# Patient Record
Sex: FEMALE | Race: WHITE | Employment: OTHER | URBAN - METROPOLITAN AREA
[De-identification: names, ages, dates, MRNs, and addresses within clinical notes are randomized per-mention and may not be internally consistent; named-entity substitution may affect disease eponyms.]

---

## 2017-02-20 ENCOUNTER — OFFICE VISIT (OUTPATIENT)
Dept: HEMATOLOGY/ONCOLOGY | Facility: HOSPITAL | Age: 57
End: 2017-02-20
Attending: INTERNAL MEDICINE
Payer: COMMERCIAL

## 2017-02-20 VITALS
DIASTOLIC BLOOD PRESSURE: 66 MMHG | HEIGHT: 65 IN | BODY MASS INDEX: 33.29 KG/M2 | SYSTOLIC BLOOD PRESSURE: 131 MMHG | RESPIRATION RATE: 20 BRPM | WEIGHT: 199.81 LBS | TEMPERATURE: 99 F | HEART RATE: 60 BPM

## 2017-02-20 DIAGNOSIS — K50.919 CROHN'S DISEASE WITH COMPLICATION, UNSPECIFIED GASTROINTESTINAL TRACT LOCATION (HCC): Primary | ICD-10-CM

## 2017-02-20 PROCEDURE — 96413 CHEMO IV INFUSION 1 HR: CPT

## 2017-02-20 PROCEDURE — 96415 CHEMO IV INFUSION ADDL HR: CPT

## 2017-02-20 RX ORDER — ACETAMINOPHEN 325 MG/1
650 TABLET ORAL ONCE
Status: COMPLETED | OUTPATIENT
Start: 2017-02-20 | End: 2017-02-20

## 2017-02-20 RX ORDER — DIPHENHYDRAMINE HCL 25 MG
25 CAPSULE ORAL ONCE
Status: COMPLETED | OUTPATIENT
Start: 2017-02-20 | End: 2017-02-20

## 2017-02-20 RX ADMIN — DIPHENHYDRAMINE HCL 25 MG: 25 MG CAPSULE ORAL at 13:25:00

## 2017-02-20 RX ADMIN — ACETAMINOPHEN 650 MG: 325 TABLET ORAL at 13:25:00

## 2017-02-20 NOTE — PROGRESS NOTES
Education Record    Learner:  Patient    Disease / Diagnosis:IBS    Barriers / Limitations:  None              Comments:    Method:  Brief focused              Comments:    General Topics:  Medication, Side effects and symptom management and Plan of care r

## 2017-04-17 ENCOUNTER — OFFICE VISIT (OUTPATIENT)
Dept: HEMATOLOGY/ONCOLOGY | Facility: HOSPITAL | Age: 57
End: 2017-04-17
Attending: INTERNAL MEDICINE
Payer: COMMERCIAL

## 2017-04-17 VITALS
HEART RATE: 68 BPM | DIASTOLIC BLOOD PRESSURE: 78 MMHG | BODY MASS INDEX: 32.79 KG/M2 | HEIGHT: 65 IN | RESPIRATION RATE: 18 BRPM | TEMPERATURE: 99 F | WEIGHT: 196.81 LBS | SYSTOLIC BLOOD PRESSURE: 130 MMHG

## 2017-04-17 DIAGNOSIS — K50.919 CROHN'S DISEASE WITH COMPLICATION, UNSPECIFIED GASTROINTESTINAL TRACT LOCATION (HCC): Primary | ICD-10-CM

## 2017-04-17 PROCEDURE — 96413 CHEMO IV INFUSION 1 HR: CPT

## 2017-04-17 PROCEDURE — 96415 CHEMO IV INFUSION ADDL HR: CPT

## 2017-04-17 RX ORDER — DIPHENHYDRAMINE HCL 25 MG
25 CAPSULE ORAL ONCE
Status: COMPLETED | OUTPATIENT
Start: 2017-04-17 | End: 2017-04-17

## 2017-04-17 RX ORDER — ACETAMINOPHEN 325 MG/1
650 TABLET ORAL ONCE
Status: COMPLETED | OUTPATIENT
Start: 2017-04-17 | End: 2017-04-17

## 2017-04-17 RX ADMIN — DIPHENHYDRAMINE HCL 25 MG: 25 MG CAPSULE ORAL at 13:15:00

## 2017-04-17 RX ADMIN — ACETAMINOPHEN 650 MG: 325 TABLET ORAL at 13:15:00

## 2017-04-17 NOTE — PROGRESS NOTES
Education Record    Learner:  Patient    Disease / Diagnosis: crohn's    Barriers / Limitations:  None   Comments:    Method:  Discussion   Comments:    General Topics:  Medication, Side effects and symptom management and Plan of care reviewed   Comments:

## 2017-06-12 ENCOUNTER — OFFICE VISIT (OUTPATIENT)
Dept: HEMATOLOGY/ONCOLOGY | Facility: HOSPITAL | Age: 57
End: 2017-06-12
Attending: INTERNAL MEDICINE
Payer: COMMERCIAL

## 2017-06-12 VITALS
TEMPERATURE: 98 F | RESPIRATION RATE: 18 BRPM | WEIGHT: 196.38 LBS | OXYGEN SATURATION: 97 % | DIASTOLIC BLOOD PRESSURE: 71 MMHG | SYSTOLIC BLOOD PRESSURE: 115 MMHG | HEIGHT: 65 IN | BODY MASS INDEX: 32.72 KG/M2 | HEART RATE: 69 BPM

## 2017-06-12 DIAGNOSIS — K50.919 CROHN'S DISEASE WITH COMPLICATION, UNSPECIFIED GASTROINTESTINAL TRACT LOCATION (HCC): Primary | ICD-10-CM

## 2017-06-12 PROCEDURE — 96415 CHEMO IV INFUSION ADDL HR: CPT

## 2017-06-12 PROCEDURE — 96413 CHEMO IV INFUSION 1 HR: CPT

## 2017-06-12 RX ORDER — DIPHENHYDRAMINE HCL 25 MG
25 CAPSULE ORAL ONCE
Status: COMPLETED | OUTPATIENT
Start: 2017-06-12 | End: 2017-06-12

## 2017-06-12 RX ORDER — ACETAMINOPHEN 325 MG/1
650 TABLET ORAL ONCE
Status: COMPLETED | OUTPATIENT
Start: 2017-06-12 | End: 2017-06-12

## 2017-06-12 RX ADMIN — ACETAMINOPHEN 650 MG: 325 TABLET ORAL at 13:00:00

## 2017-06-12 RX ADMIN — DIPHENHYDRAMINE HCL 25 MG: 25 MG CAPSULE ORAL at 13:00:00

## 2017-08-07 ENCOUNTER — OFFICE VISIT (OUTPATIENT)
Dept: HEMATOLOGY/ONCOLOGY | Facility: HOSPITAL | Age: 57
End: 2017-08-07
Attending: INTERNAL MEDICINE
Payer: COMMERCIAL

## 2017-08-07 VITALS
WEIGHT: 196 LBS | DIASTOLIC BLOOD PRESSURE: 77 MMHG | OXYGEN SATURATION: 97 % | RESPIRATION RATE: 18 BRPM | TEMPERATURE: 98 F | SYSTOLIC BLOOD PRESSURE: 125 MMHG | HEART RATE: 65 BPM | HEIGHT: 65 IN | BODY MASS INDEX: 32.65 KG/M2

## 2017-08-07 DIAGNOSIS — K50.919 CROHN'S DISEASE WITH COMPLICATION, UNSPECIFIED GASTROINTESTINAL TRACT LOCATION (HCC): Primary | ICD-10-CM

## 2017-08-07 PROCEDURE — 96415 CHEMO IV INFUSION ADDL HR: CPT

## 2017-08-07 PROCEDURE — 96413 CHEMO IV INFUSION 1 HR: CPT

## 2017-08-07 RX ORDER — ACETAMINOPHEN 325 MG/1
650 TABLET ORAL ONCE
Status: COMPLETED | OUTPATIENT
Start: 2017-08-07 | End: 2017-08-07

## 2017-08-07 RX ORDER — DIPHENHYDRAMINE HCL 25 MG
25 CAPSULE ORAL ONCE
Status: COMPLETED | OUTPATIENT
Start: 2017-08-07 | End: 2017-08-07

## 2017-08-07 RX ADMIN — ACETAMINOPHEN 650 MG: 325 TABLET ORAL at 13:28:00

## 2017-08-07 RX ADMIN — DIPHENHYDRAMINE HCL 25 MG: 25 MG CAPSULE ORAL at 13:28:00

## 2017-08-07 NOTE — PROGRESS NOTES
Education Record    Learner:  Patient    Disease / Diagnosis: Crohn's/Remicade     Barriers / Limitations:  None   Comments:    Method:  Discussion   Comments:    General Topics:  Medication, Side effects and symptom management and Plan of care reviewed

## 2017-10-02 ENCOUNTER — OFFICE VISIT (OUTPATIENT)
Dept: HEMATOLOGY/ONCOLOGY | Facility: HOSPITAL | Age: 57
End: 2017-10-02
Attending: INTERNAL MEDICINE
Payer: COMMERCIAL

## 2017-10-02 VITALS
BODY MASS INDEX: 32.96 KG/M2 | TEMPERATURE: 97 F | SYSTOLIC BLOOD PRESSURE: 112 MMHG | HEIGHT: 65 IN | DIASTOLIC BLOOD PRESSURE: 63 MMHG | WEIGHT: 197.81 LBS | HEART RATE: 57 BPM | RESPIRATION RATE: 16 BRPM

## 2017-10-02 DIAGNOSIS — K50.919 CROHN'S DISEASE WITH COMPLICATION, UNSPECIFIED GASTROINTESTINAL TRACT LOCATION (HCC): Primary | ICD-10-CM

## 2017-10-02 PROCEDURE — 96415 CHEMO IV INFUSION ADDL HR: CPT

## 2017-10-02 PROCEDURE — 96413 CHEMO IV INFUSION 1 HR: CPT

## 2017-10-02 RX ORDER — DIPHENHYDRAMINE HCL 25 MG
25 CAPSULE ORAL ONCE
Status: COMPLETED | OUTPATIENT
Start: 2017-10-02 | End: 2017-10-02

## 2017-10-02 RX ORDER — ACETAMINOPHEN 325 MG/1
650 TABLET ORAL ONCE
Status: COMPLETED | OUTPATIENT
Start: 2017-10-02 | End: 2017-10-02

## 2017-10-02 RX ADMIN — DIPHENHYDRAMINE HCL 25 MG: 25 MG CAPSULE ORAL at 13:20:00

## 2017-10-02 RX ADMIN — ACETAMINOPHEN 650 MG: 325 TABLET ORAL at 13:20:00

## 2017-10-02 NOTE — PROGRESS NOTES
Education Record    Learner:  Patient    Disease / Diagnosis: Crohn disease    Barriers / Limitations:  None   Comments:    Method:  Brief focused and Reinforcement   Comments:    General Topics:  Plan of care reviewed   Comments:    Outcome:  Shows unders

## 2017-11-06 ENCOUNTER — TELEPHONE (OUTPATIENT)
Dept: HEMATOLOGY/ONCOLOGY | Facility: HOSPITAL | Age: 57
End: 2017-11-06

## 2017-11-06 RX ORDER — ACETAMINOPHEN 325 MG/1
650 TABLET ORAL ONCE
Status: CANCELLED | OUTPATIENT
Start: 2017-11-06

## 2017-11-06 RX ORDER — DIPHENHYDRAMINE HCL 25 MG
25 CAPSULE ORAL ONCE
Status: CANCELLED | OUTPATIENT
Start: 2017-11-06

## 2017-11-06 NOTE — TELEPHONE ENCOUNTER
lvm for a return call to schedule pt for a remicade appt on or ofter 11/27/17.  The order has been scanned into the her chart

## 2017-11-06 NOTE — PROGRESS NOTES
New Remicade order received and entered into EPIC. Lani Favre made aware of authorization #. Okay to scan and schedule.

## 2017-11-27 ENCOUNTER — OFFICE VISIT (OUTPATIENT)
Dept: HEMATOLOGY/ONCOLOGY | Facility: HOSPITAL | Age: 57
End: 2017-11-27
Attending: INTERNAL MEDICINE
Payer: COMMERCIAL

## 2017-11-27 VITALS
HEIGHT: 65 IN | HEART RATE: 58 BPM | RESPIRATION RATE: 16 BRPM | SYSTOLIC BLOOD PRESSURE: 116 MMHG | WEIGHT: 202 LBS | TEMPERATURE: 98 F | DIASTOLIC BLOOD PRESSURE: 73 MMHG | BODY MASS INDEX: 33.66 KG/M2

## 2017-11-27 DIAGNOSIS — K50.919 CROHN'S DISEASE WITH COMPLICATION, UNSPECIFIED GASTROINTESTINAL TRACT LOCATION (HCC): Primary | ICD-10-CM

## 2017-11-27 PROCEDURE — 96415 CHEMO IV INFUSION ADDL HR: CPT

## 2017-11-27 PROCEDURE — 96413 CHEMO IV INFUSION 1 HR: CPT

## 2017-11-27 RX ORDER — ACETAMINOPHEN 325 MG/1
650 TABLET ORAL ONCE
Status: COMPLETED | OUTPATIENT
Start: 2017-11-27 | End: 2017-11-27

## 2017-11-27 RX ORDER — ACETAMINOPHEN 325 MG/1
650 TABLET ORAL ONCE
Status: CANCELLED | OUTPATIENT
Start: 2017-11-27

## 2017-11-27 RX ORDER — DIPHENHYDRAMINE HCL 25 MG
25 CAPSULE ORAL ONCE
Status: CANCELLED | OUTPATIENT
Start: 2017-11-27

## 2017-11-27 RX ORDER — DIPHENHYDRAMINE HCL 25 MG
25 CAPSULE ORAL ONCE
Status: COMPLETED | OUTPATIENT
Start: 2017-11-27 | End: 2017-11-27

## 2017-11-27 RX ADMIN — ACETAMINOPHEN 650 MG: 325 TABLET ORAL at 13:30:00

## 2017-11-27 RX ADMIN — DIPHENHYDRAMINE HCL 25 MG: 25 MG CAPSULE ORAL at 13:30:00

## 2017-11-27 NOTE — PATIENT INSTRUCTIONS
Education Record     Learner:  Patient     Disease / Diagnosis:Crohn's disease     Barriers / Limitations:  None              Comments:     Method:  Brief focused and Reinforcement              Comments:     General Topics:  Medication, Side effects and sy

## 2017-12-04 PROBLEM — K50.80 CROHN'S DISEASE OF BOTH SMALL AND LARGE INTESTINE WITHOUT COMPLICATION (HCC): Status: ACTIVE | Noted: 2017-12-04

## 2018-01-22 ENCOUNTER — OFFICE VISIT (OUTPATIENT)
Dept: HEMATOLOGY/ONCOLOGY | Facility: HOSPITAL | Age: 58
End: 2018-01-22
Attending: INTERNAL MEDICINE
Payer: COMMERCIAL

## 2018-01-22 VITALS
BODY MASS INDEX: 33.76 KG/M2 | OXYGEN SATURATION: 98 % | TEMPERATURE: 99 F | HEIGHT: 65 IN | SYSTOLIC BLOOD PRESSURE: 116 MMHG | WEIGHT: 202.63 LBS | RESPIRATION RATE: 18 BRPM | DIASTOLIC BLOOD PRESSURE: 78 MMHG | HEART RATE: 95 BPM

## 2018-01-22 DIAGNOSIS — K50.919 CROHN'S DISEASE WITH COMPLICATION, UNSPECIFIED GASTROINTESTINAL TRACT LOCATION (HCC): Primary | ICD-10-CM

## 2018-01-22 PROCEDURE — 96413 CHEMO IV INFUSION 1 HR: CPT

## 2018-01-22 PROCEDURE — 96415 CHEMO IV INFUSION ADDL HR: CPT

## 2018-01-22 RX ORDER — DIPHENHYDRAMINE HCL 25 MG
25 CAPSULE ORAL ONCE
Status: COMPLETED | OUTPATIENT
Start: 2018-01-22 | End: 2018-01-22

## 2018-01-22 RX ORDER — DIPHENHYDRAMINE HCL 25 MG
25 CAPSULE ORAL ONCE
Status: CANCELLED | OUTPATIENT
Start: 2018-01-22

## 2018-01-22 RX ORDER — ACETAMINOPHEN 325 MG/1
650 TABLET ORAL ONCE
Status: COMPLETED | OUTPATIENT
Start: 2018-01-22 | End: 2018-01-22

## 2018-01-22 RX ORDER — ACETAMINOPHEN 325 MG/1
650 TABLET ORAL ONCE
Status: CANCELLED | OUTPATIENT
Start: 2018-01-22

## 2018-01-22 RX ADMIN — ACETAMINOPHEN 650 MG: 325 TABLET ORAL at 13:09:00

## 2018-01-22 RX ADMIN — DIPHENHYDRAMINE HCL 25 MG: 25 MG CAPSULE ORAL at 13:09:00

## 2018-01-22 NOTE — PROGRESS NOTES
Education Record    Learner:  Patient    Disease / Diagnosis: Crohn's disease    Barriers / Limitations:  None   Comments:    Method:  Brief focused   Comments:    General Topics:  Plan of care reviewed   Comments:    Outcome:  Shows understanding   Commen

## 2018-03-19 ENCOUNTER — OFFICE VISIT (OUTPATIENT)
Dept: HEMATOLOGY/ONCOLOGY | Facility: HOSPITAL | Age: 58
End: 2018-03-19
Attending: INTERNAL MEDICINE
Payer: COMMERCIAL

## 2018-03-19 VITALS
HEART RATE: 58 BPM | OXYGEN SATURATION: 96 % | DIASTOLIC BLOOD PRESSURE: 78 MMHG | RESPIRATION RATE: 16 BRPM | SYSTOLIC BLOOD PRESSURE: 122 MMHG

## 2018-03-19 DIAGNOSIS — K50.919 CROHN'S DISEASE WITH COMPLICATION, UNSPECIFIED GASTROINTESTINAL TRACT LOCATION (HCC): Primary | ICD-10-CM

## 2018-03-19 PROCEDURE — 96413 CHEMO IV INFUSION 1 HR: CPT

## 2018-03-19 PROCEDURE — 96415 CHEMO IV INFUSION ADDL HR: CPT

## 2018-03-19 RX ORDER — ACETAMINOPHEN 325 MG/1
650 TABLET ORAL ONCE
Status: CANCELLED | OUTPATIENT
Start: 2018-03-19

## 2018-03-19 RX ORDER — DIPHENHYDRAMINE HCL 25 MG
25 CAPSULE ORAL ONCE
Status: COMPLETED | OUTPATIENT
Start: 2018-03-19 | End: 2018-03-19

## 2018-03-19 RX ORDER — DIPHENHYDRAMINE HCL 25 MG
25 CAPSULE ORAL ONCE
Status: CANCELLED | OUTPATIENT
Start: 2018-03-19

## 2018-03-19 RX ORDER — ACETAMINOPHEN 325 MG/1
650 TABLET ORAL ONCE
Status: COMPLETED | OUTPATIENT
Start: 2018-03-19 | End: 2018-03-19

## 2018-03-19 RX ADMIN — DIPHENHYDRAMINE HCL 25 MG: 25 MG CAPSULE ORAL at 13:45:00

## 2018-03-19 RX ADMIN — ACETAMINOPHEN 650 MG: 325 TABLET ORAL at 13:45:00

## 2018-03-19 NOTE — PROGRESS NOTES
Education Record    Learner:  Patient    Disease / Diagnosis:crohns disease    Barriers / Limitations:  None    Method:  Brief focused, printed material and  reinforcement    General Topics:  Plan of care reviewed    Outcome:  Shows understanding

## 2018-04-09 NOTE — PROGRESS NOTES
Education Record    Learner:  Patient    Disease / Diagnosis:Crohn's disease with complication, unspecified gastrointestinal tract location     Barriers / Limitations:  None   Comments:    Method:  Brief focused   Comments:    General Topics:  Infection, M
Bleeding that does not stop/Fever greater than 101

## 2018-05-08 ENCOUNTER — OFFICE VISIT (OUTPATIENT)
Dept: INTERNAL MEDICINE CLINIC | Facility: CLINIC | Age: 58
End: 2018-05-08

## 2018-05-08 VITALS
HEART RATE: 64 BPM | HEIGHT: 65 IN | BODY MASS INDEX: 33.6 KG/M2 | RESPIRATION RATE: 12 BRPM | DIASTOLIC BLOOD PRESSURE: 78 MMHG | SYSTOLIC BLOOD PRESSURE: 110 MMHG | TEMPERATURE: 98 F | WEIGHT: 201.69 LBS

## 2018-05-08 DIAGNOSIS — Z01.419 VISIT FOR PELVIC EXAM: ICD-10-CM

## 2018-05-08 DIAGNOSIS — R07.9 CHEST PAIN, UNSPECIFIED TYPE: ICD-10-CM

## 2018-05-08 DIAGNOSIS — Z00.00 PHYSICAL EXAM, ANNUAL: ICD-10-CM

## 2018-05-08 DIAGNOSIS — I83.93 VARICOSE VEINS OF BOTH LOWER EXTREMITIES: ICD-10-CM

## 2018-05-08 DIAGNOSIS — G89.29 CHRONIC NONINTRACTABLE HEADACHE, UNSPECIFIED HEADACHE TYPE: ICD-10-CM

## 2018-05-08 DIAGNOSIS — R51.9 CHRONIC NONINTRACTABLE HEADACHE, UNSPECIFIED HEADACHE TYPE: ICD-10-CM

## 2018-05-08 DIAGNOSIS — R94.31 ABNORMAL EKG: ICD-10-CM

## 2018-05-08 DIAGNOSIS — Z82.49 FAMILY HISTORY OF BRAIN ANEURYSM: ICD-10-CM

## 2018-05-08 DIAGNOSIS — Z12.31 ENCOUNTER FOR SCREENING MAMMOGRAM FOR BREAST CANCER: Primary | ICD-10-CM

## 2018-05-08 DIAGNOSIS — K50.919 CROHN'S DISEASE WITH COMPLICATION, UNSPECIFIED GASTROINTESTINAL TRACT LOCATION (HCC): ICD-10-CM

## 2018-05-08 PROCEDURE — 88175 CYTOPATH C/V AUTO FLUID REDO: CPT | Performed by: INTERNAL MEDICINE

## 2018-05-08 PROCEDURE — 93000 ELECTROCARDIOGRAM COMPLETE: CPT | Performed by: INTERNAL MEDICINE

## 2018-05-08 PROCEDURE — 99214 OFFICE O/P EST MOD 30 MIN: CPT | Performed by: INTERNAL MEDICINE

## 2018-05-08 PROCEDURE — 87624 HPV HI-RISK TYP POOLED RSLT: CPT | Performed by: INTERNAL MEDICINE

## 2018-05-08 PROCEDURE — 99396 PREV VISIT EST AGE 40-64: CPT | Performed by: INTERNAL MEDICINE

## 2018-05-08 NOTE — PROGRESS NOTES
[facility]    CHIEF COMPLAINT: Patient presents with:  Routine Physical: has gyne. [facility]. Swelling: swelling left foot and varicose veins.          HPI:   Royal Drake is a 62year old female who presents for a co oz    Body mass index is 33.56 kg/m².          Current Outpatient Prescriptions:  MESALAMINE 1.2 g Oral Tab EC TAKE 2 TABLETS TWICE A DAY Disp: 360 tablet Rfl: 1   AZATHIOPRINE 50 MG Oral Tab TAKE 1 TABLET DAILY Disp: 90 tablet Rfl: 1   Probiotic Product (V SYSTEMS:   GENERAL: feels well otherwise  SKIN: denies any unusual skin lesions  EYES:denies blurred vision or double vision  HEENT: denies nasal congestion, sinus pain or ST  LUNGS: denies shortness of breath with exertion  CARDIOVASCULAR: denies chest pa Results  Component Value Date/Time   WBC 8.6 10/16/2017 01:38 PM   HGB 12.5 10/16/2017 01:38 PM    10/16/2017 01:38 PM        Lab Results  Component Value Date/Time   GLU 91 10/16/2017 01:38 PM    (L) 10/16/2017 01:38 PM   K 4.5 10/16/2017 01: [facility] SCREENING BILAT (CPT=77067); Future    8. Abnormal EKG  - CARD ECHO STRESS ECHO/REST AND STRESS(CPT=93350/28268 DMG 66436); Future  - CARD ECHO 2D DOPPLER (CPT=93306); Future    The patient is asked to return in 1 year for cpx.     Varsha Moody MD

## 2018-05-14 ENCOUNTER — OFFICE VISIT (OUTPATIENT)
Dept: HEMATOLOGY/ONCOLOGY | Facility: HOSPITAL | Age: 58
End: 2018-05-14
Attending: INTERNAL MEDICINE
Payer: COMMERCIAL

## 2018-05-14 VITALS
BODY MASS INDEX: 34 KG/M2 | TEMPERATURE: 98 F | HEART RATE: 62 BPM | RESPIRATION RATE: 18 BRPM | DIASTOLIC BLOOD PRESSURE: 77 MMHG | SYSTOLIC BLOOD PRESSURE: 131 MMHG | WEIGHT: 202.81 LBS

## 2018-05-14 DIAGNOSIS — K50.919 CROHN'S DISEASE WITH COMPLICATION, UNSPECIFIED GASTROINTESTINAL TRACT LOCATION (HCC): Primary | ICD-10-CM

## 2018-05-14 PROCEDURE — 96413 CHEMO IV INFUSION 1 HR: CPT

## 2018-05-14 PROCEDURE — 96415 CHEMO IV INFUSION ADDL HR: CPT

## 2018-05-14 RX ORDER — ACETAMINOPHEN 325 MG/1
650 TABLET ORAL ONCE
Status: COMPLETED | OUTPATIENT
Start: 2018-05-14 | End: 2018-05-14

## 2018-05-14 RX ORDER — DIPHENHYDRAMINE HCL 25 MG
25 CAPSULE ORAL ONCE
Status: CANCELLED | OUTPATIENT
Start: 2018-05-14

## 2018-05-14 RX ORDER — ACETAMINOPHEN 325 MG/1
650 TABLET ORAL ONCE
Status: CANCELLED | OUTPATIENT
Start: 2018-05-14

## 2018-05-14 RX ORDER — DIPHENHYDRAMINE HCL 25 MG
25 CAPSULE ORAL ONCE
Status: COMPLETED | OUTPATIENT
Start: 2018-05-14 | End: 2018-05-14

## 2018-05-14 RX ADMIN — ACETAMINOPHEN 650 MG: 325 TABLET ORAL at 13:02:00

## 2018-05-14 RX ADMIN — DIPHENHYDRAMINE HCL 25 MG: 25 MG CAPSULE ORAL at 13:02:00

## 2018-05-14 NOTE — PROGRESS NOTES
Education Record    Learner:  Patient    Disease / Diagnosis: Remicade INF-Crohn's    Barriers / Limitations:  None   Comments:    Method:  Discussion   Comments:    General Topics:  Medication, Side effects and symptom management and Plan of care reviewed

## 2018-05-15 ENCOUNTER — LAB ENCOUNTER (OUTPATIENT)
Dept: LAB | Age: 58
End: 2018-05-15
Attending: INTERNAL MEDICINE
Payer: COMMERCIAL

## 2018-05-15 ENCOUNTER — APPOINTMENT (OUTPATIENT)
Dept: LAB | Age: 58
End: 2018-05-15
Attending: INTERNAL MEDICINE
Payer: COMMERCIAL

## 2018-05-15 DIAGNOSIS — Z00.00 PHYSICAL EXAM, ANNUAL: ICD-10-CM

## 2018-05-15 DIAGNOSIS — K50.80 CROHN'S DISEASE OF BOTH SMALL AND LARGE INTESTINE WITHOUT COMPLICATION (HCC): ICD-10-CM

## 2018-05-15 PROCEDURE — 85025 COMPLETE CBC W/AUTO DIFF WBC: CPT

## 2018-05-15 PROCEDURE — 84443 ASSAY THYROID STIM HORMONE: CPT

## 2018-05-15 PROCEDURE — 86140 C-REACTIVE PROTEIN: CPT

## 2018-05-15 PROCEDURE — 36415 COLL VENOUS BLD VENIPUNCTURE: CPT

## 2018-05-15 PROCEDURE — 86480 TB TEST CELL IMMUN MEASURE: CPT

## 2018-05-15 PROCEDURE — 80053 COMPREHEN METABOLIC PANEL: CPT

## 2018-05-15 PROCEDURE — 80061 LIPID PANEL: CPT

## 2018-05-24 ENCOUNTER — HOSPITAL ENCOUNTER (OUTPATIENT)
Dept: CV DIAGNOSTICS | Facility: HOSPITAL | Age: 58
Discharge: HOME | End: 2018-05-24
Attending: INTERNAL MEDICINE
Payer: COMMERCIAL

## 2018-05-24 DIAGNOSIS — R07.9 CHEST PAIN, UNSPECIFIED TYPE: ICD-10-CM

## 2018-05-24 DIAGNOSIS — R94.31 ABNORMAL EKG: ICD-10-CM

## 2018-05-24 PROCEDURE — 93306 TTE W/DOPPLER COMPLETE: CPT | Performed by: INTERNAL MEDICINE

## 2018-05-29 ENCOUNTER — HOSPITAL ENCOUNTER (OUTPATIENT)
Dept: CV DIAGNOSTICS | Facility: HOSPITAL | Age: 58
Discharge: HOME | End: 2018-05-29
Attending: INTERNAL MEDICINE
Payer: COMMERCIAL

## 2018-05-29 DIAGNOSIS — R07.9 CHEST PAIN, UNSPECIFIED TYPE: ICD-10-CM

## 2018-05-29 DIAGNOSIS — R94.31 ABNORMAL EKG: ICD-10-CM

## 2018-05-29 PROCEDURE — 93018 CV STRESS TEST I&R ONLY: CPT | Performed by: INTERNAL MEDICINE

## 2018-05-29 PROCEDURE — 93017 CV STRESS TEST TRACING ONLY: CPT | Performed by: INTERNAL MEDICINE

## 2018-05-29 PROCEDURE — 93350 STRESS TTE ONLY: CPT | Performed by: INTERNAL MEDICINE

## 2018-06-06 ENCOUNTER — HOSPITAL ENCOUNTER (OUTPATIENT)
Dept: MAMMOGRAPHY | Age: 58
Discharge: HOME | End: 2018-06-06
Attending: INTERNAL MEDICINE
Payer: COMMERCIAL

## 2018-06-06 ENCOUNTER — HOSPITAL ENCOUNTER (OUTPATIENT)
Dept: MRI IMAGING | Age: 58
Discharge: HOME | End: 2018-06-06
Attending: INTERNAL MEDICINE
Payer: COMMERCIAL

## 2018-06-06 DIAGNOSIS — Z82.49 FAMILY HISTORY OF BRAIN ANEURYSM: ICD-10-CM

## 2018-06-06 DIAGNOSIS — G89.29 CHRONIC NONINTRACTABLE HEADACHE, UNSPECIFIED HEADACHE TYPE: ICD-10-CM

## 2018-06-06 DIAGNOSIS — R51.9 CHRONIC NONINTRACTABLE HEADACHE, UNSPECIFIED HEADACHE TYPE: ICD-10-CM

## 2018-06-06 DIAGNOSIS — Z12.31 ENCOUNTER FOR SCREENING MAMMOGRAM FOR BREAST CANCER: ICD-10-CM

## 2018-06-06 PROCEDURE — 77067 SCR MAMMO BI INCL CAD: CPT | Performed by: INTERNAL MEDICINE

## 2018-06-06 PROCEDURE — 70544 MR ANGIOGRAPHY HEAD W/O DYE: CPT | Performed by: INTERNAL MEDICINE

## 2018-06-06 PROCEDURE — 77063 BREAST TOMOSYNTHESIS BI: CPT | Performed by: INTERNAL MEDICINE

## 2018-06-06 PROCEDURE — 70553 MRI BRAIN STEM W/O & W/DYE: CPT | Performed by: INTERNAL MEDICINE

## 2018-06-06 PROCEDURE — A9576 INJ PROHANCE MULTIPACK: HCPCS | Performed by: INTERNAL MEDICINE

## 2018-06-14 ENCOUNTER — PRIOR ORIGINAL RECORDS (OUTPATIENT)
Dept: OTHER | Age: 58
End: 2018-06-14

## 2018-07-09 ENCOUNTER — OFFICE VISIT (OUTPATIENT)
Dept: HEMATOLOGY/ONCOLOGY | Facility: HOSPITAL | Age: 58
End: 2018-07-09
Attending: INTERNAL MEDICINE
Payer: COMMERCIAL

## 2018-07-09 VITALS
WEIGHT: 201.5 LBS | BODY MASS INDEX: 34 KG/M2 | RESPIRATION RATE: 18 BRPM | HEART RATE: 64 BPM | TEMPERATURE: 98 F | SYSTOLIC BLOOD PRESSURE: 130 MMHG | DIASTOLIC BLOOD PRESSURE: 76 MMHG | OXYGEN SATURATION: 97 %

## 2018-07-09 DIAGNOSIS — K50.919 CROHN'S DISEASE WITH COMPLICATION, UNSPECIFIED GASTROINTESTINAL TRACT LOCATION (HCC): Primary | ICD-10-CM

## 2018-07-09 PROCEDURE — 96413 CHEMO IV INFUSION 1 HR: CPT

## 2018-07-09 PROCEDURE — 96415 CHEMO IV INFUSION ADDL HR: CPT

## 2018-07-09 RX ORDER — ACETAMINOPHEN 325 MG/1
650 TABLET ORAL ONCE
Status: COMPLETED | OUTPATIENT
Start: 2018-07-09 | End: 2018-07-09

## 2018-07-09 RX ORDER — DIPHENHYDRAMINE HCL 25 MG
25 CAPSULE ORAL ONCE
Status: CANCELLED | OUTPATIENT
Start: 2018-07-09

## 2018-07-09 RX ORDER — DIPHENHYDRAMINE HCL 25 MG
25 CAPSULE ORAL ONCE
Status: COMPLETED | OUTPATIENT
Start: 2018-07-09 | End: 2018-07-09

## 2018-07-09 RX ORDER — ACETAMINOPHEN 325 MG/1
650 TABLET ORAL ONCE
Status: CANCELLED | OUTPATIENT
Start: 2018-07-09

## 2018-07-09 RX ADMIN — ACETAMINOPHEN 650 MG: 325 TABLET ORAL at 13:13:00

## 2018-07-09 RX ADMIN — DIPHENHYDRAMINE HCL 25 MG: 25 MG CAPSULE ORAL at 13:13:00

## 2018-07-09 NOTE — PROGRESS NOTES
Education Record    Learner:  Patient    Disease / Diagnosis:Crohn's    Barriers / Limitations:none   Comments:    Method:  Discussion and Reinforcement   Comments:    General Topics:  Medication, Side effects and symptom management, Plan of care reviewed

## 2018-07-16 ENCOUNTER — PRIOR ORIGINAL RECORDS (OUTPATIENT)
Dept: OTHER | Age: 58
End: 2018-07-16

## 2018-07-18 ENCOUNTER — OFFICE VISIT (OUTPATIENT)
Dept: NEUROLOGY | Facility: CLINIC | Age: 58
End: 2018-07-18
Payer: COMMERCIAL

## 2018-07-18 ENCOUNTER — MYAURORA ACCOUNT LINK (OUTPATIENT)
Dept: OTHER | Age: 58
End: 2018-07-18

## 2018-07-18 ENCOUNTER — PRIOR ORIGINAL RECORDS (OUTPATIENT)
Dept: OTHER | Age: 58
End: 2018-07-18

## 2018-07-18 VITALS
HEIGHT: 66 IN | HEART RATE: 64 BPM | WEIGHT: 195 LBS | DIASTOLIC BLOOD PRESSURE: 78 MMHG | SYSTOLIC BLOOD PRESSURE: 120 MMHG | BODY MASS INDEX: 31.34 KG/M2

## 2018-07-18 DIAGNOSIS — G43.009 MIGRAINE WITHOUT AURA AND WITHOUT STATUS MIGRAINOSUS, NOT INTRACTABLE: ICD-10-CM

## 2018-07-18 DIAGNOSIS — G44.209 TENSION HEADACHE: Primary | ICD-10-CM

## 2018-07-18 PROCEDURE — 99204 OFFICE O/P NEW MOD 45 MIN: CPT | Performed by: OTHER

## 2018-07-18 NOTE — PROGRESS NOTES
Neurology H&P    Blanche Wolf Patient Status:  No patient class for patient encounter    11/15/1960 MRN [id number]   Location [address] [place] [address], [address] [place] [address], [address] [place] [address] Attending No att. providers found   Hosp Day # 0 PCP Victorino Kaba, many years ago. Current Medications:    Current Outpatient Prescriptions:  Probiotic Product (PROBIOTIC-10 OR) Take by mouth once.  Disp:  Rfl:    MESALAMINE 1.2 g Oral Tab EC TAKE 2 TABLETS TWICE A DAY Disp: 360 tablet Rfl: 1   AZATHIOPRINE 50 MG Oral prostate   • Prostate Cancer Father    • Cancer Mother 76     pancreatic   • Hypertension Mother    • Diabetes Mother    • stroke [OTHER] Paternal Grandmother    • Blood Disorder Other      blood clots, fam hx       ROS:  Gen: no unexplained weight loss  V walk: Normal       Heel walk: Normal    Coordination:       FTN: intact    Labs:       Imaging:  MRI Brain 6/6/18  FINDINGS:       The ventricles and sulci are within normal limits. There is no midline shift or mass effect.        The basal cisterns a has a normal course and caliber. The bilateral vertebral arteries are unremarkable. The origins of the bilateral PICA are seen.             =====  CONCLUSION:  Unremarkable MR angiogram head examination. Assessment:   This is a 61 y/o female with

## 2018-07-18 NOTE — PATIENT INSTRUCTIONS
Refill policies:    • Allow 2-3 business days for refills; controlled substances may take longer.   • Contact your pharmacy at least 5 days prior to running out of medication and have them send an electronic request or submit request through the “request re entire amount billed. Precertification and Prior Authorizations: If your physician has recommended that you have a procedure or additional testing performed.   [facility]) will contact your insurance carrier to obtain pre-certi

## 2018-09-04 ENCOUNTER — OFFICE VISIT (OUTPATIENT)
Dept: HEMATOLOGY/ONCOLOGY | Facility: HOSPITAL | Age: 58
End: 2018-09-04
Attending: INTERNAL MEDICINE
Payer: COMMERCIAL

## 2018-09-04 VITALS
RESPIRATION RATE: 16 BRPM | SYSTOLIC BLOOD PRESSURE: 100 MMHG | WEIGHT: 205 LBS | OXYGEN SATURATION: 96 % | DIASTOLIC BLOOD PRESSURE: 58 MMHG | HEART RATE: 64 BPM | TEMPERATURE: 99 F | BODY MASS INDEX: 33 KG/M2

## 2018-09-04 DIAGNOSIS — K50.919 CROHN'S DISEASE WITH COMPLICATION, UNSPECIFIED GASTROINTESTINAL TRACT LOCATION (HCC): Primary | ICD-10-CM

## 2018-09-04 PROCEDURE — 96415 CHEMO IV INFUSION ADDL HR: CPT

## 2018-09-04 PROCEDURE — 96413 CHEMO IV INFUSION 1 HR: CPT

## 2018-09-04 RX ORDER — DIPHENHYDRAMINE HCL 25 MG
25 CAPSULE ORAL ONCE
Status: COMPLETED | OUTPATIENT
Start: 2018-09-04 | End: 2018-09-04

## 2018-09-04 RX ORDER — ACETAMINOPHEN 325 MG/1
650 TABLET ORAL ONCE
Status: COMPLETED | OUTPATIENT
Start: 2018-09-04 | End: 2018-09-04

## 2018-09-04 RX ORDER — DIPHENHYDRAMINE HCL 25 MG
25 CAPSULE ORAL ONCE
Status: CANCELLED | OUTPATIENT
Start: 2018-09-04

## 2018-09-04 RX ORDER — DIPHENHYDRAMINE HCL 25 MG
25 CAPSULE ORAL ONCE
Status: CANCELLED | OUTPATIENT
Start: 2018-10-29

## 2018-09-04 RX ORDER — ACETAMINOPHEN 325 MG/1
650 TABLET ORAL ONCE
Status: CANCELLED | OUTPATIENT
Start: 2018-09-04

## 2018-09-04 RX ORDER — ACETAMINOPHEN 325 MG/1
650 TABLET ORAL ONCE
Status: CANCELLED | OUTPATIENT
Start: 2018-10-29

## 2018-09-04 RX ADMIN — DIPHENHYDRAMINE HCL 25 MG: 25 MG CAPSULE ORAL at 13:36:00

## 2018-09-04 RX ADMIN — ACETAMINOPHEN 650 MG: 325 TABLET ORAL at 13:36:00

## 2018-09-04 NOTE — PROGRESS NOTES
Education Record    Learner:  Patient    Disease / Diagnosis: Crohn's Disease    Barriers / Limitations:  None   Comments:    Method:  Brief focused and Reinforcement   Comments:    General Topics:  Plan of care reviewed   Comments:    Outcome:  Shows unde

## 2018-10-29 ENCOUNTER — OFFICE VISIT (OUTPATIENT)
Dept: HEMATOLOGY/ONCOLOGY | Facility: HOSPITAL | Age: 58
End: 2018-10-29
Attending: INTERNAL MEDICINE
Payer: COMMERCIAL

## 2018-10-29 VITALS
RESPIRATION RATE: 16 BRPM | BODY MASS INDEX: 33 KG/M2 | OXYGEN SATURATION: 98 % | TEMPERATURE: 98 F | DIASTOLIC BLOOD PRESSURE: 83 MMHG | SYSTOLIC BLOOD PRESSURE: 132 MMHG | HEART RATE: 73 BPM | WEIGHT: 205.38 LBS

## 2018-10-29 DIAGNOSIS — K50.80 CROHN'S DISEASE OF BOTH SMALL AND LARGE INTESTINE WITHOUT COMPLICATION (HCC): Primary | ICD-10-CM

## 2018-10-29 DIAGNOSIS — K50.919 CROHN'S DISEASE WITH COMPLICATION, UNSPECIFIED GASTROINTESTINAL TRACT LOCATION (HCC): ICD-10-CM

## 2018-10-29 PROCEDURE — 96413 CHEMO IV INFUSION 1 HR: CPT

## 2018-10-29 PROCEDURE — 96415 CHEMO IV INFUSION ADDL HR: CPT

## 2018-10-29 RX ORDER — DIPHENHYDRAMINE HCL 25 MG
25 CAPSULE ORAL ONCE
Status: COMPLETED | OUTPATIENT
Start: 2018-10-29 | End: 2018-10-29

## 2018-10-29 RX ORDER — ACETAMINOPHEN 325 MG/1
650 TABLET ORAL ONCE
Status: COMPLETED | OUTPATIENT
Start: 2018-10-29 | End: 2018-10-29

## 2018-10-29 RX ORDER — DIPHENHYDRAMINE HCL 25 MG
25 CAPSULE ORAL ONCE
Status: CANCELLED | OUTPATIENT
Start: 2018-12-23

## 2018-10-29 RX ORDER — ACETAMINOPHEN 325 MG/1
650 TABLET ORAL ONCE
Status: CANCELLED | OUTPATIENT
Start: 2018-12-23

## 2018-10-29 RX ADMIN — DIPHENHYDRAMINE HCL 25 MG: 25 MG CAPSULE ORAL at 13:16:00

## 2018-10-29 RX ADMIN — ACETAMINOPHEN 650 MG: 325 TABLET ORAL at 13:16:00

## 2018-10-29 NOTE — PROGRESS NOTES
Education Record    Learner:  Patient    Disease / Diagnosis:Crohn's disease    Barriers / Limitations:  None   Comments:    Method:  Discussion and Reinforcement   Comments:    General Topics:  Infection, Medication, Side effects and symptom management, P

## 2018-12-24 ENCOUNTER — OFFICE VISIT (OUTPATIENT)
Dept: HEMATOLOGY/ONCOLOGY | Facility: HOSPITAL | Age: 58
End: 2018-12-24
Attending: INTERNAL MEDICINE
Payer: COMMERCIAL

## 2018-12-24 VITALS
OXYGEN SATURATION: 100 % | HEIGHT: 65.98 IN | DIASTOLIC BLOOD PRESSURE: 81 MMHG | HEART RATE: 64 BPM | BODY MASS INDEX: 33.24 KG/M2 | TEMPERATURE: 96 F | RESPIRATION RATE: 20 BRPM | SYSTOLIC BLOOD PRESSURE: 134 MMHG | WEIGHT: 206.81 LBS

## 2018-12-24 DIAGNOSIS — K50.919 CROHN'S DISEASE WITH COMPLICATION, UNSPECIFIED GASTROINTESTINAL TRACT LOCATION (HCC): ICD-10-CM

## 2018-12-24 DIAGNOSIS — K50.80 CROHN'S DISEASE OF BOTH SMALL AND LARGE INTESTINE WITHOUT COMPLICATION (HCC): Primary | ICD-10-CM

## 2018-12-24 PROCEDURE — 96413 CHEMO IV INFUSION 1 HR: CPT

## 2018-12-24 PROCEDURE — 96415 CHEMO IV INFUSION ADDL HR: CPT

## 2018-12-24 RX ORDER — ACETAMINOPHEN 325 MG/1
650 TABLET ORAL ONCE
Status: COMPLETED | OUTPATIENT
Start: 2018-12-24 | End: 2018-12-24

## 2018-12-24 RX ORDER — ACETAMINOPHEN 325 MG/1
650 TABLET ORAL ONCE
Status: CANCELLED | OUTPATIENT
Start: 2019-02-11

## 2018-12-24 RX ORDER — DIPHENHYDRAMINE HCL 25 MG
25 CAPSULE ORAL ONCE
Status: COMPLETED | OUTPATIENT
Start: 2018-12-24 | End: 2018-12-24

## 2018-12-24 RX ORDER — DIPHENHYDRAMINE HCL 25 MG
25 CAPSULE ORAL ONCE
Status: CANCELLED | OUTPATIENT
Start: 2019-02-11

## 2018-12-24 RX ADMIN — DIPHENHYDRAMINE HCL 25 MG: 25 MG CAPSULE ORAL at 10:08:00

## 2018-12-24 RX ADMIN — ACETAMINOPHEN 650 MG: 325 TABLET ORAL at 10:08:00

## 2018-12-28 ENCOUNTER — PRIOR ORIGINAL RECORDS (OUTPATIENT)
Dept: OTHER | Age: 58
End: 2018-12-28

## 2019-01-10 ENCOUNTER — LAB ENCOUNTER (OUTPATIENT)
Dept: LAB | Age: 59
End: 2019-01-10
Attending: INTERNAL MEDICINE
Payer: COMMERCIAL

## 2019-01-10 DIAGNOSIS — K50.80 CROHN'S DISEASE OF BOTH SMALL AND LARGE INTESTINE WITHOUT COMPLICATION (HCC): ICD-10-CM

## 2019-01-10 LAB
ALBUMIN SERPL-MCNC: 3.5 G/DL (ref 3.1–4.5)
ALBUMIN/GLOB SERPL: 0.9 {RATIO} (ref 1–2)
ALP LIVER SERPL-CCNC: 52 U/L (ref 46–118)
ALT SERPL-CCNC: 27 U/L (ref 14–54)
ANION GAP SERPL CALC-SCNC: 6 MMOL/L (ref 0–18)
AST SERPL-CCNC: 22 U/L (ref 15–41)
BASOPHILS # BLD AUTO: 0.04 X10(3) UL (ref 0–0.1)
BASOPHILS NFR BLD AUTO: 0.5 %
BILIRUB SERPL-MCNC: 0.5 MG/DL (ref 0.1–2)
BUN BLD-MCNC: 20 MG/DL (ref 8–20)
BUN/CREAT SERPL: 26.3 (ref 10–20)
CALCIUM BLD-MCNC: 8.5 MG/DL (ref 8.3–10.3)
CHLORIDE SERPL-SCNC: 103 MMOL/L (ref 101–111)
CO2 SERPL-SCNC: 26 MMOL/L (ref 22–32)
CREAT BLD-MCNC: 0.76 MG/DL (ref 0.55–1.02)
CRP SERPL-MCNC: <0.29 MG/DL (ref ?–1)
EOSINOPHIL # BLD AUTO: 0.11 X10(3) UL (ref 0–0.3)
EOSINOPHIL NFR BLD AUTO: 1.4 %
ERYTHROCYTE [DISTWIDTH] IN BLOOD BY AUTOMATED COUNT: 13.4 % (ref 11.5–16)
FOLATE SERPL-MCNC: 33.3 NG/ML (ref 5.9–?)
GLOBULIN PLAS-MCNC: 4 G/DL (ref 2.8–4.4)
GLUCOSE BLD-MCNC: 99 MG/DL (ref 70–99)
HAV AB SERPL IA-ACNC: 594 PG/ML (ref 193–986)
HCT VFR BLD AUTO: 38 % (ref 34–50)
HGB BLD-MCNC: 12.3 G/DL (ref 12–16)
IMMATURE GRANULOCYTE COUNT: 0.02 X10(3) UL (ref 0–1)
IMMATURE GRANULOCYTE RATIO %: 0.3 %
LYMPHOCYTES # BLD AUTO: 2.19 X10(3) UL (ref 0.9–4)
LYMPHOCYTES NFR BLD AUTO: 28.8 %
M PROTEIN MFR SERPL ELPH: 7.5 G/DL (ref 6.4–8.2)
MCH RBC QN AUTO: 30.4 PG (ref 27–33.2)
MCHC RBC AUTO-ENTMCNC: 32.4 G/DL (ref 31–37)
MCV RBC AUTO: 94.1 FL (ref 81–100)
MONOCYTES # BLD AUTO: 0.83 X10(3) UL (ref 0.1–1)
MONOCYTES NFR BLD AUTO: 10.9 %
NEUTROPHIL ABS PRELIM: 4.41 X10 (3) UL (ref 1.3–6.7)
NEUTROPHILS # BLD AUTO: 4.41 X10(3) UL (ref 1.3–6.7)
NEUTROPHILS NFR BLD AUTO: 58.1 %
OSMOLALITY SERPL CALC.SUM OF ELEC: 283 MOSM/KG (ref 275–295)
PLATELET # BLD AUTO: 264 10(3)UL (ref 150–450)
POTASSIUM SERPL-SCNC: 4.2 MMOL/L (ref 3.6–5.1)
RBC # BLD AUTO: 4.04 X10(6)UL (ref 3.8–5.1)
RED CELL DISTRIBUTION WIDTH-SD: 46.6 FL (ref 35.1–46.3)
SODIUM SERPL-SCNC: 135 MMOL/L (ref 136–144)
WBC # BLD AUTO: 7.6 X10(3) UL (ref 4–13)

## 2019-01-10 PROCEDURE — 82607 VITAMIN B-12: CPT

## 2019-01-10 PROCEDURE — 80053 COMPREHEN METABOLIC PANEL: CPT

## 2019-01-10 PROCEDURE — 36415 COLL VENOUS BLD VENIPUNCTURE: CPT

## 2019-01-10 PROCEDURE — 85025 COMPLETE CBC W/AUTO DIFF WBC: CPT

## 2019-01-10 PROCEDURE — 82746 ASSAY OF FOLIC ACID SERUM: CPT

## 2019-01-10 PROCEDURE — 86140 C-REACTIVE PROTEIN: CPT

## 2019-02-18 ENCOUNTER — OFFICE VISIT (OUTPATIENT)
Dept: HEMATOLOGY/ONCOLOGY | Facility: HOSPITAL | Age: 59
End: 2019-02-18
Attending: INTERNAL MEDICINE
Payer: COMMERCIAL

## 2019-02-18 VITALS
BODY MASS INDEX: 33 KG/M2 | DIASTOLIC BLOOD PRESSURE: 53 MMHG | HEART RATE: 61 BPM | WEIGHT: 206.38 LBS | TEMPERATURE: 98 F | OXYGEN SATURATION: 98 % | RESPIRATION RATE: 18 BRPM | SYSTOLIC BLOOD PRESSURE: 119 MMHG

## 2019-02-18 DIAGNOSIS — K50.80 CROHN'S DISEASE OF BOTH SMALL AND LARGE INTESTINE WITHOUT COMPLICATION (HCC): Primary | ICD-10-CM

## 2019-02-18 PROCEDURE — 96413 CHEMO IV INFUSION 1 HR: CPT

## 2019-02-18 PROCEDURE — 96415 CHEMO IV INFUSION ADDL HR: CPT

## 2019-02-18 RX ORDER — DIPHENHYDRAMINE HCL 25 MG
25 CAPSULE ORAL ONCE
Status: COMPLETED | OUTPATIENT
Start: 2019-02-18 | End: 2019-02-18

## 2019-02-18 RX ORDER — ACETAMINOPHEN 325 MG/1
650 TABLET ORAL ONCE
Status: CANCELLED | OUTPATIENT
Start: 2019-02-18

## 2019-02-18 RX ORDER — DIPHENHYDRAMINE HCL 25 MG
25 CAPSULE ORAL ONCE
Status: CANCELLED | OUTPATIENT
Start: 2019-02-18

## 2019-02-18 RX ORDER — ACETAMINOPHEN 325 MG/1
650 TABLET ORAL ONCE
Status: COMPLETED | OUTPATIENT
Start: 2019-02-18 | End: 2019-02-18

## 2019-02-18 RX ADMIN — ACETAMINOPHEN 650 MG: 325 TABLET ORAL at 13:07:00

## 2019-02-18 RX ADMIN — DIPHENHYDRAMINE HCL 25 MG: 25 MG CAPSULE ORAL at 13:07:00

## 2019-02-18 NOTE — PROGRESS NOTES
Education Record    Learner:  Patient    Disease / Diagnosis: Crohn's disease - IV Remicade infusion    Barriers / Limitations:  None   Comments:    Method:  Brief focused and Reinforcement   Comments:    General Topics:  Plan of care reviewed   Comments:

## 2019-02-20 RX ORDER — AZATHIOPRINE 50 MG/1
TABLET ORAL
Qty: 90 TABLET | Refills: 2 | Status: SHIPPED | OUTPATIENT
Start: 2019-02-20 | End: 2019-02-25

## 2019-02-20 NOTE — TELEPHONE ENCOUNTER
Per last OV from 6/4/18: IMPRESSION:      1. 63 y/o female with a h/o Crohns ileocolitis dx 2006, presenting for follow up. Symptomatically doing well on Remicade 5mg/kg, Imuran 50mg and Lialda 4 tabs daily.  Endoscopically has short segment ulceration o

## 2019-02-26 RX ORDER — AZATHIOPRINE 50 MG/1
50 TABLET ORAL
Qty: 90 TABLET | Refills: 2 | Status: SHIPPED | OUTPATIENT
Start: 2019-02-26 | End: 2020-05-10

## 2019-02-26 NOTE — TELEPHONE ENCOUNTER
Per last OV from 6/4/18: IMPRESSION:      1. 61 y/o female with a h/o Crohns ileocolitis dx 2006, presenting for follow up. Symptomatically doing well on Remicade 5mg/kg, Imuran 50mg and Lialda 4 tabs daily.  Endoscopically has short segment ulceration o

## 2019-02-28 VITALS
SYSTOLIC BLOOD PRESSURE: 130 MMHG | WEIGHT: 195 LBS | RESPIRATION RATE: 16 BRPM | HEIGHT: 66 IN | HEART RATE: 68 BPM | BODY MASS INDEX: 31.34 KG/M2 | DIASTOLIC BLOOD PRESSURE: 68 MMHG

## 2019-02-28 VITALS
WEIGHT: 195 LBS | HEART RATE: 60 BPM | SYSTOLIC BLOOD PRESSURE: 112 MMHG | RESPIRATION RATE: 16 BRPM | HEIGHT: 66 IN | DIASTOLIC BLOOD PRESSURE: 60 MMHG | BODY MASS INDEX: 31.34 KG/M2

## 2019-04-15 ENCOUNTER — OFFICE VISIT (OUTPATIENT)
Dept: HEMATOLOGY/ONCOLOGY | Facility: HOSPITAL | Age: 59
End: 2019-04-15
Attending: INTERNAL MEDICINE
Payer: COMMERCIAL

## 2019-04-15 VITALS
SYSTOLIC BLOOD PRESSURE: 117 MMHG | HEART RATE: 66 BPM | HEIGHT: 65.98 IN | WEIGHT: 208.38 LBS | OXYGEN SATURATION: 100 % | DIASTOLIC BLOOD PRESSURE: 58 MMHG | TEMPERATURE: 98 F | BODY MASS INDEX: 33.49 KG/M2 | RESPIRATION RATE: 16 BRPM

## 2019-04-15 DIAGNOSIS — K50.80 CROHN'S DISEASE OF BOTH SMALL AND LARGE INTESTINE WITHOUT COMPLICATION (HCC): Primary | ICD-10-CM

## 2019-04-15 PROCEDURE — 96413 CHEMO IV INFUSION 1 HR: CPT

## 2019-04-15 PROCEDURE — 96415 CHEMO IV INFUSION ADDL HR: CPT

## 2019-04-15 RX ORDER — DIPHENHYDRAMINE HCL 25 MG
25 CAPSULE ORAL ONCE
Status: COMPLETED | OUTPATIENT
Start: 2019-04-15 | End: 2019-04-15

## 2019-04-15 RX ORDER — ACETAMINOPHEN 325 MG/1
650 TABLET ORAL ONCE
Status: CANCELLED | OUTPATIENT
Start: 2019-04-15

## 2019-04-15 RX ORDER — DIPHENHYDRAMINE HCL 25 MG
25 CAPSULE ORAL ONCE
Status: CANCELLED | OUTPATIENT
Start: 2019-04-15

## 2019-04-15 RX ORDER — ACETAMINOPHEN 325 MG/1
650 TABLET ORAL ONCE
Status: COMPLETED | OUTPATIENT
Start: 2019-04-15 | End: 2019-04-15

## 2019-04-15 RX ADMIN — DIPHENHYDRAMINE HCL 25 MG: 25 MG CAPSULE ORAL at 13:31:00

## 2019-04-15 RX ADMIN — ACETAMINOPHEN 650 MG: 325 TABLET ORAL at 13:31:00

## 2019-06-10 ENCOUNTER — OFFICE VISIT (OUTPATIENT)
Dept: HEMATOLOGY/ONCOLOGY | Facility: HOSPITAL | Age: 59
End: 2019-06-10
Attending: INTERNAL MEDICINE
Payer: COMMERCIAL

## 2019-06-10 VITALS
TEMPERATURE: 99 F | RESPIRATION RATE: 18 BRPM | BODY MASS INDEX: 33 KG/M2 | WEIGHT: 205.19 LBS | SYSTOLIC BLOOD PRESSURE: 117 MMHG | DIASTOLIC BLOOD PRESSURE: 71 MMHG | HEART RATE: 68 BPM

## 2019-06-10 DIAGNOSIS — K50.80 CROHN'S DISEASE OF BOTH SMALL AND LARGE INTESTINE WITHOUT COMPLICATION (HCC): Primary | ICD-10-CM

## 2019-06-10 PROCEDURE — 96413 CHEMO IV INFUSION 1 HR: CPT

## 2019-06-10 PROCEDURE — 96415 CHEMO IV INFUSION ADDL HR: CPT

## 2019-06-10 RX ORDER — DIPHENHYDRAMINE HCL 25 MG
25 CAPSULE ORAL ONCE
Status: COMPLETED | OUTPATIENT
Start: 2019-06-10 | End: 2019-06-10

## 2019-06-10 RX ORDER — DIPHENHYDRAMINE HCL 25 MG
25 CAPSULE ORAL ONCE
Status: CANCELLED | OUTPATIENT
Start: 2019-08-05

## 2019-06-10 RX ORDER — ACETAMINOPHEN 325 MG/1
650 TABLET ORAL ONCE
Status: CANCELLED | OUTPATIENT
Start: 2019-08-05

## 2019-06-10 RX ORDER — ACETAMINOPHEN 325 MG/1
650 TABLET ORAL ONCE
Status: COMPLETED | OUTPATIENT
Start: 2019-06-10 | End: 2019-06-10

## 2019-06-10 RX ADMIN — DIPHENHYDRAMINE HCL 25 MG: 25 MG CAPSULE ORAL at 11:19:00

## 2019-06-10 RX ADMIN — ACETAMINOPHEN 650 MG: 325 TABLET ORAL at 11:19:00

## 2019-08-05 ENCOUNTER — OFFICE VISIT (OUTPATIENT)
Dept: HEMATOLOGY/ONCOLOGY | Facility: HOSPITAL | Age: 59
End: 2019-08-05
Attending: INTERNAL MEDICINE
Payer: COMMERCIAL

## 2019-08-05 VITALS
OXYGEN SATURATION: 96 % | HEIGHT: 65.98 IN | DIASTOLIC BLOOD PRESSURE: 53 MMHG | WEIGHT: 204 LBS | HEART RATE: 54 BPM | TEMPERATURE: 98 F | RESPIRATION RATE: 18 BRPM | BODY MASS INDEX: 32.78 KG/M2 | SYSTOLIC BLOOD PRESSURE: 103 MMHG

## 2019-08-05 DIAGNOSIS — K50.80 CROHN'S DISEASE OF BOTH SMALL AND LARGE INTESTINE WITHOUT COMPLICATION (HCC): Primary | ICD-10-CM

## 2019-08-05 PROCEDURE — 36415 COLL VENOUS BLD VENIPUNCTURE: CPT

## 2019-08-05 PROCEDURE — 96413 CHEMO IV INFUSION 1 HR: CPT

## 2019-08-05 PROCEDURE — 86480 TB TEST CELL IMMUN MEASURE: CPT

## 2019-08-05 PROCEDURE — 96415 CHEMO IV INFUSION ADDL HR: CPT

## 2019-08-05 RX ORDER — DIPHENHYDRAMINE HCL 25 MG
25 CAPSULE ORAL ONCE
Status: CANCELLED | OUTPATIENT
Start: 2019-09-30

## 2019-08-05 RX ORDER — DIPHENHYDRAMINE HCL 25 MG
25 CAPSULE ORAL ONCE
Status: COMPLETED | OUTPATIENT
Start: 2019-08-05 | End: 2019-08-05

## 2019-08-05 RX ORDER — ACETAMINOPHEN 325 MG/1
650 TABLET ORAL ONCE
Status: CANCELLED | OUTPATIENT
Start: 2019-09-30

## 2019-08-05 RX ORDER — ACETAMINOPHEN 325 MG/1
650 TABLET ORAL ONCE
Status: COMPLETED | OUTPATIENT
Start: 2019-08-05 | End: 2019-08-05

## 2019-08-05 RX ADMIN — DIPHENHYDRAMINE HCL 25 MG: 25 MG CAPSULE ORAL at 13:37:00

## 2019-08-05 RX ADMIN — ACETAMINOPHEN 650 MG: 325 TABLET ORAL at 13:37:00

## 2019-08-05 NOTE — PROGRESS NOTES
Education Record    Learner:  Patient    Disease / Diagnosis: Crohn's - Remicade every 8 weeks. Due for TB labs, TB drawn per protocol.      Barriers / Limitations:  None   Comments:    Method:  Discussion   Comments:    General Topics:  Medication and Plan

## 2019-08-07 LAB
M TB TUBERC IFN-G BLD QL: NEGATIVE
M TB TUBERC IFN-G/MITOGEN IGNF BLD: 0.01 IU/ML
M TB TUBERC IFN-G/MITOGEN IGNF BLD: 0.22 IU/ML
M TB TUBERC IGNF/MITOGEN IGNF CONTROL: >10 IU/ML
MITOGEN IGNF BCKGRD COR BLD-ACNC: 0.05 IU/ML

## 2019-08-13 ENCOUNTER — HOSPITAL ENCOUNTER (OUTPATIENT)
Age: 59
Discharge: HOME | End: 2019-08-13
Attending: FAMILY MEDICINE
Payer: COMMERCIAL

## 2019-08-13 VITALS
OXYGEN SATURATION: 98 % | BODY MASS INDEX: 31.34 KG/M2 | DIASTOLIC BLOOD PRESSURE: 73 MMHG | RESPIRATION RATE: 16 BRPM | TEMPERATURE: 98 F | SYSTOLIC BLOOD PRESSURE: 128 MMHG | HEART RATE: 64 BPM | WEIGHT: 195 LBS | HEIGHT: 66 IN

## 2019-08-13 DIAGNOSIS — L03.012 PARONYCHIA OF LEFT INDEX FINGER: Primary | ICD-10-CM

## 2019-08-13 PROCEDURE — 87205 SMEAR GRAM STAIN: CPT | Performed by: FAMILY MEDICINE

## 2019-08-13 PROCEDURE — 87070 CULTURE OTHR SPECIMN AEROBIC: CPT | Performed by: FAMILY MEDICINE

## 2019-08-13 PROCEDURE — 87186 SC STD MICRODIL/AGAR DIL: CPT | Performed by: FAMILY MEDICINE

## 2019-08-13 PROCEDURE — 99203 OFFICE O/P NEW LOW 30 MIN: CPT

## 2019-08-13 PROCEDURE — 10060 I&D ABSCESS SIMPLE/SINGLE: CPT

## 2019-08-13 PROCEDURE — 99214 OFFICE O/P EST MOD 30 MIN: CPT

## 2019-08-13 PROCEDURE — 87147 CULTURE TYPE IMMUNOLOGIC: CPT | Performed by: FAMILY MEDICINE

## 2019-08-13 RX ORDER — DOXYCYCLINE HYCLATE 100 MG/1
100 CAPSULE ORAL 2 TIMES DAILY
Qty: 20 CAPSULE | Refills: 0 | Status: SHIPPED | OUTPATIENT
Start: 2019-08-13 | End: 2019-08-23

## 2019-08-13 NOTE — ED INITIAL ASSESSMENT (HPI)
C/o left index finger pain, swelling for past couple days. Thinks she had a hang nail that got infected. Pt. Is right-hand dominant.

## 2019-08-14 NOTE — ED PROVIDER NOTES
Patient Seen in: [facility]    History   Patient presents with:  Cellulitis (integumentary, infectious)    Stated Complaint: LEFT INDEX PAIN X 4 DAYS    HPI    62year old female presents for left index finger pain.  States 1835]   /73   Pulse 64   Resp 16   Temp 98.4 °F (36.9 °C)   Temp src Oral   SpO2 98 %   O2 Device None (Room air)       Current:/73   Pulse 64   Temp 98.4 °F (36.9 °C) (Oral)   Resp 16   Ht 167.6 cm (5' 6\")   Wt 88.5 kg   SpO2 98%   BMI 31.47

## 2019-09-30 ENCOUNTER — OFFICE VISIT (OUTPATIENT)
Dept: HEMATOLOGY/ONCOLOGY | Facility: HOSPITAL | Age: 59
End: 2019-09-30
Attending: INTERNAL MEDICINE
Payer: COMMERCIAL

## 2019-09-30 VITALS
TEMPERATURE: 98 F | DIASTOLIC BLOOD PRESSURE: 77 MMHG | SYSTOLIC BLOOD PRESSURE: 114 MMHG | RESPIRATION RATE: 18 BRPM | HEART RATE: 78 BPM | WEIGHT: 203 LBS | BODY MASS INDEX: 33 KG/M2 | OXYGEN SATURATION: 98 %

## 2019-09-30 DIAGNOSIS — K50.80 CROHN'S DISEASE OF BOTH SMALL AND LARGE INTESTINE WITHOUT COMPLICATION (HCC): Primary | ICD-10-CM

## 2019-09-30 PROCEDURE — 96413 CHEMO IV INFUSION 1 HR: CPT

## 2019-09-30 PROCEDURE — 96415 CHEMO IV INFUSION ADDL HR: CPT

## 2019-09-30 RX ORDER — DIPHENHYDRAMINE HCL 25 MG
25 CAPSULE ORAL ONCE
Status: COMPLETED | OUTPATIENT
Start: 2019-09-30 | End: 2019-09-30

## 2019-09-30 RX ORDER — ACETAMINOPHEN 325 MG/1
650 TABLET ORAL ONCE
Status: CANCELLED | OUTPATIENT
Start: 2019-11-25

## 2019-09-30 RX ORDER — DIPHENHYDRAMINE HCL 25 MG
25 CAPSULE ORAL ONCE
Status: CANCELLED | OUTPATIENT
Start: 2019-11-25

## 2019-09-30 RX ORDER — ACETAMINOPHEN 325 MG/1
650 TABLET ORAL ONCE
Status: COMPLETED | OUTPATIENT
Start: 2019-09-30 | End: 2019-09-30

## 2019-09-30 RX ADMIN — DIPHENHYDRAMINE HCL 25 MG: 25 MG CAPSULE ORAL at 13:27:00

## 2019-09-30 RX ADMIN — ACETAMINOPHEN 650 MG: 325 TABLET ORAL at 13:27:00

## 2019-11-25 ENCOUNTER — OFFICE VISIT (OUTPATIENT)
Dept: HEMATOLOGY/ONCOLOGY | Facility: HOSPITAL | Age: 59
End: 2019-11-25
Attending: INTERNAL MEDICINE
Payer: COMMERCIAL

## 2019-11-25 VITALS
SYSTOLIC BLOOD PRESSURE: 116 MMHG | OXYGEN SATURATION: 98 % | DIASTOLIC BLOOD PRESSURE: 70 MMHG | RESPIRATION RATE: 18 BRPM | TEMPERATURE: 99 F | HEIGHT: 65.98 IN | WEIGHT: 203 LBS | BODY MASS INDEX: 32.62 KG/M2 | HEART RATE: 57 BPM

## 2019-11-25 DIAGNOSIS — K50.80 CROHN'S DISEASE OF BOTH SMALL AND LARGE INTESTINE WITHOUT COMPLICATION (HCC): Primary | ICD-10-CM

## 2019-11-25 PROCEDURE — 96415 CHEMO IV INFUSION ADDL HR: CPT

## 2019-11-25 PROCEDURE — 96413 CHEMO IV INFUSION 1 HR: CPT

## 2019-11-25 RX ORDER — DIPHENHYDRAMINE HCL 25 MG
25 CAPSULE ORAL ONCE
Status: CANCELLED | OUTPATIENT
Start: 2019-11-25

## 2019-11-25 RX ORDER — ACETAMINOPHEN 325 MG/1
650 TABLET ORAL ONCE
Status: COMPLETED | OUTPATIENT
Start: 2019-11-25 | End: 2019-11-25

## 2019-11-25 RX ORDER — DIPHENHYDRAMINE HCL 25 MG
25 CAPSULE ORAL ONCE
Status: COMPLETED | OUTPATIENT
Start: 2019-11-25 | End: 2019-11-25

## 2019-11-25 RX ORDER — ACETAMINOPHEN 325 MG/1
650 TABLET ORAL ONCE
Status: CANCELLED | OUTPATIENT
Start: 2019-11-25

## 2019-11-25 RX ADMIN — ACETAMINOPHEN 650 MG: 325 TABLET ORAL at 13:23:00

## 2019-11-25 RX ADMIN — DIPHENHYDRAMINE HCL 25 MG: 25 MG CAPSULE ORAL at 13:23:00

## 2020-01-05 ENCOUNTER — HOSPITAL ENCOUNTER (OUTPATIENT)
Age: 60
Discharge: HOME | End: 2020-01-05
Attending: FAMILY MEDICINE
Payer: COMMERCIAL

## 2020-01-05 VITALS
SYSTOLIC BLOOD PRESSURE: 160 MMHG | HEART RATE: 66 BPM | TEMPERATURE: 98 F | OXYGEN SATURATION: 97 % | DIASTOLIC BLOOD PRESSURE: 85 MMHG | RESPIRATION RATE: 20 BRPM

## 2020-01-05 DIAGNOSIS — K11.20 SIALOADENITIS: Primary | ICD-10-CM

## 2020-01-05 PROCEDURE — 99213 OFFICE O/P EST LOW 20 MIN: CPT

## 2020-01-05 PROCEDURE — 99214 OFFICE O/P EST MOD 30 MIN: CPT

## 2020-01-05 RX ORDER — DOXYCYCLINE HYCLATE 100 MG/1
100 CAPSULE ORAL 2 TIMES DAILY
Qty: 14 CAPSULE | Refills: 0 | Status: SHIPPED | OUTPATIENT
Start: 2020-01-05 | End: 2020-01-12

## 2020-01-05 NOTE — ED PROVIDER NOTES
Patient Seen in: [facility]      History   Patient presents with:  Jaw Pain  Swelling    Stated Complaint: SWOLLEN NECK/PAIN    HPI    60-year-old female history of Crohn's disease– azathioprine and anxiety presents the MONTY watts are as noted in HPI. Constitutional and vital signs reviewed. All other systems reviewed and negative except as noted above.     Physical Exam     ED Triage Vitals [01/05/20 1540]   /85   Pulse 66   Resp 20   Temp 97.6 °F (36.4 °C)   Temp src Or Hyclate 100 MG Oral Cap  Take 1 capsule (100 mg total) by mouth 2 (two) times daily for 7 days. , Normal, Disp-14 capsule, R-0

## 2020-01-05 NOTE — ED INITIAL ASSESSMENT (HPI)
Pt c/o pain in right upper and lower teeth, jaw and into right ear and under jaw for 2 days.   States teeth and gums appear normal.

## 2020-01-14 ENCOUNTER — LAB ENCOUNTER (OUTPATIENT)
Dept: LAB | Age: 60
End: 2020-01-14
Attending: NURSE PRACTITIONER
Payer: COMMERCIAL

## 2020-01-14 DIAGNOSIS — K50.00 CROHN'S DISEASE OF SMALL INTESTINE WITHOUT COMPLICATION (HCC): ICD-10-CM

## 2020-01-14 LAB
ALBUMIN SERPL-MCNC: 3.5 G/DL (ref 3.4–5)
ALBUMIN/GLOB SERPL: 0.8 {RATIO} (ref 1–2)
ALP LIVER SERPL-CCNC: 49 U/L (ref 46–118)
ALT SERPL-CCNC: 25 U/L (ref 13–56)
ANION GAP SERPL CALC-SCNC: 6 MMOL/L (ref 0–18)
AST SERPL-CCNC: 17 U/L (ref 15–37)
BASOPHILS # BLD AUTO: 0.05 X10(3) UL (ref 0–0.2)
BASOPHILS NFR BLD AUTO: 0.9 %
BILIRUB SERPL-MCNC: 0.6 MG/DL (ref 0.1–2)
BUN BLD-MCNC: 10 MG/DL (ref 7–18)
BUN/CREAT SERPL: 13.7 (ref 10–20)
CALCIUM BLD-MCNC: 8.5 MG/DL (ref 8.5–10.1)
CHLORIDE SERPL-SCNC: 104 MMOL/L (ref 98–112)
CO2 SERPL-SCNC: 28 MMOL/L (ref 21–32)
CREAT BLD-MCNC: 0.73 MG/DL (ref 0.55–1.02)
CRP SERPL-MCNC: <0.29 MG/DL (ref ?–0.3)
DEPRECATED RDW RBC AUTO: 46.5 FL (ref 35.1–46.3)
EOSINOPHIL # BLD AUTO: 0.13 X10(3) UL (ref 0–0.7)
EOSINOPHIL NFR BLD AUTO: 2.3 %
ERYTHROCYTE [DISTWIDTH] IN BLOOD BY AUTOMATED COUNT: 13.1 % (ref 11–15)
FOLATE SERPL-MCNC: 28.2 NG/ML (ref 8.7–?)
GLOBULIN PLAS-MCNC: 4.6 G/DL (ref 2.8–4.4)
GLUCOSE BLD-MCNC: 92 MG/DL (ref 70–99)
HBV SURFACE AB SER QL: NONREACTIVE
HBV SURFACE AB SERPL IA-ACNC: <3.1 MIU/ML
HCT VFR BLD AUTO: 39.2 % (ref 35–48)
HGB BLD-MCNC: 12.9 G/DL (ref 12–16)
IMM GRANULOCYTES # BLD AUTO: 0.01 X10(3) UL (ref 0–1)
IMM GRANULOCYTES NFR BLD: 0.2 %
LYMPHOCYTES # BLD AUTO: 2.37 X10(3) UL (ref 1–4)
LYMPHOCYTES NFR BLD AUTO: 42 %
M PROTEIN MFR SERPL ELPH: 8.1 G/DL (ref 6.4–8.2)
MCH RBC QN AUTO: 31.6 PG (ref 26–34)
MCHC RBC AUTO-ENTMCNC: 32.9 G/DL (ref 31–37)
MCV RBC AUTO: 96.1 FL (ref 80–100)
MONOCYTES # BLD AUTO: 0.63 X10(3) UL (ref 0.1–1)
MONOCYTES NFR BLD AUTO: 11.2 %
NEUTROPHILS # BLD AUTO: 2.45 X10 (3) UL (ref 1.5–7.7)
NEUTROPHILS # BLD AUTO: 2.45 X10(3) UL (ref 1.5–7.7)
NEUTROPHILS NFR BLD AUTO: 43.4 %
OSMOLALITY SERPL CALC.SUM OF ELEC: 285 MOSM/KG (ref 275–295)
PATIENT FASTING Y/N/NP: YES
PLATELET # BLD AUTO: 250 10(3)UL (ref 150–450)
POTASSIUM SERPL-SCNC: 3.9 MMOL/L (ref 3.5–5.1)
RBC # BLD AUTO: 4.08 X10(6)UL (ref 3.8–5.3)
SED RATE-ML: 28 MM/HR (ref 0–25)
SODIUM SERPL-SCNC: 138 MMOL/L (ref 136–145)
VIT B12 SERPL-MCNC: 640 PG/ML (ref 193–986)
WBC # BLD AUTO: 5.6 X10(3) UL (ref 4–11)

## 2020-01-14 PROCEDURE — 80053 COMPREHEN METABOLIC PANEL: CPT

## 2020-01-14 PROCEDURE — 85652 RBC SED RATE AUTOMATED: CPT

## 2020-01-14 PROCEDURE — 82607 VITAMIN B-12: CPT

## 2020-01-14 PROCEDURE — 82746 ASSAY OF FOLIC ACID SERUM: CPT

## 2020-01-14 PROCEDURE — 36415 COLL VENOUS BLD VENIPUNCTURE: CPT

## 2020-01-14 PROCEDURE — 85025 COMPLETE CBC W/AUTO DIFF WBC: CPT

## 2020-01-14 PROCEDURE — 86706 HEP B SURFACE ANTIBODY: CPT

## 2020-01-14 PROCEDURE — 86140 C-REACTIVE PROTEIN: CPT

## 2020-01-15 RX ORDER — DIPHENHYDRAMINE HCL 25 MG
25 CAPSULE ORAL ONCE
Status: CANCELLED | OUTPATIENT
Start: 2020-01-15

## 2020-01-15 RX ORDER — ACETAMINOPHEN 325 MG/1
650 TABLET ORAL ONCE
Status: CANCELLED | OUTPATIENT
Start: 2020-01-15

## 2020-01-15 RX ORDER — DIPHENHYDRAMINE HYDROCHLORIDE 50 MG/ML
25 INJECTION INTRAMUSCULAR; INTRAVENOUS ONCE
Status: CANCELLED | OUTPATIENT
Start: 2020-01-15

## 2020-01-21 ENCOUNTER — OFFICE VISIT (OUTPATIENT)
Dept: HEMATOLOGY/ONCOLOGY | Facility: HOSPITAL | Age: 60
End: 2020-01-21
Attending: INTERNAL MEDICINE
Payer: COMMERCIAL

## 2020-01-21 VITALS
OXYGEN SATURATION: 99 % | TEMPERATURE: 97 F | SYSTOLIC BLOOD PRESSURE: 118 MMHG | HEART RATE: 56 BPM | RESPIRATION RATE: 16 BRPM | DIASTOLIC BLOOD PRESSURE: 65 MMHG

## 2020-01-21 DIAGNOSIS — K50.80 CROHN'S DISEASE OF BOTH SMALL AND LARGE INTESTINE WITHOUT COMPLICATION (HCC): Primary | ICD-10-CM

## 2020-01-21 DIAGNOSIS — K50.919 CROHN'S DISEASE WITH COMPLICATION, UNSPECIFIED GASTROINTESTINAL TRACT LOCATION (HCC): ICD-10-CM

## 2020-01-21 PROCEDURE — 96415 CHEMO IV INFUSION ADDL HR: CPT

## 2020-01-21 PROCEDURE — 96413 CHEMO IV INFUSION 1 HR: CPT

## 2020-01-21 RX ORDER — DIPHENHYDRAMINE HCL 25 MG
25 CAPSULE ORAL ONCE
Status: CANCELLED | OUTPATIENT
Start: 2020-03-17

## 2020-01-21 RX ORDER — ACETAMINOPHEN 325 MG/1
650 TABLET ORAL ONCE
Status: COMPLETED | OUTPATIENT
Start: 2020-01-21 | End: 2020-01-21

## 2020-01-21 RX ORDER — ACETAMINOPHEN 325 MG/1
650 TABLET ORAL ONCE
Status: CANCELLED | OUTPATIENT
Start: 2020-03-17

## 2020-01-21 RX ORDER — DIPHENHYDRAMINE HYDROCHLORIDE 50 MG/ML
25 INJECTION INTRAMUSCULAR; INTRAVENOUS ONCE
Status: CANCELLED | OUTPATIENT
Start: 2020-03-17

## 2020-01-21 RX ORDER — DIPHENHYDRAMINE HCL 25 MG
25 CAPSULE ORAL ONCE
Status: COMPLETED | OUTPATIENT
Start: 2020-01-21 | End: 2020-01-21

## 2020-01-21 RX ADMIN — DIPHENHYDRAMINE HCL 25 MG: 25 MG CAPSULE ORAL at 11:56:00

## 2020-01-21 RX ADMIN — ACETAMINOPHEN 650 MG: 325 TABLET ORAL at 11:55:00

## 2020-01-21 NOTE — PROGRESS NOTES
Education Record    Learner:  Patient    Disease / Diagnosis: chrons disease    Barriers / Limitations:  None   Comments:    Method:  Brief focused   Comments:    General Topics:  Plan of care reviewed   Comments:    Outcome:  Shows understanding   Comment

## 2020-03-16 ENCOUNTER — OFFICE VISIT (OUTPATIENT)
Dept: HEMATOLOGY/ONCOLOGY | Facility: HOSPITAL | Age: 60
End: 2020-03-16
Attending: INTERNAL MEDICINE
Payer: COMMERCIAL

## 2020-03-16 VITALS
OXYGEN SATURATION: 99 % | HEART RATE: 70 BPM | DIASTOLIC BLOOD PRESSURE: 76 MMHG | RESPIRATION RATE: 16 BRPM | TEMPERATURE: 99 F | SYSTOLIC BLOOD PRESSURE: 112 MMHG

## 2020-03-16 DIAGNOSIS — K50.80 CROHN'S DISEASE OF BOTH SMALL AND LARGE INTESTINE WITHOUT COMPLICATION (HCC): Primary | ICD-10-CM

## 2020-03-16 DIAGNOSIS — K50.919 CROHN'S DISEASE WITH COMPLICATION, UNSPECIFIED GASTROINTESTINAL TRACT LOCATION (HCC): ICD-10-CM

## 2020-03-16 PROCEDURE — 96413 CHEMO IV INFUSION 1 HR: CPT

## 2020-03-16 PROCEDURE — 96415 CHEMO IV INFUSION ADDL HR: CPT

## 2020-03-16 RX ORDER — DIPHENHYDRAMINE HCL 25 MG
25 CAPSULE ORAL ONCE
Status: COMPLETED | OUTPATIENT
Start: 2020-03-16 | End: 2020-03-16

## 2020-03-16 RX ORDER — ACETAMINOPHEN 325 MG/1
650 TABLET ORAL ONCE
Status: CANCELLED | OUTPATIENT
Start: 2020-05-11

## 2020-03-16 RX ORDER — DIPHENHYDRAMINE HCL 25 MG
25 CAPSULE ORAL ONCE
Status: CANCELLED | OUTPATIENT
Start: 2020-05-11

## 2020-03-16 RX ORDER — ACETAMINOPHEN 325 MG/1
650 TABLET ORAL ONCE
Status: COMPLETED | OUTPATIENT
Start: 2020-03-16 | End: 2020-03-16

## 2020-03-16 RX ORDER — DIPHENHYDRAMINE HYDROCHLORIDE 50 MG/ML
25 INJECTION INTRAMUSCULAR; INTRAVENOUS ONCE
Status: CANCELLED | OUTPATIENT
Start: 2020-05-11

## 2020-03-16 RX ADMIN — ACETAMINOPHEN 650 MG: 325 TABLET ORAL at 13:47:00

## 2020-03-16 RX ADMIN — DIPHENHYDRAMINE HCL 25 MG: 25 MG CAPSULE ORAL at 13:48:00

## 2020-05-11 ENCOUNTER — OFFICE VISIT (OUTPATIENT)
Dept: HEMATOLOGY/ONCOLOGY | Facility: HOSPITAL | Age: 60
End: 2020-05-11
Attending: INTERNAL MEDICINE
Payer: COMMERCIAL

## 2020-05-11 VITALS
SYSTOLIC BLOOD PRESSURE: 137 MMHG | WEIGHT: 196 LBS | RESPIRATION RATE: 16 BRPM | HEIGHT: 65.98 IN | TEMPERATURE: 99 F | HEART RATE: 68 BPM | BODY MASS INDEX: 31.5 KG/M2 | OXYGEN SATURATION: 98 % | DIASTOLIC BLOOD PRESSURE: 82 MMHG

## 2020-05-11 DIAGNOSIS — K50.80 CROHN'S DISEASE OF BOTH SMALL AND LARGE INTESTINE WITHOUT COMPLICATION (HCC): Primary | ICD-10-CM

## 2020-05-11 DIAGNOSIS — K50.919 CROHN'S DISEASE WITH COMPLICATION, UNSPECIFIED GASTROINTESTINAL TRACT LOCATION (HCC): ICD-10-CM

## 2020-05-11 PROCEDURE — 96415 CHEMO IV INFUSION ADDL HR: CPT

## 2020-05-11 PROCEDURE — 96413 CHEMO IV INFUSION 1 HR: CPT

## 2020-05-11 RX ORDER — ACETAMINOPHEN 325 MG/1
650 TABLET ORAL ONCE
Status: CANCELLED | OUTPATIENT
Start: 2020-07-06

## 2020-05-11 RX ORDER — DIPHENHYDRAMINE HCL 25 MG
25 CAPSULE ORAL ONCE
Status: CANCELLED | OUTPATIENT
Start: 2020-07-06

## 2020-05-11 RX ORDER — DIPHENHYDRAMINE HYDROCHLORIDE 50 MG/ML
25 INJECTION INTRAMUSCULAR; INTRAVENOUS ONCE
Status: CANCELLED | OUTPATIENT
Start: 2020-07-06

## 2020-05-11 RX ORDER — ACETAMINOPHEN 325 MG/1
650 TABLET ORAL ONCE
Status: COMPLETED | OUTPATIENT
Start: 2020-05-11 | End: 2020-05-11

## 2020-05-11 RX ORDER — DIPHENHYDRAMINE HCL 25 MG
25 CAPSULE ORAL ONCE
Status: COMPLETED | OUTPATIENT
Start: 2020-05-11 | End: 2020-05-11

## 2020-05-11 RX ADMIN — ACETAMINOPHEN 650 MG: 325 TABLET ORAL at 13:11:00

## 2020-05-11 RX ADMIN — DIPHENHYDRAMINE HCL 25 MG: 25 MG CAPSULE ORAL at 13:11:00

## 2020-05-11 NOTE — TELEPHONE ENCOUNTER
Diagnosis:   Crohn's    Last OV :  1-3-20    Last Colonoscopy : 11-3-17    Recall : 3 Years    due: 11-3-20    Last CBC, CMP : 1-14-20    Last Quantiferon TB Gold : (negative) 8-5-19    Hepatitis B Testing :1-14-20    Treatment:  Azathioprine 50mg tablet;

## 2020-05-11 NOTE — PROGRESS NOTES
Education Record    Learner:  Patient    Disease / Diagnosis:Crohn's disease of both small and large intestine without complication (    Barriers / Limitations:  None   Comments:    Method:  Brief focused   Comments:    General Topics:  Infection, Minus Meter

## 2020-05-12 RX ORDER — AZATHIOPRINE 50 MG/1
50 TABLET ORAL
Qty: 90 TABLET | Refills: 3 | Status: SHIPPED | OUTPATIENT
Start: 2020-05-12 | End: 2021-05-04

## 2020-06-13 ENCOUNTER — HOSPITAL ENCOUNTER (OUTPATIENT)
Age: 60
Discharge: HOME | End: 2020-06-13
Attending: FAMILY MEDICINE
Payer: COMMERCIAL

## 2020-06-13 VITALS
HEART RATE: 60 BPM | HEIGHT: 66 IN | TEMPERATURE: 99 F | DIASTOLIC BLOOD PRESSURE: 70 MMHG | BODY MASS INDEX: 30.05 KG/M2 | SYSTOLIC BLOOD PRESSURE: 132 MMHG | RESPIRATION RATE: 18 BRPM | OXYGEN SATURATION: 96 % | WEIGHT: 187 LBS

## 2020-06-13 DIAGNOSIS — K11.20 SIALADENITIS: Primary | ICD-10-CM

## 2020-06-13 PROCEDURE — 99214 OFFICE O/P EST MOD 30 MIN: CPT

## 2020-06-13 PROCEDURE — 99213 OFFICE O/P EST LOW 20 MIN: CPT

## 2020-06-13 RX ORDER — DOXYCYCLINE HYCLATE 100 MG/1
100 CAPSULE ORAL 2 TIMES DAILY
Qty: 20 CAPSULE | Refills: 0 | Status: SHIPPED | OUTPATIENT
Start: 2020-06-13 | End: 2021-03-12 | Stop reason: ALTCHOICE

## 2020-06-13 NOTE — ED INITIAL ASSESSMENT (HPI)
Patient states for the last week and progressively getting worse. She states the pain radiates to her teeth and her jaw. She states a few months ago she was here with the same thing and it was an infected salivary gland.  She has tried to suck on misty an

## 2020-06-13 NOTE — ED PROVIDER NOTES
Patient Seen in: [facility]      History   Patient presents with:  Ear Problem Pain    Stated Complaint: RIGHT EAR/JAW PAIN X 1 WK    HPI    This 63-year-old female presents the office with complaint of right-sided jaw pain.   Sh drinks      Types: 4 Glasses of wine per week    Drug use: No             Review of Systems    Positive for stated complaint: RIGHT EAR/JAW PAIN X 1 WK  Other systems are as noted in HPI. Constitutional and vital signs reviewed.       All other systems rev worsen          Medications Prescribed:  Current Discharge Medication List    START taking these medications    Doxycycline Hyclate 100 MG Oral Cap  Take 1 capsule (100 mg total) by mouth 2 (two) times daily. DO NOT TAKE WITH MILK OR DAIRY PRODUCTS.   Qty:

## 2020-06-21 ENCOUNTER — TELEPHONE (OUTPATIENT)
Dept: INTERNAL MEDICINE CLINIC | Facility: CLINIC | Age: 60
End: 2020-06-21

## 2020-06-21 NOTE — TELEPHONE ENCOUNTER
Pt seen in er for sialadenitis. Given antibiotics. Was asked to follow up with me, no appt made yet. Please have pt make an appt if her symptoms have not completely resolved.

## 2020-06-22 NOTE — TELEPHONE ENCOUNTER
Left generic messages on home and cell phones to call back to schedule an ER follow up. Patient does not have identifying information on either phone.

## 2020-06-22 NOTE — TELEPHONE ENCOUNTER
Patient is feeling better - not 100% yet. She has not finished her antibiotic and will call back if she doesn't feel better after she finishes it.

## 2020-07-06 ENCOUNTER — OFFICE VISIT (OUTPATIENT)
Dept: HEMATOLOGY/ONCOLOGY | Facility: HOSPITAL | Age: 60
End: 2020-07-06
Attending: INTERNAL MEDICINE
Payer: COMMERCIAL

## 2020-07-06 VITALS
TEMPERATURE: 99 F | OXYGEN SATURATION: 96 % | RESPIRATION RATE: 16 BRPM | SYSTOLIC BLOOD PRESSURE: 117 MMHG | HEART RATE: 64 BPM | DIASTOLIC BLOOD PRESSURE: 79 MMHG

## 2020-07-06 DIAGNOSIS — K50.80 CROHN'S DISEASE OF BOTH SMALL AND LARGE INTESTINE WITHOUT COMPLICATION (HCC): Primary | ICD-10-CM

## 2020-07-06 DIAGNOSIS — K50.919 CROHN'S DISEASE WITH COMPLICATION, UNSPECIFIED GASTROINTESTINAL TRACT LOCATION (HCC): ICD-10-CM

## 2020-07-06 PROCEDURE — 96413 CHEMO IV INFUSION 1 HR: CPT

## 2020-07-06 PROCEDURE — 96415 CHEMO IV INFUSION ADDL HR: CPT

## 2020-07-06 RX ORDER — ACETAMINOPHEN 325 MG/1
650 TABLET ORAL ONCE
Status: CANCELLED | OUTPATIENT
Start: 2020-08-31

## 2020-07-06 RX ORDER — DIPHENHYDRAMINE HCL 25 MG
25 CAPSULE ORAL ONCE
Status: COMPLETED | OUTPATIENT
Start: 2020-07-06 | End: 2020-07-06

## 2020-07-06 RX ORDER — DIPHENHYDRAMINE HCL 25 MG
25 CAPSULE ORAL ONCE
Status: CANCELLED | OUTPATIENT
Start: 2020-08-31

## 2020-07-06 RX ORDER — DIPHENHYDRAMINE HYDROCHLORIDE 50 MG/ML
25 INJECTION INTRAMUSCULAR; INTRAVENOUS ONCE
Status: CANCELLED | OUTPATIENT
Start: 2020-08-31

## 2020-07-06 RX ORDER — ACETAMINOPHEN 325 MG/1
650 TABLET ORAL ONCE
Status: COMPLETED | OUTPATIENT
Start: 2020-07-06 | End: 2020-07-06

## 2020-07-06 RX ADMIN — ACETAMINOPHEN 650 MG: 325 TABLET ORAL at 13:00:00

## 2020-07-06 RX ADMIN — DIPHENHYDRAMINE HCL 25 MG: 25 MG CAPSULE ORAL at 13:00:00

## 2020-08-31 ENCOUNTER — OFFICE VISIT (OUTPATIENT)
Dept: HEMATOLOGY/ONCOLOGY | Facility: HOSPITAL | Age: 60
End: 2020-08-31
Attending: INTERNAL MEDICINE
Payer: COMMERCIAL

## 2020-08-31 VITALS
SYSTOLIC BLOOD PRESSURE: 127 MMHG | RESPIRATION RATE: 16 BRPM | HEIGHT: 65.98 IN | WEIGHT: 191.63 LBS | TEMPERATURE: 100 F | BODY MASS INDEX: 30.8 KG/M2 | HEART RATE: 58 BPM | DIASTOLIC BLOOD PRESSURE: 74 MMHG | OXYGEN SATURATION: 99 %

## 2020-08-31 DIAGNOSIS — K50.919 CROHN'S DISEASE WITH COMPLICATION, UNSPECIFIED GASTROINTESTINAL TRACT LOCATION (HCC): ICD-10-CM

## 2020-08-31 DIAGNOSIS — K50.80 CROHN'S DISEASE OF BOTH SMALL AND LARGE INTESTINE WITHOUT COMPLICATION (HCC): Primary | ICD-10-CM

## 2020-08-31 PROCEDURE — 96413 CHEMO IV INFUSION 1 HR: CPT

## 2020-08-31 PROCEDURE — 96415 CHEMO IV INFUSION ADDL HR: CPT

## 2020-08-31 RX ORDER — DIPHENHYDRAMINE HCL 25 MG
25 CAPSULE ORAL ONCE
Status: CANCELLED | OUTPATIENT
Start: 2020-10-26

## 2020-08-31 RX ORDER — DIPHENHYDRAMINE HCL 25 MG
25 CAPSULE ORAL ONCE
Status: COMPLETED | OUTPATIENT
Start: 2020-08-31 | End: 2020-08-31

## 2020-08-31 RX ORDER — ACETAMINOPHEN 325 MG/1
650 TABLET ORAL ONCE
Status: COMPLETED | OUTPATIENT
Start: 2020-08-31 | End: 2020-08-31

## 2020-08-31 RX ORDER — ACETAMINOPHEN 325 MG/1
650 TABLET ORAL ONCE
Status: CANCELLED | OUTPATIENT
Start: 2020-10-26

## 2020-08-31 RX ORDER — DIPHENHYDRAMINE HYDROCHLORIDE 50 MG/ML
25 INJECTION INTRAMUSCULAR; INTRAVENOUS ONCE
Status: CANCELLED | OUTPATIENT
Start: 2020-10-26

## 2020-08-31 RX ADMIN — ACETAMINOPHEN 650 MG: 325 TABLET ORAL at 13:11:00

## 2020-08-31 RX ADMIN — DIPHENHYDRAMINE HCL 25 MG: 25 MG CAPSULE ORAL at 13:11:00

## 2020-08-31 NOTE — PROGRESS NOTES
Education Record    Learner:  Patient    Disease / Diagnosis:Crohns disease    Barriers / Limitations:  None   Comments:    Method:  Discussion   Comments:    General Topics:  Medication, Side effects and symptom management and Plan of care reviewed   Comm

## 2020-10-08 ENCOUNTER — LAB ENCOUNTER (OUTPATIENT)
Dept: LAB | Age: 60
End: 2020-10-08
Attending: NURSE PRACTITIONER
Payer: COMMERCIAL

## 2020-10-08 DIAGNOSIS — K50.00 CROHN'S DISEASE OF SMALL INTESTINE WITHOUT COMPLICATION (HCC): ICD-10-CM

## 2020-10-08 PROCEDURE — 86480 TB TEST CELL IMMUN MEASURE: CPT

## 2020-10-08 PROCEDURE — 36415 COLL VENOUS BLD VENIPUNCTURE: CPT

## 2020-10-26 ENCOUNTER — APPOINTMENT (OUTPATIENT)
Dept: HEMATOLOGY/ONCOLOGY | Facility: HOSPITAL | Age: 60
End: 2020-10-26
Attending: INTERNAL MEDICINE
Payer: COMMERCIAL

## 2021-02-03 ENCOUNTER — PATIENT MESSAGE (OUTPATIENT)
Dept: INTERNAL MEDICINE CLINIC | Facility: CLINIC | Age: 61
End: 2021-02-03

## 2021-02-03 NOTE — TELEPHONE ENCOUNTER
From: Cecily Wolf  To: Moises Schwarz MD  Sent: 2/3/2021 8:33 AM CST  Subject: Non-Urgent Medical Question    Good morning! I just wanted to know if or when this office will be offering Covid-19 vaccines.  Thank you, Cecily Blanco

## 2021-03-12 ENCOUNTER — APPOINTMENT (OUTPATIENT)
Dept: GENERAL RADIOLOGY | Age: 61
End: 2021-03-12
Attending: PHYSICIAN ASSISTANT
Payer: COMMERCIAL

## 2021-03-12 ENCOUNTER — HOSPITAL ENCOUNTER (OUTPATIENT)
Age: 61
Discharge: HOME | End: 2021-03-12
Payer: COMMERCIAL

## 2021-03-12 VITALS
RESPIRATION RATE: 14 BRPM | WEIGHT: 177 LBS | BODY MASS INDEX: 28.45 KG/M2 | DIASTOLIC BLOOD PRESSURE: 71 MMHG | TEMPERATURE: 98 F | SYSTOLIC BLOOD PRESSURE: 152 MMHG | HEIGHT: 66 IN | OXYGEN SATURATION: 99 % | HEART RATE: 60 BPM

## 2021-03-12 DIAGNOSIS — S52.592A OTHER CLOSED FRACTURE OF DISTAL END OF LEFT RADIUS, INITIAL ENCOUNTER: Primary | ICD-10-CM

## 2021-03-12 PROCEDURE — 29125 APPL SHORT ARM SPLINT STATIC: CPT | Performed by: PHYSICIAN ASSISTANT

## 2021-03-12 PROCEDURE — 73110 X-RAY EXAM OF WRIST: CPT | Performed by: PHYSICIAN ASSISTANT

## 2021-03-12 PROCEDURE — 99214 OFFICE O/P EST MOD 30 MIN: CPT | Performed by: PHYSICIAN ASSISTANT

## 2021-03-12 PROCEDURE — 99213 OFFICE O/P EST LOW 20 MIN: CPT | Performed by: PHYSICIAN ASSISTANT

## 2021-03-12 NOTE — ED PROVIDER NOTES
Patient Seen in: [facility]      History   Patient presents with:  Fall  Wrist Injury    Stated Complaint: Left wrist injury    HPI/Subjective:   HPI    Hedy Boyer is a 57-year-old female who presents today for evaluation of an injury to her l wrist injury  Other systems are as noted in HPI. Constitutional and vital signs reviewed. All other systems reviewed and negative except as noted above. Physical Exam     ED Triage Vitals [03/12/21 1642]   /71   Pulse 60   Resp 14   Temp 97. 3/12/2021 at 5:31 PM      Patient is informed of her imaging findings. I reviewed the films with her in the exam room. She is instructed on RICE and may use Tylenol for pain, as she has had issues with NSAIDs due to her Crohn's disease.     A short arm fi

## 2021-03-12 NOTE — ED INITIAL ASSESSMENT (HPI)
Pt presents today with c/o trip and fall while walking her dog today around 1500. Pt states that she fell onto her left hip/thigh and her left wrist. Pt has swelling and pain to left wrist. Pt denies hitting her head or any LOC.

## 2021-03-15 ENCOUNTER — OFFICE VISIT (OUTPATIENT)
Dept: ORTHOPEDICS CLINIC | Facility: CLINIC | Age: 61
End: 2021-03-15
Payer: COMMERCIAL

## 2021-03-15 VITALS — OXYGEN SATURATION: 100 % | HEART RATE: 69 BPM

## 2021-03-15 DIAGNOSIS — S52.532A CLOSED COLLES' FRACTURE OF LEFT RADIUS, INITIAL ENCOUNTER: Primary | ICD-10-CM

## 2021-03-15 PROCEDURE — 99203 OFFICE O/P NEW LOW 30 MIN: CPT | Performed by: ORTHOPAEDIC SURGERY

## 2021-03-15 PROCEDURE — L3908 WHO COCK-UP NONMOLDE PRE OTS: HCPCS | Performed by: ORTHOPAEDIC SURGERY

## 2021-03-15 PROCEDURE — 25600 CLTX DST RDL FX/EPHYS SEP WO: CPT | Performed by: ORTHOPAEDIC SURGERY

## 2021-03-15 NOTE — H&P
[facility] New Patient Note    CC: Left nondisplaced distal radius fracture    DOI: 3/12/2021    HPI: This 61year old right-hand-dominant female presents with left nondisplaced radial fracture.   She sustained it when she fell onto an outstretched ha Grandmother    • Blood Disorder Other         blood clots, fam hx     Social History    Occupational History      Not on file    Tobacco Use      Smoking status: Never Smoker      Smokeless tobacco: Never Used    Vaping Use      Vaping Use: Never used    S Wrist, & Elbow Surgery  [facility]  [facility] [address], [address] [facility], [address] org  [email or web address]  t: [id number]  f: [phone]

## 2021-03-23 ENCOUNTER — LAB ENCOUNTER (OUTPATIENT)
Dept: LAB | Age: 61
End: 2021-03-23
Attending: INTERNAL MEDICINE
Payer: COMMERCIAL

## 2021-03-23 DIAGNOSIS — K50.80 CROHN'S DISEASE OF BOTH SMALL AND LARGE INTESTINE WITHOUT COMPLICATION (HCC): ICD-10-CM

## 2021-03-23 DIAGNOSIS — K50.00 CROHN'S DISEASE OF SMALL INTESTINE WITHOUT COMPLICATION (HCC): Primary | ICD-10-CM

## 2021-03-23 LAB
ALBUMIN SERPL-MCNC: 3.5 G/DL (ref 3.4–5)
ALBUMIN/GLOB SERPL: 0.8 {RATIO} (ref 1–2)
ALP LIVER SERPL-CCNC: 58 U/L
ALT SERPL-CCNC: 24 U/L
ANION GAP SERPL CALC-SCNC: 5 MMOL/L (ref 0–18)
AST SERPL-CCNC: 17 U/L (ref 15–37)
BASOPHILS # BLD AUTO: 0.04 X10(3) UL (ref 0–0.2)
BASOPHILS NFR BLD AUTO: 0.7 %
BILIRUB SERPL-MCNC: 0.6 MG/DL (ref 0.1–2)
BUN BLD-MCNC: 7 MG/DL (ref 7–18)
BUN/CREAT SERPL: 10.9 (ref 10–20)
CALCIUM BLD-MCNC: 9.2 MG/DL (ref 8.5–10.1)
CHLORIDE SERPL-SCNC: 104 MMOL/L (ref 98–112)
CO2 SERPL-SCNC: 26 MMOL/L (ref 21–32)
CREAT BLD-MCNC: 0.64 MG/DL
CRP SERPL-MCNC: <0.29 MG/DL (ref ?–0.3)
DEPRECATED RDW RBC AUTO: 45.9 FL (ref 35.1–46.3)
EOSINOPHIL # BLD AUTO: 0.13 X10(3) UL (ref 0–0.7)
EOSINOPHIL NFR BLD AUTO: 2.4 %
ERYTHROCYTE [DISTWIDTH] IN BLOOD BY AUTOMATED COUNT: 13.3 % (ref 11–15)
GLOBULIN PLAS-MCNC: 4.2 G/DL (ref 2.8–4.4)
GLUCOSE BLD-MCNC: 79 MG/DL (ref 70–99)
HCT VFR BLD AUTO: 37.1 %
HGB BLD-MCNC: 12.4 G/DL
IMM GRANULOCYTES # BLD AUTO: 0.01 X10(3) UL (ref 0–1)
IMM GRANULOCYTES NFR BLD: 0.2 %
LYMPHOCYTES # BLD AUTO: 1.69 X10(3) UL (ref 1–4)
LYMPHOCYTES NFR BLD AUTO: 31.6 %
M PROTEIN MFR SERPL ELPH: 7.7 G/DL (ref 6.4–8.2)
MCH RBC QN AUTO: 31.4 PG (ref 26–34)
MCHC RBC AUTO-ENTMCNC: 33.4 G/DL (ref 31–37)
MCV RBC AUTO: 93.9 FL
MONOCYTES # BLD AUTO: 0.45 X10(3) UL (ref 0.1–1)
MONOCYTES NFR BLD AUTO: 8.4 %
NEUTROPHILS # BLD AUTO: 3.03 X10 (3) UL (ref 1.5–7.7)
NEUTROPHILS # BLD AUTO: 3.03 X10(3) UL (ref 1.5–7.7)
NEUTROPHILS NFR BLD AUTO: 56.7 %
OSMOLALITY SERPL CALC.SUM OF ELEC: 277 MOSM/KG (ref 275–295)
PATIENT FASTING Y/N/NP: YES
PLATELET # BLD AUTO: 276 10(3)UL (ref 150–450)
POTASSIUM SERPL-SCNC: 4.2 MMOL/L (ref 3.5–5.1)
RBC # BLD AUTO: 3.95 X10(6)UL
SODIUM SERPL-SCNC: 135 MMOL/L (ref 136–145)
WBC # BLD AUTO: 5.4 X10(3) UL (ref 4–11)

## 2021-03-23 PROCEDURE — 80053 COMPREHEN METABOLIC PANEL: CPT

## 2021-03-23 PROCEDURE — 86480 TB TEST CELL IMMUN MEASURE: CPT

## 2021-03-23 PROCEDURE — 86140 C-REACTIVE PROTEIN: CPT

## 2021-03-23 PROCEDURE — 85025 COMPLETE CBC W/AUTO DIFF WBC: CPT

## 2021-03-23 PROCEDURE — 36415 COLL VENOUS BLD VENIPUNCTURE: CPT

## 2021-03-25 LAB
M TB IFN-G CD4+ T-CELLS BLD-ACNC: 0.07 IU/ML
M TB TUBERC IFN-G BLD QL: NEGATIVE
M TB TUBERC IGNF/MITOGEN IGNF CONTROL: >10 IU/ML
QFT TB1 AG MINUS NIL: 0.03 IU/ML
QFT TB2 AG MINUS NIL: 0.02 IU/ML

## 2021-04-05 ENCOUNTER — OFFICE VISIT (OUTPATIENT)
Dept: ORTHOPEDICS CLINIC | Facility: CLINIC | Age: 61
End: 2021-04-05
Payer: COMMERCIAL

## 2021-04-05 ENCOUNTER — HOSPITAL ENCOUNTER (OUTPATIENT)
Dept: GENERAL RADIOLOGY | Age: 61
Discharge: HOME | End: 2021-04-05
Attending: ORTHOPAEDIC SURGERY
Payer: COMMERCIAL

## 2021-04-05 VITALS — HEART RATE: 58 BPM | OXYGEN SATURATION: 99 %

## 2021-04-05 DIAGNOSIS — M25.539 PAIN IN WRIST, UNSPECIFIED LATERALITY: Primary | ICD-10-CM

## 2021-04-05 DIAGNOSIS — M25.539 PAIN IN WRIST, UNSPECIFIED LATERALITY: ICD-10-CM

## 2021-04-05 PROCEDURE — 73110 X-RAY EXAM OF WRIST: CPT | Performed by: ORTHOPAEDIC SURGERY

## 2021-04-05 PROCEDURE — 99024 POSTOP FOLLOW-UP VISIT: CPT | Performed by: ORTHOPAEDIC SURGERY

## 2021-04-26 ENCOUNTER — HOSPITAL ENCOUNTER (OUTPATIENT)
Dept: GENERAL RADIOLOGY | Age: 61
Discharge: HOME | End: 2021-04-26
Attending: ORTHOPAEDIC SURGERY
Payer: COMMERCIAL

## 2021-04-26 ENCOUNTER — OFFICE VISIT (OUTPATIENT)
Dept: ORTHOPEDICS CLINIC | Facility: CLINIC | Age: 61
End: 2021-04-26
Payer: COMMERCIAL

## 2021-04-26 VITALS — HEART RATE: 58 BPM | OXYGEN SATURATION: 100 %

## 2021-04-26 DIAGNOSIS — S52.532A CLOSED COLLES' FRACTURE OF LEFT RADIUS, INITIAL ENCOUNTER: ICD-10-CM

## 2021-04-26 DIAGNOSIS — S52.532A CLOSED COLLES' FRACTURE OF LEFT RADIUS, INITIAL ENCOUNTER: Primary | ICD-10-CM

## 2021-04-26 PROCEDURE — 73110 X-RAY EXAM OF WRIST: CPT | Performed by: ORTHOPAEDIC SURGERY

## 2021-04-26 PROCEDURE — 99024 POSTOP FOLLOW-UP VISIT: CPT | Performed by: ORTHOPAEDIC SURGERY

## 2021-04-26 NOTE — PROGRESS NOTES
[facility] New Patient Note    CC: Left nondisplaced distal radius fracture    DOI: 3/12/2021    HPI: This 61year old right-hand-dominant female presents with left nondisplaced radial fracture.   She sustained it when she fell onto an outstretched ha Blood Disorder Other         blood clots, fam hx     Social History    Occupational History      Not on file    Tobacco Use      Smoking status: Never Smoker      Smokeless tobacco: Never Used    Vaping Use      Vaping Use: Never used    Substance and Sexu weeks she can let my office know and I will prescribe her a short course of therapy. Diania Epley, MD  Hand, Wrist, & Elbow Surgery  [facility] [place] [facility]  [facility] [address] [facility], [facility], [address] [facility]. [facility]  [email or web address]  t:

## 2021-05-04 NOTE — TELEPHONE ENCOUNTER
Diagnosis:  Crohn's    Last OV : 2-9-21       Last Colonoscopy : 11-3-2017    Last CBC, CMP : 3-23-21    Last Quantiferon TB Gold : (negative) 3-23-21      Treatment:  Azathioprine 50mg tablet; take 1 tablet by mouth daily.

## 2021-05-05 RX ORDER — AZATHIOPRINE 50 MG/1
50 TABLET ORAL
Qty: 90 TABLET | Refills: 3 | Status: SHIPPED | OUTPATIENT
Start: 2021-05-05

## 2021-06-04 ENCOUNTER — TELEPHONE (OUTPATIENT)
Dept: INTERNAL MEDICINE CLINIC | Facility: CLINIC | Age: 61
End: 2021-06-04

## 2022-04-21 ENCOUNTER — LAB ENCOUNTER (OUTPATIENT)
Dept: LAB | Age: 62
End: 2022-04-21
Payer: COMMERCIAL

## 2022-04-21 DIAGNOSIS — K50.10 CROHN'S DISEASE OF COLON WITHOUT COMPLICATION (HCC): ICD-10-CM

## 2022-04-21 DIAGNOSIS — D64.9 LOW HEMOGLOBIN: ICD-10-CM

## 2022-04-21 LAB
ALBUMIN SERPL-MCNC: 3.5 G/DL (ref 3.4–5)
ALBUMIN/GLOB SERPL: 0.9 {RATIO} (ref 1–2)
ALP LIVER SERPL-CCNC: 66 U/L
ALT SERPL-CCNC: 24 U/L
ANION GAP SERPL CALC-SCNC: 5 MMOL/L (ref 0–18)
AST SERPL-CCNC: 23 U/L (ref 15–37)
BASOPHILS # BLD AUTO: 0.04 X10(3) UL (ref 0–0.2)
BASOPHILS NFR BLD AUTO: 0.9 %
BILIRUB SERPL-MCNC: 0.5 MG/DL (ref 0.1–2)
BUN BLD-MCNC: 7 MG/DL (ref 7–18)
CALCIUM BLD-MCNC: 9.1 MG/DL (ref 8.5–10.1)
CHLORIDE SERPL-SCNC: 105 MMOL/L (ref 98–112)
CO2 SERPL-SCNC: 27 MMOL/L (ref 21–32)
CREAT BLD-MCNC: 0.7 MG/DL
CRP SERPL-MCNC: <0.29 MG/DL (ref ?–0.3)
EOSINOPHIL # BLD AUTO: 0.16 X10(3) UL (ref 0–0.7)
EOSINOPHIL NFR BLD AUTO: 3.5 %
ERYTHROCYTE [DISTWIDTH] IN BLOOD BY AUTOMATED COUNT: 13.2 %
FASTING STATUS PATIENT QL REPORTED: YES
GLOBULIN PLAS-MCNC: 3.7 G/DL (ref 2.8–4.4)
GLUCOSE BLD-MCNC: 78 MG/DL (ref 70–99)
HCT VFR BLD AUTO: 36.5 %
HGB BLD-MCNC: 11.5 G/DL
IMM GRANULOCYTES # BLD AUTO: 0.01 X10(3) UL (ref 0–1)
IMM GRANULOCYTES NFR BLD: 0.2 %
LYMPHOCYTES # BLD AUTO: 1.42 X10(3) UL (ref 1–4)
LYMPHOCYTES NFR BLD AUTO: 31.2 %
MCH RBC QN AUTO: 30.5 PG (ref 26–34)
MCHC RBC AUTO-ENTMCNC: 31.5 G/DL (ref 31–37)
MCV RBC AUTO: 96.8 FL
MONOCYTES # BLD AUTO: 0.45 X10(3) UL (ref 0.1–1)
MONOCYTES NFR BLD AUTO: 9.9 %
NEUTROPHILS # BLD AUTO: 2.47 X10 (3) UL (ref 1.5–7.7)
NEUTROPHILS # BLD AUTO: 2.47 X10(3) UL (ref 1.5–7.7)
NEUTROPHILS NFR BLD AUTO: 54.3 %
OSMOLALITY SERPL CALC.SUM OF ELEC: 281 MOSM/KG (ref 275–295)
PLATELET # BLD AUTO: 253 10(3)UL (ref 150–450)
POTASSIUM SERPL-SCNC: 4.4 MMOL/L (ref 3.5–5.1)
PROT SERPL-MCNC: 7.2 G/DL (ref 6.4–8.2)
RBC # BLD AUTO: 3.77 X10(6)UL
SODIUM SERPL-SCNC: 137 MMOL/L (ref 136–145)
WBC # BLD AUTO: 4.6 X10(3) UL (ref 4–11)

## 2022-04-21 PROCEDURE — 86140 C-REACTIVE PROTEIN: CPT

## 2022-04-21 PROCEDURE — 83540 ASSAY OF IRON: CPT

## 2022-04-21 PROCEDURE — 36415 COLL VENOUS BLD VENIPUNCTURE: CPT

## 2022-04-21 PROCEDURE — 82728 ASSAY OF FERRITIN: CPT

## 2022-04-21 PROCEDURE — 86480 TB TEST CELL IMMUN MEASURE: CPT

## 2022-04-21 PROCEDURE — 83550 IRON BINDING TEST: CPT

## 2022-04-21 PROCEDURE — 85025 COMPLETE CBC W/AUTO DIFF WBC: CPT

## 2022-04-21 PROCEDURE — 80053 COMPREHEN METABOLIC PANEL: CPT

## 2022-04-25 LAB
DEPRECATED HBV CORE AB SER IA-ACNC: 14 NG/ML
IRON SATN MFR SERPL: 19 %
IRON SERPL-MCNC: 78 UG/DL
M TB IFN-G CD4+ T-CELLS BLD-ACNC: 0.08 IU/ML
M TB TUBERC IFN-G BLD QL: NEGATIVE
M TB TUBERC IGNF/MITOGEN IGNF CONTROL: >10 IU/ML
QFT TB1 AG MINUS NIL: -0.01 IU/ML
QFT TB2 AG MINUS NIL: -0.01 IU/ML
TIBC SERPL-MCNC: 420 UG/DL (ref 240–450)
TRANSFERRIN SERPL-MCNC: 282 MG/DL (ref 200–360)

## 2022-05-02 RX ORDER — AZATHIOPRINE 50 MG/1
50 TABLET ORAL
Qty: 90 TABLET | Refills: 3 | Status: SHIPPED | OUTPATIENT
Start: 2022-05-02

## 2022-05-02 NOTE — TELEPHONE ENCOUNTER
Diagnosis:  Crohn's    Last OV :  3-15-22    Last Colonoscopy : 11-3-2017     Last CBC, CMP : 4-21-22    Treatment: Azathioprine 50mg tablet; take one tablet by mouth daily

## 2022-08-26 NOTE — PROGRESS NOTES
[facility] New Patient Note    CC: Left nondisplaced distal radius fracture    DOI: 3/12/2021    HPI: This 61year old right-hand-dominant female presents with left nondisplaced radial fracture.   She sustained it when she fell onto an outstretched ha Other         blood clots, fam hx     Social History    Occupational History      Not on file    Tobacco Use      Smoking status: Never Smoker      Smokeless tobacco: Never Used    Vaping Use      Vaping Use: Never used    Substance and Sexual Activity Orthopaedic Surgery  [facility] [address], [address], [place], [address] [facility]  [email or web address]  t: [id number]  f: [phone] No

## 2022-11-09 ENCOUNTER — OFFICE VISIT (OUTPATIENT)
Dept: FAMILY MEDICINE CLINIC | Facility: CLINIC | Age: 62
End: 2022-11-09
Payer: COMMERCIAL

## 2022-11-09 VITALS
DIASTOLIC BLOOD PRESSURE: 74 MMHG | BODY MASS INDEX: 27.48 KG/M2 | RESPIRATION RATE: 16 BRPM | WEIGHT: 171 LBS | SYSTOLIC BLOOD PRESSURE: 122 MMHG | HEART RATE: 65 BPM | OXYGEN SATURATION: 99 % | HEIGHT: 66 IN

## 2022-11-09 DIAGNOSIS — Z13.29 SCREENING FOR ENDOCRINE, METABOLIC AND IMMUNITY DISORDER: ICD-10-CM

## 2022-11-09 DIAGNOSIS — Z12.31 ENCOUNTER FOR SCREENING MAMMOGRAM FOR MALIGNANT NEOPLASM OF BREAST: ICD-10-CM

## 2022-11-09 DIAGNOSIS — Z13.228 SCREENING FOR ENDOCRINE, METABOLIC AND IMMUNITY DISORDER: ICD-10-CM

## 2022-11-09 DIAGNOSIS — R79.89 ABNORMAL THYROID BLOOD TEST: ICD-10-CM

## 2022-11-09 DIAGNOSIS — Z13.0 SCREENING FOR ENDOCRINE, METABOLIC AND IMMUNITY DISORDER: ICD-10-CM

## 2022-11-09 DIAGNOSIS — Z00.00 ROUTINE GENERAL MEDICAL EXAMINATION AT A HEALTH CARE FACILITY: Primary | ICD-10-CM

## 2022-11-09 DIAGNOSIS — Z71.85 VACCINE COUNSELING: ICD-10-CM

## 2022-11-09 DIAGNOSIS — E66.3 OVERWEIGHT (BMI 25.0-29.9): ICD-10-CM

## 2022-11-09 DIAGNOSIS — I83.90 VARICOSE VEINS: ICD-10-CM

## 2022-11-09 DIAGNOSIS — Z00.00 LABORATORY EXAMINATION ORDERED AS PART OF A ROUTINE GENERAL MEDICAL EXAMINATION: ICD-10-CM

## 2022-11-09 DIAGNOSIS — I83.813 PAIN DUE TO VARICOSE VEINS OF BOTH LOWER EXTREMITIES: ICD-10-CM

## 2022-11-09 DIAGNOSIS — Z13.820 SCREENING FOR OSTEOPOROSIS: ICD-10-CM

## 2022-11-09 DIAGNOSIS — K50.919 CROHN'S DISEASE WITH COMPLICATION, UNSPECIFIED GASTROINTESTINAL TRACT LOCATION (HCC): ICD-10-CM

## 2022-11-09 DIAGNOSIS — Z12.4 SCREENING FOR MALIGNANT NEOPLASM OF CERVIX: ICD-10-CM

## 2022-11-09 DIAGNOSIS — Z13.89 SCREENING FOR GENITOURINARY CONDITION: ICD-10-CM

## 2022-11-09 DIAGNOSIS — Z11.59 ENCOUNTER FOR HEPATITIS C SCREENING TEST FOR LOW RISK PATIENT: ICD-10-CM

## 2022-11-09 LAB
ALBUMIN SERPL-MCNC: 3.6 G/DL (ref 3.4–5)
ALBUMIN/GLOB SERPL: 0.8 {RATIO} (ref 1–2)
ALP LIVER SERPL-CCNC: 66 U/L
ALT SERPL-CCNC: 30 U/L
ANION GAP SERPL CALC-SCNC: 7 MMOL/L (ref 0–18)
AST SERPL-CCNC: 23 U/L (ref 15–37)
BASOPHILS # BLD AUTO: 0.05 X10(3) UL (ref 0–0.2)
BASOPHILS NFR BLD AUTO: 0.8 %
BILIRUB SERPL-MCNC: 0.7 MG/DL (ref 0.1–2)
BILIRUB UR QL STRIP.AUTO: NEGATIVE
BUN BLD-MCNC: 9 MG/DL (ref 7–18)
CALCIUM BLD-MCNC: 9.2 MG/DL (ref 8.5–10.1)
CHLORIDE SERPL-SCNC: 103 MMOL/L (ref 98–112)
CHOLEST SERPL-MCNC: 154 MG/DL (ref ?–200)
CO2 SERPL-SCNC: 24 MMOL/L (ref 21–32)
COLOR UR AUTO: YELLOW
CREAT BLD-MCNC: 0.69 MG/DL
EOSINOPHIL # BLD AUTO: 0.12 X10(3) UL (ref 0–0.7)
EOSINOPHIL NFR BLD AUTO: 1.8 %
ERYTHROCYTE [DISTWIDTH] IN BLOOD BY AUTOMATED COUNT: 12.7 %
FASTING PATIENT LIPID ANSWER: NO
FASTING STATUS PATIENT QL REPORTED: NO
GFR SERPLBLD BASED ON 1.73 SQ M-ARVRAT: 99 ML/MIN/1.73M2 (ref 60–?)
GLOBULIN PLAS-MCNC: 4.3 G/DL (ref 2.8–4.4)
GLUCOSE BLD-MCNC: 88 MG/DL (ref 70–99)
GLUCOSE UR STRIP.AUTO-MCNC: NEGATIVE MG/DL
HAV AB SER QL IA: NONREACTIVE
HBV SURFACE AB SER QL: NONREACTIVE
HBV SURFACE AB SERPL IA-ACNC: <3.1 MIU/ML
HCT VFR BLD AUTO: 35.9 %
HCV AB SERPL QL IA: NONREACTIVE
HDLC SERPL-MCNC: 68 MG/DL (ref 40–59)
HGB BLD-MCNC: 11.8 G/DL
IMM GRANULOCYTES # BLD AUTO: 0.02 X10(3) UL (ref 0–1)
IMM GRANULOCYTES NFR BLD: 0.3 %
KETONES UR STRIP.AUTO-MCNC: NEGATIVE MG/DL
LDLC SERPL CALC-MCNC: 67 MG/DL (ref ?–100)
LYMPHOCYTES # BLD AUTO: 1.53 X10(3) UL (ref 1–4)
LYMPHOCYTES NFR BLD AUTO: 23.3 %
MCH RBC QN AUTO: 31.7 PG (ref 26–34)
MCHC RBC AUTO-ENTMCNC: 32.9 G/DL (ref 31–37)
MCV RBC AUTO: 96.5 FL
MONOCYTES # BLD AUTO: 0.68 X10(3) UL (ref 0.1–1)
MONOCYTES NFR BLD AUTO: 10.4 %
NEUTROPHILS # BLD AUTO: 4.17 X10 (3) UL (ref 1.5–7.7)
NEUTROPHILS # BLD AUTO: 4.17 X10(3) UL (ref 1.5–7.7)
NEUTROPHILS NFR BLD AUTO: 63.4 %
NITRITE UR QL STRIP.AUTO: NEGATIVE
NONHDLC SERPL-MCNC: 86 MG/DL (ref ?–130)
OSMOLALITY SERPL CALC.SUM OF ELEC: 276 MOSM/KG (ref 275–295)
PH UR STRIP.AUTO: 7 [PH] (ref 5–8)
PLATELET # BLD AUTO: 239 10(3)UL (ref 150–450)
POTASSIUM SERPL-SCNC: 4.2 MMOL/L (ref 3.5–5.1)
PROT SERPL-MCNC: 7.9 G/DL (ref 6.4–8.2)
PROT UR STRIP.AUTO-MCNC: NEGATIVE MG/DL
RBC # BLD AUTO: 3.72 X10(6)UL
RUBV IGG SER QL: NEGATIVE
RUBV IGG SER-ACNC: <0.2 IU/ML (ref 10–?)
SODIUM SERPL-SCNC: 134 MMOL/L (ref 136–145)
SP GR UR STRIP.AUTO: 1 (ref 1–1.03)
T4 FREE SERPL-MCNC: 1 NG/DL (ref 0.8–1.7)
THYROGLOB SERPL-MCNC: <15 U/ML (ref ?–60)
THYROPEROXIDASE AB SERPL-ACNC: <28 U/ML (ref ?–60)
TRIGL SERPL-MCNC: 106 MG/DL (ref 30–149)
TSI SER-ACNC: 4.54 MIU/ML (ref 0.36–3.74)
UROBILINOGEN UR STRIP.AUTO-MCNC: <2 MG/DL
VIT D+METAB SERPL-MCNC: 30.4 NG/ML (ref 30–100)
VLDLC SERPL CALC-MCNC: 16 MG/DL (ref 0–30)
WBC # BLD AUTO: 6.6 X10(3) UL (ref 4–11)

## 2022-11-09 PROCEDURE — 84443 ASSAY THYROID STIM HORMONE: CPT | Performed by: FAMILY MEDICINE

## 2022-11-09 PROCEDURE — 86762 RUBELLA ANTIBODY: CPT | Performed by: FAMILY MEDICINE

## 2022-11-09 PROCEDURE — 81001 URINALYSIS AUTO W/SCOPE: CPT | Performed by: FAMILY MEDICINE

## 2022-11-09 PROCEDURE — 86735 MUMPS ANTIBODY: CPT | Performed by: FAMILY MEDICINE

## 2022-11-09 PROCEDURE — 80061 LIPID PANEL: CPT | Performed by: FAMILY MEDICINE

## 2022-11-09 PROCEDURE — 86708 HEPATITIS A ANTIBODY: CPT | Performed by: FAMILY MEDICINE

## 2022-11-09 PROCEDURE — 80053 COMPREHEN METABOLIC PANEL: CPT | Performed by: FAMILY MEDICINE

## 2022-11-09 PROCEDURE — 3078F DIAST BP <80 MM HG: CPT | Performed by: FAMILY MEDICINE

## 2022-11-09 PROCEDURE — 86376 MICROSOMAL ANTIBODY EACH: CPT | Performed by: FAMILY MEDICINE

## 2022-11-09 PROCEDURE — 3008F BODY MASS INDEX DOCD: CPT | Performed by: FAMILY MEDICINE

## 2022-11-09 PROCEDURE — 90750 HZV VACC RECOMBINANT IM: CPT | Performed by: FAMILY MEDICINE

## 2022-11-09 PROCEDURE — 86765 RUBEOLA ANTIBODY: CPT | Performed by: FAMILY MEDICINE

## 2022-11-09 PROCEDURE — 82306 VITAMIN D 25 HYDROXY: CPT | Performed by: FAMILY MEDICINE

## 2022-11-09 PROCEDURE — 90472 IMMUNIZATION ADMIN EACH ADD: CPT | Performed by: FAMILY MEDICINE

## 2022-11-09 PROCEDURE — 86803 HEPATITIS C AB TEST: CPT | Performed by: FAMILY MEDICINE

## 2022-11-09 PROCEDURE — 99386 PREV VISIT NEW AGE 40-64: CPT | Performed by: FAMILY MEDICINE

## 2022-11-09 PROCEDURE — 90677 PCV20 VACCINE IM: CPT | Performed by: FAMILY MEDICINE

## 2022-11-09 PROCEDURE — 3074F SYST BP LT 130 MM HG: CPT | Performed by: FAMILY MEDICINE

## 2022-11-09 PROCEDURE — 86706 HEP B SURFACE ANTIBODY: CPT | Performed by: FAMILY MEDICINE

## 2022-11-09 PROCEDURE — 84439 ASSAY OF FREE THYROXINE: CPT | Performed by: FAMILY MEDICINE

## 2022-11-09 PROCEDURE — 90715 TDAP VACCINE 7 YRS/> IM: CPT | Performed by: FAMILY MEDICINE

## 2022-11-09 PROCEDURE — 86800 THYROGLOBULIN ANTIBODY: CPT | Performed by: FAMILY MEDICINE

## 2022-11-09 PROCEDURE — 85025 COMPLETE CBC W/AUTO DIFF WBC: CPT | Performed by: FAMILY MEDICINE

## 2022-11-09 PROCEDURE — 90471 IMMUNIZATION ADMIN: CPT | Performed by: FAMILY MEDICINE

## 2022-11-11 LAB
MEV IGG SER-ACNC: >300 AU/ML (ref 16.5–?)
MUV IGG SER IA-ACNC: <5 AU/ML (ref 11–?)

## 2022-11-15 ENCOUNTER — LAB ENCOUNTER (OUTPATIENT)
Dept: LAB | Facility: HOSPITAL | Age: 62
End: 2022-11-15
Attending: INTERNAL MEDICINE
Payer: COMMERCIAL

## 2022-11-15 ENCOUNTER — TELEPHONE (OUTPATIENT)
Dept: FAMILY MEDICINE CLINIC | Facility: CLINIC | Age: 62
End: 2022-11-15

## 2022-11-15 DIAGNOSIS — Z01.818 PRE-OP TESTING: ICD-10-CM

## 2022-11-15 DIAGNOSIS — E87.1 HYPONATREMIA: ICD-10-CM

## 2022-11-15 DIAGNOSIS — R79.89 ABNORMAL THYROID BLOOD TEST: Primary | ICD-10-CM

## 2022-11-15 DIAGNOSIS — R31.9 HEMATURIA, UNSPECIFIED TYPE: ICD-10-CM

## 2022-11-15 RX ORDER — LEVOTHYROXINE SODIUM 0.03 MG/1
25 TABLET ORAL
Qty: 60 TABLET | Refills: 1 | Status: SHIPPED | OUTPATIENT
Start: 2022-11-15

## 2022-11-16 LAB — SARS-COV-2 RNA RESP QL NAA+PROBE: NOT DETECTED

## 2022-11-18 PROBLEM — K50.00 CROHN'S DISEASE OF SMALL INTESTINE (HCC): Status: ACTIVE | Noted: 2022-11-18

## 2022-11-18 PROBLEM — D50.9 IRON DEFICIENCY ANEMIA, UNSPECIFIED: Status: ACTIVE | Noted: 2022-11-18

## 2022-11-28 ENCOUNTER — LAB ENCOUNTER (OUTPATIENT)
Dept: LAB | Age: 62
End: 2022-11-28
Attending: INTERNAL MEDICINE
Payer: COMMERCIAL

## 2022-11-28 DIAGNOSIS — K50.019 CROHN'S DISEASE OF SMALL INTESTINE WITH COMPLICATION (HCC): ICD-10-CM

## 2022-11-28 PROCEDURE — 83993 ASSAY FOR CALPROTECTIN FECAL: CPT

## 2022-12-01 LAB — CALPROTECTIN STL-MCNT: 39.9 ΜG/G (ref ?–50)

## 2022-12-16 ENCOUNTER — HOSPITAL ENCOUNTER (OUTPATIENT)
Dept: MAMMOGRAPHY | Age: 62
Discharge: HOME | End: 2022-12-16
Attending: FAMILY MEDICINE
Payer: COMMERCIAL

## 2022-12-16 ENCOUNTER — LAB ENCOUNTER (OUTPATIENT)
Dept: LAB | Age: 62
End: 2022-12-16
Attending: INTERNAL MEDICINE
Payer: COMMERCIAL

## 2022-12-16 ENCOUNTER — HOSPITAL ENCOUNTER (OUTPATIENT)
Dept: BONE DENSITY | Age: 62
Discharge: HOME | End: 2022-12-16
Attending: FAMILY MEDICINE
Payer: COMMERCIAL

## 2022-12-16 DIAGNOSIS — Z13.820 SCREENING FOR OSTEOPOROSIS: ICD-10-CM

## 2022-12-16 DIAGNOSIS — Z12.31 ENCOUNTER FOR SCREENING MAMMOGRAM FOR MALIGNANT NEOPLASM OF BREAST: ICD-10-CM

## 2022-12-16 DIAGNOSIS — K50.019 CROHN'S DISEASE OF SMALL INTESTINE WITH COMPLICATION (HCC): ICD-10-CM

## 2022-12-16 DIAGNOSIS — K50.10 CROHN'S DISEASE OF COLON WITHOUT COMPLICATION (HCC): ICD-10-CM

## 2022-12-16 PROCEDURE — 36415 COLL VENOUS BLD VENIPUNCTURE: CPT

## 2022-12-16 PROCEDURE — 77080 DXA BONE DENSITY AXIAL: CPT | Performed by: FAMILY MEDICINE

## 2022-12-16 PROCEDURE — 82397 CHEMILUMINESCENT ASSAY: CPT

## 2022-12-16 PROCEDURE — 77067 SCR MAMMO BI INCL CAD: CPT | Performed by: FAMILY MEDICINE

## 2022-12-16 PROCEDURE — 80230 DRUG ASSAY INFLIXIMAB: CPT

## 2022-12-16 PROCEDURE — 77063 BREAST TOMOSYNTHESIS BI: CPT | Performed by: FAMILY MEDICINE

## 2022-12-16 NOTE — PROGRESS NOTES
Dear Crystal Dennison,    Your bone density/dexa shows mild osteopenia in your back. Focus on weight bearing exercises, adequate vitamin D and calcium intake. I recommend repeating your dexa in 2 years.     Sincerely,  Dr. Hailey Borrero

## 2022-12-19 LAB
INFLIXIMAB ACTIVITY: 12.51 UG/ML
INFLIXIMAB NEUTRALIZING AB TTR: NOT DETECTED

## 2022-12-19 NOTE — PROGRESS NOTES
Results reviewed. Released to [address] Infliximab levels normal  Plan is to switch to Stelara given ongoing endoscopic activity despite adequate levels.   Please start insurance approval  RN Triage, please let pt know of lory          Notify if not viewed                            Copy from Result Note              Gabriella Chávez message as done              Gabriella Rivas MD

## 2023-01-31 ENCOUNTER — LAB ENCOUNTER (OUTPATIENT)
Dept: LAB | Age: 63
End: 2023-01-31
Attending: FAMILY MEDICINE
Payer: COMMERCIAL

## 2023-01-31 ENCOUNTER — LAB ENCOUNTER (OUTPATIENT)
Dept: LAB | Age: 63
End: 2023-01-31
Attending: NURSE PRACTITIONER
Payer: COMMERCIAL

## 2023-01-31 DIAGNOSIS — R31.9 HEMATURIA, UNSPECIFIED TYPE: ICD-10-CM

## 2023-01-31 DIAGNOSIS — E87.1 HYPONATREMIA: ICD-10-CM

## 2023-01-31 DIAGNOSIS — R79.89 ABNORMAL THYROID BLOOD TEST: ICD-10-CM

## 2023-01-31 DIAGNOSIS — K50.10 CROHN'S DISEASE OF COLON WITHOUT COMPLICATION (HCC): ICD-10-CM

## 2023-01-31 LAB
ALBUMIN SERPL-MCNC: 3.7 G/DL (ref 3.4–5)
ALBUMIN/GLOB SERPL: 0.8 {RATIO} (ref 1–2)
ALP LIVER SERPL-CCNC: 64 U/L
ALT SERPL-CCNC: 24 U/L
ANION GAP SERPL CALC-SCNC: 3 MMOL/L (ref 0–18)
AST SERPL-CCNC: 26 U/L (ref 15–37)
BASOPHILS # BLD AUTO: 0.06 X10(3) UL (ref 0–0.2)
BASOPHILS NFR BLD AUTO: 1 %
BILIRUB SERPL-MCNC: 0.6 MG/DL (ref 0.1–2)
BILIRUB UR QL CFM: NEGATIVE
BUN BLD-MCNC: 7 MG/DL (ref 7–18)
CALCIUM BLD-MCNC: 9.7 MG/DL (ref 8.5–10.1)
CHLORIDE SERPL-SCNC: 105 MMOL/L (ref 98–112)
CLARITY UR REFRACT.AUTO: CLEAR
CO2 SERPL-SCNC: 26 MMOL/L (ref 21–32)
CREAT BLD-MCNC: 0.71 MG/DL
CRP SERPL-MCNC: <0.29 MG/DL (ref ?–0.3)
EOSINOPHIL # BLD AUTO: 0.18 X10(3) UL (ref 0–0.7)
EOSINOPHIL NFR BLD AUTO: 3.1 %
ERYTHROCYTE [DISTWIDTH] IN BLOOD BY AUTOMATED COUNT: 12.9 %
FASTING STATUS PATIENT QL REPORTED: YES
GFR SERPLBLD BASED ON 1.73 SQ M-ARVRAT: 96 ML/MIN/1.73M2 (ref 60–?)
GLOBULIN PLAS-MCNC: 4.4 G/DL (ref 2.8–4.4)
GLUCOSE BLD-MCNC: 86 MG/DL (ref 70–99)
GLUCOSE UR STRIP.AUTO-MCNC: NEGATIVE MG/DL
HCT VFR BLD AUTO: 40.1 %
HGB BLD-MCNC: 12.8 G/DL
HYALINE CASTS #/AREA URNS AUTO: PRESENT /LPF
IMM GRANULOCYTES # BLD AUTO: 0.01 X10(3) UL (ref 0–1)
IMM GRANULOCYTES NFR BLD: 0.2 %
LEUKOCYTE ESTERASE UR QL STRIP.AUTO: NEGATIVE
LYMPHOCYTES # BLD AUTO: 1.19 X10(3) UL (ref 1–4)
LYMPHOCYTES NFR BLD AUTO: 20.6 %
MCH RBC QN AUTO: 31 PG (ref 26–34)
MCHC RBC AUTO-ENTMCNC: 31.9 G/DL (ref 31–37)
MCV RBC AUTO: 97.1 FL
MONOCYTES # BLD AUTO: 0.56 X10(3) UL (ref 0.1–1)
MONOCYTES NFR BLD AUTO: 9.7 %
NEUTROPHILS # BLD AUTO: 3.79 X10 (3) UL (ref 1.5–7.7)
NEUTROPHILS # BLD AUTO: 3.79 X10(3) UL (ref 1.5–7.7)
NEUTROPHILS NFR BLD AUTO: 65.4 %
NITRITE UR QL STRIP.AUTO: NEGATIVE
OSMOLALITY SERPL CALC.SUM OF ELEC: 275 MOSM/KG (ref 275–295)
PH UR STRIP.AUTO: 5.5 [PH] (ref 5–8)
PLATELET # BLD AUTO: 299 10(3)UL (ref 150–450)
POTASSIUM SERPL-SCNC: 3.9 MMOL/L (ref 3.5–5.1)
PROT SERPL-MCNC: 8.1 G/DL (ref 6.4–8.2)
RBC # BLD AUTO: 4.13 X10(6)UL
RBC #/AREA URNS AUTO: >10 /HPF
SODIUM SERPL-SCNC: 134 MMOL/L (ref 136–145)
SP GR UR STRIP.AUTO: 1.02 (ref 1–1.03)
T4 FREE SERPL-MCNC: 1 NG/DL (ref 0.8–1.7)
TSI SER-ACNC: 5.11 MIU/ML (ref 0.36–3.74)
UROBILINOGEN UR STRIP.AUTO-MCNC: 0.2 MG/DL
WBC # BLD AUTO: 5.8 X10(3) UL (ref 4–11)

## 2023-01-31 PROCEDURE — 86140 C-REACTIVE PROTEIN: CPT

## 2023-01-31 PROCEDURE — 81015 MICROSCOPIC EXAM OF URINE: CPT

## 2023-01-31 PROCEDURE — 81001 URINALYSIS AUTO W/SCOPE: CPT

## 2023-01-31 PROCEDURE — 84439 ASSAY OF FREE THYROXINE: CPT

## 2023-01-31 PROCEDURE — 80053 COMPREHEN METABOLIC PANEL: CPT

## 2023-01-31 PROCEDURE — 36415 COLL VENOUS BLD VENIPUNCTURE: CPT

## 2023-01-31 PROCEDURE — 85025 COMPLETE CBC W/AUTO DIFF WBC: CPT

## 2023-01-31 PROCEDURE — 84443 ASSAY THYROID STIM HORMONE: CPT

## 2023-02-02 DIAGNOSIS — R79.89 ABNORMAL THYROID BLOOD TEST: Primary | ICD-10-CM

## 2023-02-02 DIAGNOSIS — R82.90 ABNORMAL URINALYSIS: ICD-10-CM

## 2023-02-02 RX ORDER — LEVOTHYROXINE SODIUM 0.03 MG/1
25 TABLET ORAL
Qty: 90 TABLET | Refills: 0 | Status: SHIPPED | OUTPATIENT
Start: 2023-02-02

## 2023-02-03 ENCOUNTER — TELEPHONE (OUTPATIENT)
Dept: FAMILY MEDICINE CLINIC | Facility: CLINIC | Age: 63
End: 2023-02-03

## 2023-02-03 DIAGNOSIS — E03.9 HYPOTHYROIDISM, UNSPECIFIED TYPE: Primary | ICD-10-CM

## 2023-02-03 DIAGNOSIS — R31.9 HEMATURIA, UNSPECIFIED TYPE: ICD-10-CM

## 2023-02-03 NOTE — TELEPHONE ENCOUNTER
Beulah Mesa is calling because she was charged for her Vit D her [facility] told her to call the office and have provider to go on the provider portal, for specific codes that will cover her Vit D or she needs to have approriate codes to determine appropriate reason for the Vit D to be drawn.  Please call Beulah Mesa at [phone] with any questions

## 2023-02-08 ENCOUNTER — TELEPHONE (OUTPATIENT)
Dept: FAMILY MEDICINE CLINIC | Facility: CLINIC | Age: 63
End: 2023-02-08

## 2023-02-08 NOTE — TELEPHONE ENCOUNTER
Sent additional diagnosis codes to patient via "[employer] so that Vitamin D can be covered. I tried to call but was on hold for 20 minutes,.

## 2023-02-11 ENCOUNTER — TELEPHONE (OUTPATIENT)
Dept: FAMILY MEDICINE CLINIC | Facility: CLINIC | Age: 63
End: 2023-02-11

## 2023-02-12 NOTE — TELEPHONE ENCOUNTER
Sent an update [employer] message to patient verifying it is Vitamin D not B and gave her codes to call her insurance with. Patient did understand.

## 2023-02-13 ENCOUNTER — PATIENT MESSAGE (OUTPATIENT)
Dept: FAMILY MEDICINE CLINIC | Facility: CLINIC | Age: 63
End: 2023-02-13

## 2023-02-13 NOTE — TELEPHONE ENCOUNTER
Sent to billing to resubmit lab orders with new diagnosis codes. Also sent a message to patient that we are resubmitting the bill with new diagnosis codes.

## 2023-03-24 ENCOUNTER — LAB ENCOUNTER (OUTPATIENT)
Dept: LAB | Age: 63
End: 2023-03-24
Attending: FAMILY MEDICINE
Payer: COMMERCIAL

## 2023-03-24 DIAGNOSIS — R79.89 ABNORMAL THYROID BLOOD TEST: ICD-10-CM

## 2023-03-24 DIAGNOSIS — R31.9 HEMATURIA, UNSPECIFIED TYPE: ICD-10-CM

## 2023-03-24 DIAGNOSIS — R82.90 ABNORMAL URINALYSIS: ICD-10-CM

## 2023-03-24 DIAGNOSIS — E03.9 HYPOTHYROIDISM, UNSPECIFIED TYPE: ICD-10-CM

## 2023-03-24 LAB
BILIRUB UR QL STRIP.AUTO: NEGATIVE
CLARITY UR REFRACT.AUTO: CLEAR
COLOR UR AUTO: YELLOW
GLUCOSE UR STRIP.AUTO-MCNC: NEGATIVE MG/DL
HYALINE CASTS #/AREA URNS AUTO: PRESENT /LPF
KETONES UR STRIP.AUTO-MCNC: NEGATIVE MG/DL
NITRITE UR QL STRIP.AUTO: NEGATIVE
PH UR STRIP.AUTO: 7 [PH] (ref 5–8)
PROT UR STRIP.AUTO-MCNC: NEGATIVE MG/DL
RBC UR QL AUTO: NEGATIVE
SP GR UR STRIP.AUTO: 1.01 (ref 1–1.03)
T4 FREE SERPL-MCNC: 1 NG/DL (ref 0.8–1.7)
TSI SER-ACNC: 2.55 MIU/ML (ref 0.36–3.74)
UROBILINOGEN UR STRIP.AUTO-MCNC: <2 MG/DL

## 2023-03-24 PROCEDURE — 84439 ASSAY OF FREE THYROXINE: CPT

## 2023-03-24 PROCEDURE — 84443 ASSAY THYROID STIM HORMONE: CPT

## 2023-03-24 PROCEDURE — 81001 URINALYSIS AUTO W/SCOPE: CPT

## 2023-03-24 PROCEDURE — 36415 COLL VENOUS BLD VENIPUNCTURE: CPT

## 2023-04-14 RX ORDER — AZATHIOPRINE 50 MG/1
50 TABLET ORAL
Qty: 90 TABLET | Refills: 3 | Status: SHIPPED | OUTPATIENT
Start: 2023-04-14

## 2023-04-14 NOTE — TELEPHONE ENCOUNTER
Per last OV from 02/01/2023:    1. Since LUQ pain is improving then will hold off with CT abdomen. Recommended to have a trial of Omeprazole 20 mg po daily. Medication indication, side effects and alternatives discussed. Patient verbalized understanding. 2.  Continue Azathioprine 50 mg daily. Medication indication, side effects and alternatives discussed. Patient verbalized understanding. 3.  Patient is due for her first Stelara injection on 3/13/2023. Advised patient to call our office once she obtained her medication so we could  set her up for a Nurse visit for teaching. 4.  Repeat CBC and CMP in 6 months while on Azathioprine. Will also add QuantiFERON TB gold. 5.  Recommended to discuss with PCP regarding hepatitis B vaccine series. 6.  Continue yearly flu vaccine. 7.  Recommended to continue yearly dermatology and ophthalmology visits. 8.  Repeat colonoscopy in 6 months to 1 year to reassess response to Stelara. 9.  Recommended to call our office if there is recurrence of LUQ pain then would pursue CT Abdomen. 10.  Follow-up office visit in 3 months.

## 2023-05-08 PROBLEM — E03.9 ACQUIRED HYPOTHYROIDISM: Status: ACTIVE | Noted: 2023-05-08

## 2023-05-08 PROBLEM — I83.813 VARICOSE VEINS OF BOTH LOWER EXTREMITIES WITH PAIN: Status: ACTIVE | Noted: 2023-02-28

## 2023-05-08 RX ORDER — DIAZEPAM 10 MG/1
TABLET ORAL
COMMUNITY
Start: 2023-04-07

## 2023-05-09 ENCOUNTER — OFFICE VISIT (OUTPATIENT)
Dept: FAMILY MEDICINE CLINIC | Facility: CLINIC | Age: 63
End: 2023-05-09
Payer: COMMERCIAL

## 2023-05-09 VITALS
DIASTOLIC BLOOD PRESSURE: 72 MMHG | BODY MASS INDEX: 26.52 KG/M2 | SYSTOLIC BLOOD PRESSURE: 116 MMHG | OXYGEN SATURATION: 100 % | HEART RATE: 68 BPM | WEIGHT: 165 LBS | HEIGHT: 66 IN | RESPIRATION RATE: 16 BRPM

## 2023-05-09 DIAGNOSIS — D50.9 IRON DEFICIENCY ANEMIA, UNSPECIFIED IRON DEFICIENCY ANEMIA TYPE: ICD-10-CM

## 2023-05-09 DIAGNOSIS — Z23 NEED FOR VACCINATION: ICD-10-CM

## 2023-05-09 DIAGNOSIS — E03.9 ACQUIRED HYPOTHYROIDISM: Primary | ICD-10-CM

## 2023-05-09 DIAGNOSIS — Z79.899 MEDICATION MANAGEMENT: ICD-10-CM

## 2023-05-09 DIAGNOSIS — K50.919 CROHN'S DISEASE WITH COMPLICATION, UNSPECIFIED GASTROINTESTINAL TRACT LOCATION (HCC): ICD-10-CM

## 2023-05-09 DIAGNOSIS — I83.813 PAIN DUE TO VARICOSE VEINS OF BOTH LOWER EXTREMITIES: ICD-10-CM

## 2023-05-09 DIAGNOSIS — G43.009 MIGRAINE WITHOUT AURA AND WITHOUT STATUS MIGRAINOSUS, NOT INTRACTABLE: ICD-10-CM

## 2023-05-09 DIAGNOSIS — Z71.85 VACCINE COUNSELING: ICD-10-CM

## 2023-05-09 DIAGNOSIS — K21.9 GASTROESOPHAGEAL REFLUX DISEASE, UNSPECIFIED WHETHER ESOPHAGITIS PRESENT: ICD-10-CM

## 2023-05-09 DIAGNOSIS — E66.3 OVERWEIGHT (BMI 25.0-29.9): ICD-10-CM

## 2023-05-09 PROCEDURE — 3074F SYST BP LT 130 MM HG: CPT | Performed by: FAMILY MEDICINE

## 2023-05-09 PROCEDURE — 3008F BODY MASS INDEX DOCD: CPT | Performed by: FAMILY MEDICINE

## 2023-05-09 PROCEDURE — 90746 HEPB VACCINE 3 DOSE ADULT IM: CPT | Performed by: FAMILY MEDICINE

## 2023-05-09 PROCEDURE — 90472 IMMUNIZATION ADMIN EACH ADD: CPT | Performed by: FAMILY MEDICINE

## 2023-05-09 PROCEDURE — 90707 MMR VACCINE SC: CPT | Performed by: FAMILY MEDICINE

## 2023-05-09 PROCEDURE — 90471 IMMUNIZATION ADMIN: CPT | Performed by: FAMILY MEDICINE

## 2023-05-09 PROCEDURE — 90632 HEPA VACCINE ADULT IM: CPT | Performed by: FAMILY MEDICINE

## 2023-05-09 PROCEDURE — 99214 OFFICE O/P EST MOD 30 MIN: CPT | Performed by: FAMILY MEDICINE

## 2023-05-09 PROCEDURE — 90750 HZV VACC RECOMBINANT IM: CPT | Performed by: FAMILY MEDICINE

## 2023-05-09 PROCEDURE — 3078F DIAST BP <80 MM HG: CPT | Performed by: FAMILY MEDICINE

## 2023-05-11 ENCOUNTER — PATIENT MESSAGE (OUTPATIENT)
Dept: FAMILY MEDICINE CLINIC | Facility: CLINIC | Age: 63
End: 2023-05-11

## 2023-05-11 DIAGNOSIS — K50.80 CROHN'S DISEASE OF BOTH SMALL AND LARGE INTESTINE WITHOUT COMPLICATION (HCC): Primary | ICD-10-CM

## 2023-05-12 NOTE — TELEPHONE ENCOUNTER
Sent pt. A [facility] message with information on Leonard Castillo and [employer]. Derm order placed for pt. To have 3 visits.

## 2023-05-12 NOTE — TELEPHONE ENCOUNTER
Dr. Rodriguez Phoenix,    [address] [place] [address] 5-9-23 NOV [phone]    See "[employer] message. Thank you.

## 2023-05-15 DIAGNOSIS — R79.89 ABNORMAL THYROID BLOOD TEST: ICD-10-CM

## 2023-05-16 RX ORDER — LEVOTHYROXINE SODIUM 0.03 MG/1
25 TABLET ORAL
Qty: 90 TABLET | Refills: 1 | Status: SHIPPED | OUTPATIENT
Start: 2023-05-16

## 2023-06-12 ENCOUNTER — NURSE ONLY (OUTPATIENT)
Dept: FAMILY MEDICINE CLINIC | Facility: CLINIC | Age: 63
End: 2023-06-12
Payer: COMMERCIAL

## 2023-06-12 DIAGNOSIS — Z23 NEED FOR DTP, HIB CONJUGATE AND HEPATITIS B VACCINE: Primary | ICD-10-CM

## 2023-06-12 DIAGNOSIS — Z23 NEED FOR PROPHYLACTIC VACCINATION AGAINST HEPATITIS B VIRUS: ICD-10-CM

## 2023-06-12 PROCEDURE — 90746 HEPB VACCINE 3 DOSE ADULT IM: CPT | Performed by: FAMILY MEDICINE

## 2023-06-12 PROCEDURE — 90471 IMMUNIZATION ADMIN: CPT | Performed by: FAMILY MEDICINE

## 2023-06-12 NOTE — PROGRESS NOTES
Radha Lama presents for her 2nd Hep B vaccine. VIS given. Vaccine given to left deltoid. Tolerated injection without incident.

## 2023-08-09 ENCOUNTER — MED REC SCAN ONLY (OUTPATIENT)
Dept: FAMILY MEDICINE CLINIC | Facility: CLINIC | Age: 63
End: 2023-08-09

## 2023-08-15 ENCOUNTER — LAB ENCOUNTER (OUTPATIENT)
Dept: LAB | Age: 63
End: 2023-08-15
Attending: FAMILY MEDICINE
Payer: COMMERCIAL

## 2023-08-15 DIAGNOSIS — K50.00 CROHN'S DISEASE OF SMALL INTESTINE WITHOUT COMPLICATION (HCC): ICD-10-CM

## 2023-08-15 LAB
ALBUMIN SERPL-MCNC: 3.4 G/DL (ref 3.4–5)
ALBUMIN/GLOB SERPL: 0.8 {RATIO} (ref 1–2)
ALP LIVER SERPL-CCNC: 70 U/L
ALT SERPL-CCNC: 25 U/L
ANION GAP SERPL CALC-SCNC: 5 MMOL/L (ref 0–18)
AST SERPL-CCNC: 21 U/L (ref 15–37)
BASOPHILS # BLD AUTO: 0.05 X10(3) UL (ref 0–0.2)
BASOPHILS NFR BLD AUTO: 1 %
BILIRUB SERPL-MCNC: 0.6 MG/DL (ref 0.1–2)
BUN BLD-MCNC: 7 MG/DL (ref 7–18)
CALCIUM BLD-MCNC: 8.9 MG/DL (ref 8.5–10.1)
CHLORIDE SERPL-SCNC: 104 MMOL/L (ref 98–112)
CO2 SERPL-SCNC: 26 MMOL/L (ref 21–32)
CREAT BLD-MCNC: 0.61 MG/DL
EGFRCR SERPLBLD CKD-EPI 2021: 101 ML/MIN/1.73M2 (ref 60–?)
EOSINOPHIL # BLD AUTO: 0.18 X10(3) UL (ref 0–0.7)
EOSINOPHIL NFR BLD AUTO: 3.4 %
ERYTHROCYTE [DISTWIDTH] IN BLOOD BY AUTOMATED COUNT: 13.5 %
FASTING STATUS PATIENT QL REPORTED: YES
GLOBULIN PLAS-MCNC: 4.3 G/DL (ref 2.8–4.4)
GLUCOSE BLD-MCNC: 80 MG/DL (ref 70–99)
HCT VFR BLD AUTO: 35.7 %
HGB BLD-MCNC: 11.7 G/DL
IMM GRANULOCYTES # BLD AUTO: 0.01 X10(3) UL (ref 0–1)
IMM GRANULOCYTES NFR BLD: 0.2 %
LYMPHOCYTES # BLD AUTO: 1.08 X10(3) UL (ref 1–4)
LYMPHOCYTES NFR BLD AUTO: 20.7 %
MCH RBC QN AUTO: 30.9 PG (ref 26–34)
MCHC RBC AUTO-ENTMCNC: 32.8 G/DL (ref 31–37)
MCV RBC AUTO: 94.2 FL
MONOCYTES # BLD AUTO: 0.45 X10(3) UL (ref 0.1–1)
MONOCYTES NFR BLD AUTO: 8.6 %
NEUTROPHILS # BLD AUTO: 3.46 X10 (3) UL (ref 1.5–7.7)
NEUTROPHILS # BLD AUTO: 3.46 X10(3) UL (ref 1.5–7.7)
NEUTROPHILS NFR BLD AUTO: 66.1 %
OSMOLALITY SERPL CALC.SUM OF ELEC: 277 MOSM/KG (ref 275–295)
PLATELET # BLD AUTO: 266 10(3)UL (ref 150–450)
POTASSIUM SERPL-SCNC: 4.2 MMOL/L (ref 3.5–5.1)
PROT SERPL-MCNC: 7.7 G/DL (ref 6.4–8.2)
RBC # BLD AUTO: 3.79 X10(6)UL
SODIUM SERPL-SCNC: 135 MMOL/L (ref 136–145)
WBC # BLD AUTO: 5.2 X10(3) UL (ref 4–11)

## 2023-08-15 PROCEDURE — 85025 COMPLETE CBC W/AUTO DIFF WBC: CPT

## 2023-08-15 PROCEDURE — 86480 TB TEST CELL IMMUN MEASURE: CPT

## 2023-08-15 PROCEDURE — 80053 COMPREHEN METABOLIC PANEL: CPT

## 2023-08-15 PROCEDURE — 36415 COLL VENOUS BLD VENIPUNCTURE: CPT

## 2023-08-17 LAB
M TB IFN-G CD4+ T-CELLS BLD-ACNC: 0.02 IU/ML
M TB TUBERC IFN-G BLD QL: NEGATIVE
M TB TUBERC IGNF/MITOGEN IGNF CONTROL: 3.46 IU/ML
QFT TB1 AG MINUS NIL: 0 IU/ML
QFT TB2 AG MINUS NIL: 0.01 IU/ML

## 2023-09-01 ENCOUNTER — LAB ENCOUNTER (OUTPATIENT)
Dept: LAB | Age: 63
End: 2023-09-01
Attending: NURSE PRACTITIONER
Payer: COMMERCIAL

## 2023-09-01 DIAGNOSIS — D64.9 ANEMIA, UNSPECIFIED TYPE: ICD-10-CM

## 2023-09-01 LAB
DEPRECATED HBV CORE AB SER IA-ACNC: 21.4 NG/ML
FOLATE SERPL-MCNC: 19 NG/ML (ref 8.7–?)
IRON SATN MFR SERPL: 22 %
IRON SERPL-MCNC: 92 UG/DL
TIBC SERPL-MCNC: 423 UG/DL (ref 240–450)
TRANSFERRIN SERPL-MCNC: 284 MG/DL (ref 200–360)
VIT B12 SERPL-MCNC: 348 PG/ML (ref 193–986)

## 2023-09-01 PROCEDURE — 82728 ASSAY OF FERRITIN: CPT

## 2023-09-01 PROCEDURE — 82607 VITAMIN B-12: CPT

## 2023-09-01 PROCEDURE — 83550 IRON BINDING TEST: CPT

## 2023-09-01 PROCEDURE — 36415 COLL VENOUS BLD VENIPUNCTURE: CPT

## 2023-09-01 PROCEDURE — 82746 ASSAY OF FOLIC ACID SERUM: CPT

## 2023-09-01 PROCEDURE — 83540 ASSAY OF IRON: CPT

## 2023-09-07 ENCOUNTER — PATIENT MESSAGE (OUTPATIENT)
Dept: FAMILY MEDICINE CLINIC | Facility: CLINIC | Age: 63
End: 2023-09-07

## 2023-11-13 ENCOUNTER — OFFICE VISIT (OUTPATIENT)
Dept: FAMILY MEDICINE CLINIC | Facility: CLINIC | Age: 63
End: 2023-11-13
Payer: COMMERCIAL

## 2023-11-13 VITALS
RESPIRATION RATE: 16 BRPM | HEART RATE: 62 BPM | OXYGEN SATURATION: 100 % | WEIGHT: 167 LBS | BODY MASS INDEX: 26.84 KG/M2 | DIASTOLIC BLOOD PRESSURE: 72 MMHG | HEIGHT: 66 IN | SYSTOLIC BLOOD PRESSURE: 114 MMHG

## 2023-11-13 DIAGNOSIS — Z12.31 ENCOUNTER FOR SCREENING MAMMOGRAM FOR MALIGNANT NEOPLASM OF BREAST: Primary | ICD-10-CM

## 2023-11-13 DIAGNOSIS — Z00.00 LABORATORY EXAMINATION ORDERED AS PART OF A ROUTINE GENERAL MEDICAL EXAMINATION: ICD-10-CM

## 2023-11-13 DIAGNOSIS — K50.919 CROHN'S DISEASE WITH COMPLICATION, UNSPECIFIED GASTROINTESTINAL TRACT LOCATION (HCC): ICD-10-CM

## 2023-11-13 DIAGNOSIS — Z00.00 ROUTINE GENERAL MEDICAL EXAMINATION AT A HEALTH CARE FACILITY: ICD-10-CM

## 2023-11-13 DIAGNOSIS — Z13.89 SCREENING FOR GENITOURINARY CONDITION: ICD-10-CM

## 2023-11-13 DIAGNOSIS — Z12.4 SCREENING FOR MALIGNANT NEOPLASM OF CERVIX: ICD-10-CM

## 2023-11-13 DIAGNOSIS — E03.9 ACQUIRED HYPOTHYROIDISM: ICD-10-CM

## 2023-11-13 DIAGNOSIS — Z71.85 VACCINE COUNSELING: ICD-10-CM

## 2023-11-13 DIAGNOSIS — Z23 NEED FOR VACCINATION: ICD-10-CM

## 2023-11-13 LAB
BILIRUB UR QL STRIP.AUTO: NEGATIVE
CHOLEST SERPL-MCNC: 152 MG/DL (ref ?–200)
CLARITY UR REFRACT.AUTO: CLEAR
COLOR UR AUTO: YELLOW
FASTING PATIENT LIPID ANSWER: NO
GLUCOSE UR STRIP.AUTO-MCNC: NORMAL MG/DL
HDLC SERPL-MCNC: 71 MG/DL (ref 40–59)
KETONES UR STRIP.AUTO-MCNC: NEGATIVE MG/DL
LDLC SERPL CALC-MCNC: 63 MG/DL (ref ?–100)
LEUKOCYTE ESTERASE UR QL STRIP.AUTO: NEGATIVE
NITRITE UR QL STRIP.AUTO: NEGATIVE
NONHDLC SERPL-MCNC: 81 MG/DL (ref ?–130)
PH UR STRIP.AUTO: 6 [PH] (ref 5–8)
PROT UR STRIP.AUTO-MCNC: NEGATIVE MG/DL
RBC UR QL AUTO: NEGATIVE
SP GR UR STRIP.AUTO: 1.01 (ref 1–1.03)
T4 FREE SERPL-MCNC: 1.1 NG/DL (ref 0.8–1.7)
TRIGL SERPL-MCNC: 103 MG/DL (ref 30–149)
TSI SER-ACNC: 2.21 MIU/ML (ref 0.36–3.74)
UROBILINOGEN UR STRIP.AUTO-MCNC: NORMAL MG/DL
VIT D+METAB SERPL-MCNC: 26.5 NG/ML (ref 30–100)
VLDLC SERPL CALC-MCNC: 15 MG/DL (ref 0–30)

## 2023-11-13 PROCEDURE — 81003 URINALYSIS AUTO W/O SCOPE: CPT | Performed by: FAMILY MEDICINE

## 2023-11-13 PROCEDURE — 87624 HPV HI-RISK TYP POOLED RSLT: CPT | Performed by: FAMILY MEDICINE

## 2023-11-13 PROCEDURE — 88175 CYTOPATH C/V AUTO FLUID REDO: CPT | Performed by: FAMILY MEDICINE

## 2023-11-13 PROCEDURE — 80061 LIPID PANEL: CPT | Performed by: FAMILY MEDICINE

## 2023-11-13 PROCEDURE — 84439 ASSAY OF FREE THYROXINE: CPT | Performed by: FAMILY MEDICINE

## 2023-11-13 PROCEDURE — 84443 ASSAY THYROID STIM HORMONE: CPT | Performed by: FAMILY MEDICINE

## 2023-11-13 PROCEDURE — 82306 VITAMIN D 25 HYDROXY: CPT | Performed by: FAMILY MEDICINE

## 2023-11-14 DIAGNOSIS — E55.9 VITAMIN D DEFICIENCY: Primary | ICD-10-CM

## 2023-11-14 RX ORDER — GLUCOSAMINE HCL 500 MG
1 TABLET ORAL
COMMUNITY
Start: 2023-11-14

## 2023-11-19 ENCOUNTER — PATIENT MESSAGE (OUTPATIENT)
Dept: FAMILY MEDICINE CLINIC | Facility: CLINIC | Age: 63
End: 2023-11-19

## 2023-11-19 DIAGNOSIS — R79.89 ABNORMAL THYROID BLOOD TEST: ICD-10-CM

## 2023-11-20 ENCOUNTER — PATIENT MESSAGE (OUTPATIENT)
Dept: FAMILY MEDICINE CLINIC | Facility: CLINIC | Age: 63
End: 2023-11-20

## 2023-11-20 LAB
.: ABNORMAL
.: ABNORMAL
HPV I/H RISK 1 DNA SPEC QL NAA+PROBE: NEGATIVE

## 2023-11-20 RX ORDER — LEVOTHYROXINE SODIUM 0.03 MG/1
25 TABLET ORAL
Qty: 90 TABLET | Refills: 1 | Status: SHIPPED | OUTPATIENT
Start: 2023-11-20

## 2023-11-20 NOTE — TELEPHONE ENCOUNTER
Last office visit: 11/13/2023   Protocol: pass  Requested medication(s) are due for refill today: yes  Requested medication(s) are on the active medication list same strength, form, dose/ sig: yes  Requested medication(s) are managed by provider: yes  Patient has already received a courtsey refill: no

## 2023-12-19 ENCOUNTER — HOSPITAL ENCOUNTER (OUTPATIENT)
Dept: MAMMOGRAPHY | Age: 63
Discharge: HOME | End: 2023-12-19
Attending: FAMILY MEDICINE
Payer: COMMERCIAL

## 2023-12-19 DIAGNOSIS — Z12.31 ENCOUNTER FOR SCREENING MAMMOGRAM FOR MALIGNANT NEOPLASM OF BREAST: ICD-10-CM

## 2023-12-19 PROCEDURE — 77067 SCR MAMMO BI INCL CAD: CPT | Performed by: FAMILY MEDICINE

## 2023-12-19 PROCEDURE — 77063 BREAST TOMOSYNTHESIS BI: CPT | Performed by: FAMILY MEDICINE

## 2024-03-12 ENCOUNTER — LAB ENCOUNTER (OUTPATIENT)
Dept: LAB | Age: 64
End: 2024-03-12
Attending: NURSE PRACTITIONER
Payer: COMMERCIAL

## 2024-03-12 DIAGNOSIS — K50.00 CROHN'S DISEASE OF SMALL INTESTINE WITHOUT COMPLICATION (HCC): ICD-10-CM

## 2024-03-12 DIAGNOSIS — E55.9 VITAMIN D DEFICIENCY: ICD-10-CM

## 2024-03-12 LAB
ALBUMIN SERPL-MCNC: 3.7 G/DL (ref 3.4–5)
ALBUMIN/GLOB SERPL: 1 {RATIO} (ref 1–2)
ALP LIVER SERPL-CCNC: 68 U/L
ALT SERPL-CCNC: 26 U/L
ANION GAP SERPL CALC-SCNC: 4 MMOL/L (ref 0–18)
AST SERPL-CCNC: 19 U/L (ref 15–37)
BASOPHILS # BLD AUTO: 0.05 X10(3) UL (ref 0–0.2)
BASOPHILS NFR BLD AUTO: 1.1 %
BILIRUB SERPL-MCNC: 0.6 MG/DL (ref 0.1–2)
BUN BLD-MCNC: 6 MG/DL (ref 9–23)
CALCIUM BLD-MCNC: 9.4 MG/DL (ref 8.5–10.1)
CHLORIDE SERPL-SCNC: 106 MMOL/L (ref 98–112)
CO2 SERPL-SCNC: 26 MMOL/L (ref 21–32)
CREAT BLD-MCNC: 0.67 MG/DL
EGFRCR SERPLBLD CKD-EPI 2021: 98 ML/MIN/1.73M2 (ref 60–?)
EOSINOPHIL # BLD AUTO: 0.17 X10(3) UL (ref 0–0.7)
EOSINOPHIL NFR BLD AUTO: 3.6 %
ERYTHROCYTE [DISTWIDTH] IN BLOOD BY AUTOMATED COUNT: 12.4 %
FASTING STATUS PATIENT QL REPORTED: YES
GLOBULIN PLAS-MCNC: 3.7 G/DL (ref 2.8–4.4)
GLUCOSE BLD-MCNC: 81 MG/DL (ref 70–99)
HBV SURFACE AB SER QL: NONREACTIVE
HBV SURFACE AB SERPL IA-ACNC: <3.1 MIU/ML
HCT VFR BLD AUTO: 37 %
HGB BLD-MCNC: 12.1 G/DL
IMM GRANULOCYTES # BLD AUTO: 0 X10(3) UL (ref 0–1)
IMM GRANULOCYTES NFR BLD: 0 %
LYMPHOCYTES # BLD AUTO: 1.27 X10(3) UL (ref 1–4)
LYMPHOCYTES NFR BLD AUTO: 27 %
MCH RBC QN AUTO: 30.8 PG (ref 26–34)
MCHC RBC AUTO-ENTMCNC: 32.7 G/DL (ref 31–37)
MCV RBC AUTO: 94.1 FL
MONOCYTES # BLD AUTO: 0.37 X10(3) UL (ref 0.1–1)
MONOCYTES NFR BLD AUTO: 7.9 %
NEUTROPHILS # BLD AUTO: 2.84 X10 (3) UL (ref 1.5–7.7)
NEUTROPHILS # BLD AUTO: 2.84 X10(3) UL (ref 1.5–7.7)
NEUTROPHILS NFR BLD AUTO: 60.4 %
OSMOLALITY SERPL CALC.SUM OF ELEC: 279 MOSM/KG (ref 275–295)
PLATELET # BLD AUTO: 235 10(3)UL (ref 150–450)
POTASSIUM SERPL-SCNC: 4.5 MMOL/L (ref 3.5–5.1)
PROT SERPL-MCNC: 7.4 G/DL (ref 6.4–8.2)
RBC # BLD AUTO: 3.93 X10(6)UL
SODIUM SERPL-SCNC: 136 MMOL/L (ref 136–145)
VIT D+METAB SERPL-MCNC: 39 NG/ML (ref 30–100)
WBC # BLD AUTO: 4.7 X10(3) UL (ref 4–11)

## 2024-03-12 PROCEDURE — 36415 COLL VENOUS BLD VENIPUNCTURE: CPT

## 2024-03-12 PROCEDURE — 82306 VITAMIN D 25 HYDROXY: CPT

## 2024-03-12 PROCEDURE — 85025 COMPLETE CBC W/AUTO DIFF WBC: CPT

## 2024-03-12 PROCEDURE — 86706 HEP B SURFACE ANTIBODY: CPT

## 2024-03-12 PROCEDURE — 80053 COMPREHEN METABOLIC PANEL: CPT

## 2024-04-02 ENCOUNTER — PATIENT MESSAGE (OUTPATIENT)
Dept: FAMILY MEDICINE CLINIC | Facility: CLINIC | Age: 64
End: 2024-04-02

## 2024-04-03 NOTE — TELEPHONE ENCOUNTER
From: Alexa Wolf  To: Madai Stephens  Sent: 4/2/2024 2:03 PM CDT  Subject: Covid booster     Good afternoon!  Our community is offering covid boosters later this month. We were planning on getting them, but wanted to see if it’s recommended. I am 63 and have Crohn’s, and my  is 72. Thank you!

## 2024-05-19 DIAGNOSIS — R79.89 ABNORMAL THYROID BLOOD TEST: ICD-10-CM

## 2024-05-20 RX ORDER — LEVOTHYROXINE SODIUM 0.03 MG/1
25 TABLET ORAL
Qty: 90 TABLET | Refills: 1 | Status: SHIPPED | OUTPATIENT
Start: 2024-05-20

## 2024-05-20 NOTE — TELEPHONE ENCOUNTER
Last office visit: 11/13/23  Protocol: pass    Requested medication(s) are due for refill today: Yes    Requested medication(s) are on the active medication list same strength, form, dose/ sig: Yes    Requested medication(s) are managed by provider: Yes    Patient has already received a courtsey refill: No    NOV: 06/07/24

## 2024-06-06 RX ORDER — POLYETHYLENE GLYCOL-3350 AND ELECTROLYTES WITH FLAVOR PACK 240; 5.84; 2.98; 6.72; 22.72 G/278.26G; G/278.26G; G/278.26G; G/278.26G; G/278.26G
236 POWDER, FOR SOLUTION ORAL AS DIRECTED
COMMUNITY
Start: 2024-01-02 | End: 2024-06-07

## 2024-06-07 ENCOUNTER — OFFICE VISIT (OUTPATIENT)
Dept: FAMILY MEDICINE CLINIC | Facility: CLINIC | Age: 64
End: 2024-06-07
Payer: COMMERCIAL

## 2024-06-07 VITALS
RESPIRATION RATE: 16 BRPM | OXYGEN SATURATION: 100 % | SYSTOLIC BLOOD PRESSURE: 120 MMHG | HEIGHT: 66 IN | DIASTOLIC BLOOD PRESSURE: 72 MMHG | HEART RATE: 69 BPM | BODY MASS INDEX: 26.58 KG/M2 | WEIGHT: 165.38 LBS

## 2024-06-07 DIAGNOSIS — E66.3 OVERWEIGHT (BMI 25.0-29.9): ICD-10-CM

## 2024-06-07 DIAGNOSIS — Z71.85 VACCINE COUNSELING: ICD-10-CM

## 2024-06-07 DIAGNOSIS — E03.9 ACQUIRED HYPOTHYROIDISM: ICD-10-CM

## 2024-06-07 DIAGNOSIS — Z23 NEED FOR VACCINATION: ICD-10-CM

## 2024-06-07 DIAGNOSIS — Z79.899 MEDICATION MANAGEMENT: ICD-10-CM

## 2024-06-07 DIAGNOSIS — Z12.4 SCREENING FOR MALIGNANT NEOPLASM OF CERVIX: ICD-10-CM

## 2024-06-07 DIAGNOSIS — K50.80 CROHN'S DISEASE OF BOTH SMALL AND LARGE INTESTINE WITHOUT COMPLICATION (HCC): ICD-10-CM

## 2024-06-07 DIAGNOSIS — G43.009 MIGRAINE WITHOUT AURA AND WITHOUT STATUS MIGRAINOSUS, NOT INTRACTABLE: ICD-10-CM

## 2024-06-07 DIAGNOSIS — I83.813 PAIN DUE TO VARICOSE VEINS OF BOTH LOWER EXTREMITIES: ICD-10-CM

## 2024-06-07 DIAGNOSIS — Z12.31 ENCOUNTER FOR SCREENING MAMMOGRAM FOR MALIGNANT NEOPLASM OF BREAST: ICD-10-CM

## 2024-06-07 DIAGNOSIS — Z00.00 LABORATORY EXAMINATION ORDERED AS PART OF A ROUTINE GENERAL MEDICAL EXAMINATION: ICD-10-CM

## 2024-06-07 DIAGNOSIS — Z00.00 ROUTINE GENERAL MEDICAL EXAMINATION AT A HEALTH CARE FACILITY: Primary | ICD-10-CM

## 2024-06-07 DIAGNOSIS — D50.9 IRON DEFICIENCY ANEMIA, UNSPECIFIED IRON DEFICIENCY ANEMIA TYPE: ICD-10-CM

## 2024-06-07 DIAGNOSIS — Z13.820 SCREENING FOR OSTEOPOROSIS: ICD-10-CM

## 2024-06-07 DIAGNOSIS — Z13.89 SCREENING FOR GENITOURINARY CONDITION: ICD-10-CM

## 2024-06-07 DIAGNOSIS — K21.9 GASTROESOPHAGEAL REFLUX DISEASE, UNSPECIFIED WHETHER ESOPHAGITIS PRESENT: ICD-10-CM

## 2024-06-07 LAB
ALBUMIN SERPL-MCNC: 3.6 G/DL (ref 3.4–5)
ALBUMIN/GLOB SERPL: 0.9 {RATIO} (ref 1–2)
ALP LIVER SERPL-CCNC: 70 U/L
ALT SERPL-CCNC: 26 U/L
ANION GAP SERPL CALC-SCNC: 8 MMOL/L (ref 0–18)
AST SERPL-CCNC: 31 U/L (ref 15–37)
BASOPHILS # BLD AUTO: 0.06 X10(3) UL (ref 0–0.2)
BASOPHILS NFR BLD AUTO: 0.8 %
BILIRUB SERPL-MCNC: 0.5 MG/DL (ref 0.1–2)
BILIRUB UR QL STRIP.AUTO: NEGATIVE
BUN BLD-MCNC: 8 MG/DL (ref 9–23)
CALCIUM BLD-MCNC: 8.7 MG/DL (ref 8.5–10.1)
CHLORIDE SERPL-SCNC: 101 MMOL/L (ref 98–112)
CHOLEST SERPL-MCNC: 150 MG/DL (ref ?–200)
CLARITY UR REFRACT.AUTO: CLEAR
CO2 SERPL-SCNC: 24 MMOL/L (ref 21–32)
COLOR UR AUTO: YELLOW
CREAT BLD-MCNC: 0.74 MG/DL
EGFRCR SERPLBLD CKD-EPI 2021: 91 ML/MIN/1.73M2 (ref 60–?)
EOSINOPHIL # BLD AUTO: 0.12 X10(3) UL (ref 0–0.7)
EOSINOPHIL NFR BLD AUTO: 1.6 %
ERYTHROCYTE [DISTWIDTH] IN BLOOD BY AUTOMATED COUNT: 12.6 %
FASTING PATIENT LIPID ANSWER: NO
FASTING STATUS PATIENT QL REPORTED: NO
GLOBULIN PLAS-MCNC: 4.2 G/DL (ref 2.8–4.4)
GLUCOSE BLD-MCNC: 84 MG/DL (ref 70–99)
GLUCOSE UR STRIP.AUTO-MCNC: NORMAL MG/DL
HCT VFR BLD AUTO: 35.6 %
HDLC SERPL-MCNC: 70 MG/DL (ref 40–59)
HGB BLD-MCNC: 11.9 G/DL
IMM GRANULOCYTES # BLD AUTO: 0.02 X10(3) UL (ref 0–1)
IMM GRANULOCYTES NFR BLD: 0.3 %
KETONES UR STRIP.AUTO-MCNC: NEGATIVE MG/DL
LDLC SERPL CALC-MCNC: 65 MG/DL (ref ?–100)
LEUKOCYTE ESTERASE UR QL STRIP.AUTO: NEGATIVE
LYMPHOCYTES # BLD AUTO: 1.34 X10(3) UL (ref 1–4)
LYMPHOCYTES NFR BLD AUTO: 18.1 %
MCH RBC QN AUTO: 30.7 PG (ref 26–34)
MCHC RBC AUTO-ENTMCNC: 33.4 G/DL (ref 31–37)
MCV RBC AUTO: 91.8 FL
MONOCYTES # BLD AUTO: 0.62 X10(3) UL (ref 0.1–1)
MONOCYTES NFR BLD AUTO: 8.4 %
NEUTROPHILS # BLD AUTO: 5.24 X10 (3) UL (ref 1.5–7.7)
NEUTROPHILS # BLD AUTO: 5.24 X10(3) UL (ref 1.5–7.7)
NEUTROPHILS NFR BLD AUTO: 70.8 %
NITRITE UR QL STRIP.AUTO: NEGATIVE
NONHDLC SERPL-MCNC: 80 MG/DL (ref ?–130)
OSMOLALITY SERPL CALC.SUM OF ELEC: 274 MOSM/KG (ref 275–295)
PH UR STRIP.AUTO: 6.5 [PH] (ref 5–8)
PLATELET # BLD AUTO: 227 10(3)UL (ref 150–450)
POTASSIUM SERPL-SCNC: 4.3 MMOL/L (ref 3.5–5.1)
PROT SERPL-MCNC: 7.8 G/DL (ref 6.4–8.2)
PROT UR STRIP.AUTO-MCNC: NEGATIVE MG/DL
RBC # BLD AUTO: 3.88 X10(6)UL
RBC UR QL AUTO: NEGATIVE
SODIUM SERPL-SCNC: 133 MMOL/L (ref 136–145)
SP GR UR STRIP.AUTO: 1.01 (ref 1–1.03)
T4 FREE SERPL-MCNC: 0.9 NG/DL (ref 0.8–1.7)
TRIGL SERPL-MCNC: 78 MG/DL (ref 30–149)
TSI SER-ACNC: 2.33 MIU/ML (ref 0.36–3.74)
UROBILINOGEN UR STRIP.AUTO-MCNC: NORMAL MG/DL
VLDLC SERPL CALC-MCNC: 12 MG/DL (ref 0–30)
WBC # BLD AUTO: 7.4 X10(3) UL (ref 4–11)

## 2024-06-07 PROCEDURE — 81003 URINALYSIS AUTO W/O SCOPE: CPT | Performed by: FAMILY MEDICINE

## 2024-06-07 PROCEDURE — 90746 HEPB VACCINE 3 DOSE ADULT IM: CPT | Performed by: FAMILY MEDICINE

## 2024-06-07 PROCEDURE — 84439 ASSAY OF FREE THYROXINE: CPT | Performed by: FAMILY MEDICINE

## 2024-06-07 PROCEDURE — 90471 IMMUNIZATION ADMIN: CPT | Performed by: FAMILY MEDICINE

## 2024-06-07 PROCEDURE — 85025 COMPLETE CBC W/AUTO DIFF WBC: CPT | Performed by: FAMILY MEDICINE

## 2024-06-07 PROCEDURE — 80061 LIPID PANEL: CPT | Performed by: FAMILY MEDICINE

## 2024-06-07 PROCEDURE — 80053 COMPREHEN METABOLIC PANEL: CPT | Performed by: FAMILY MEDICINE

## 2024-06-07 PROCEDURE — 88175 CYTOPATH C/V AUTO FLUID REDO: CPT | Performed by: FAMILY MEDICINE

## 2024-06-07 PROCEDURE — 84443 ASSAY THYROID STIM HORMONE: CPT | Performed by: FAMILY MEDICINE

## 2024-06-07 PROCEDURE — 99396 PREV VISIT EST AGE 40-64: CPT | Performed by: FAMILY MEDICINE

## 2024-06-07 NOTE — H&P
CC: Annual Physical Exam    HPI:   Alexa Wolf is a 63 year old female who presents for a complete physical exam. Symptoms: is menopausal. Patient complains of nothing and Hx of Crohn's.    Wt Readings from Last 6 Encounters:   06/07/24 165 lb 6 oz (75 kg)   12/20/23 160 lb (72.6 kg)   11/13/23 167 lb (75.8 kg)   10/12/23 169 lb 12.8 oz (77 kg)   05/25/23 169 lb 12.8 oz (77 kg)   05/09/23 165 lb (74.8 kg)     Body mass index is 26.69 kg/m².     Results for orders placed or performed in visit on 03/12/24   Comp Metabolic Panel (14)   Result Value Ref Range    Glucose 81 70 - 99 mg/dL    Sodium 136 136 - 145 mmol/L    Potassium 4.5 3.5 - 5.1 mmol/L    Chloride 106 98 - 112 mmol/L    CO2 26.0 21.0 - 32.0 mmol/L    Anion Gap 4 0 - 18 mmol/L    BUN 6 (L) 9 - 23 mg/dL    Creatinine 0.67 0.55 - 1.02 mg/dL    Calcium, Total 9.4 8.5 - 10.1 mg/dL    Calculated Osmolality 279 275 - 295 mOsm/kg    eGFR-Cr 98 >=60 mL/min/1.73m2    AST 19 15 - 37 U/L    ALT 26 13 - 56 U/L    Alkaline Phosphatase 68 50 - 130 U/L    Bilirubin, Total 0.6 0.1 - 2.0 mg/dL    Total Protein 7.4 6.4 - 8.2 g/dL    Albumin 3.7 3.4 - 5.0 g/dL    Globulin  3.7 2.8 - 4.4 g/dL    A/G Ratio 1.0 1.0 - 2.0    Patient Fasting for CMP? Yes    HEPATITIS B SURF AB QUANT   Result Value Ref Range    Hepatitis B Surface Antibody Nonreactive (A) Reactive     Heptatitis B Surface Ab Quant <3.10 mIU/mL   Vitamin D, 25-Hydroxy   Result Value Ref Range    Vitamin D, 25OH, Total 39.0 30.0 - 100.0 ng/mL   CBC W/ DIFFERENTIAL   Result Value Ref Range    WBC 4.7 4.0 - 11.0 x10(3) uL    RBC 3.93 3.80 - 5.30 x10(6)uL    HGB 12.1 12.0 - 16.0 g/dL    HCT 37.0 35.0 - 48.0 %    .0 150.0 - 450.0 10(3)uL    MCV 94.1 80.0 - 100.0 fL    MCH 30.8 26.0 - 34.0 pg    MCHC 32.7 31.0 - 37.0 g/dL    RDW 12.4 %    Neutrophil Absolute Prelim 2.84 1.50 - 7.70 x10 (3) uL    Neutrophil Absolute 2.84 1.50 - 7.70 x10(3) uL    Lymphocyte Absolute 1.27 1.00 - 4.00 x10(3) uL     Monocyte Absolute 0.37 0.10 - 1.00 x10(3) uL    Eosinophil Absolute 0.17 0.00 - 0.70 x10(3) uL    Basophil Absolute 0.05 0.00 - 0.20 x10(3) uL    Immature Granulocyte Absolute 0.00 0.00 - 1.00 x10(3) uL    Neutrophil % 60.4 %    Lymphocyte % 27.0 %    Monocyte % 7.9 %    Eosinophil % 3.6 %    Basophil % 1.1 %    Immature Granulocyte % 0.0 %       Current Outpatient Medications   Medication Sig Dispense Refill    levothyroxine 25 MCG Oral Tab Take 1 tablet (25 mcg total) by mouth before breakfast. 90 tablet 1    ustekinumab (STELARA) 90 MG/ML Subcutaneous Solution Prefilled Syringe injection MAINTENANCE: INJECT 1 SYRINGE SUBCUTANEOUSLY EVERY 8 WEEKS. REFRIGERATE. DO NOT FREEZE. 2 each 6    Cholecalciferol (VITAMIN D3) 75 MCG (3000 UT) Oral Tab Take 1 tablet by mouth.      Cyanocobalamin (VITAMIN B 12) 500 MCG Oral Tab Take 1,000 mcg by mouth daily. 30 tablet 0    MULTIPLE VITAMIN OR Take 1 tablet by mouth daily.        Allergies   Allergen Reactions    Ampicillin RASH     \"CAPS\"    Bactrim     Sulfa Drugs Cross Reactors RASH and FEVER    Sulfamethoxazole      \"TMP DS\"      Past Medical History:    Acute pharyngitis    Acute sinusitis, unspecified    Allergy, unspecified not elsewhere classified    Anxiety    Chest pain, unspecified    Crohn's disease (HCC)    IBS (irritable bowel syndrome)    Obesity    Regional enteritis of unspecified site    Crohn's Disease, Crohn's ileitis    Wears glasses      Past Surgical History:   Procedure Laterality Date    Colonoscopy  3/14/14    Repeat in 3 years. persistant ileitis.  consider increasing remicade dose to 7.5mg    Colonoscopy  Nov. 2017    Colonoscopy with biopsy  6/30/2011    Other surgical history  5/8/2009    bxs-illeal and radom colon     Other surgical history      vein surgery    Upper gi endoscopy performed      duodenal diverticulum, mild gastritis      Family History   Problem Relation Age of Onset    Cancer Father 73        prostate    Prostate Cancer Father      Cancer Mother 75        pancreatic    Hypertension Mother     Diabetes Mother         My mother  from pancreatic cancer    Prostate Cancer Mother         Pancreatic cancer    Other (stroke) Paternal Grandmother     Blood Disorder Other         blood clots, fam hx      Social History:   Social History     Socioeconomic History    Marital status:    Tobacco Use    Smoking status: Never    Smokeless tobacco: Never   Vaping Use    Vaping status: Never Used   Substance and Sexual Activity    Alcohol use: Yes     Comment: Wine on occasion    Drug use: No   Other Topics Concern    Caffeine Concern No     Comment: 2 lg a day    Exercise Yes     Comment: 3-5 x a week     Occ: retired. : M. Children: 0.   Exercise: walking.  Diet: vegetarian; vegan     REVIEW OF SYSTEMS:   GENERAL: feels well otherwise  SKIN: denies any unusual skin lesions  EYES:denies blurred vision or double vision  HEENT: denies nasal congestion, sinus pain or ST  LUNGS: denies shortness of breath with exertion  CARDIOVASCULAR: denies chest pain on exertion  GI: denies abdominal pain,denies heartburn  : denies dysuria, vaginal discharge or itching, in menopause   MUSCULOSKELETAL: denies back pain  NEURO: denies headaches  PSYCHE: denies depression or anxiety  HEMATOLOGIC: denies hx of anemia  ENDOCRINE: denies thyroid history  ALL/ASTHMA: denies hx of allergy or asthma    EXAM:   /72 (BP Location: Left arm, Patient Position: Sitting, Cuff Size: adult)   Pulse 69   Resp 16   Ht 5' 6\" (1.676 m)   Wt 165 lb 6 oz (75 kg)   SpO2 100%   BMI 26.69 kg/m²   Body mass index is 26.69 kg/m².   GENERAL: well developed, well nourished,in no apparent distress  SKIN: no rashes,no suspicious lesions; SK right shoulder  HEENT: atraumatic, normocephalic,ears and throat clear  EYES: EOMI, conjunctiva are clear  NECK: supple,no adenopathy,no bruits, no thyromegaly  CHEST: no chest tenderness  BREAST: no dominant or suspicious mass, no nipple  discharge  LUNGS: clear to auscultation  CARDIO: RRR without murmur  GI: good BS's,no masses, HSM or tenderness  :introitus is normal,scant discharge,cervix is pink,no adnexal masses or tenderness, PAP was done   MUSCULOSKELETAL: back is not tender,FROM of the back  EXTREMITIES: no cyanosis, clubbing or edema  NEURO: Oriented times three,cranial nerves are intact,motor and sensory are grossly intact, 2 + knee reflexes bilaterally  VASCULAR: 2 + dorsalis pedal pulses bilaterally; severe varicose veins bilaterally    ASSESSMENT AND PLAN:   Alexa Wolf is a 63 year old female who presents for a complete physical exam.   Pap and pelvic done.   Order put in for mammogram and dexascan.   Self breast exam explained.   Health maintenance, will check:   Orders Placed This Encounter   Procedures    CBC W Differential W Platelet [E]    Comp Metabolic Panel (14) [E]    Lipid Panel [E]    TSH [E]    T4 FREE [E] (Free Thyroxine)    Urinalysis, Routine [E]    Hep B, Adult [62335]    ThinPrep PAP with HPV Reflex Request       Colonoscopy. 1/2027; Dr. GRANT    Last eye exam -- 5/2023  Last dental exam -- 12/2023  Derm -- Dr. Zimmer  Given Hep B #1 today since she was not immune.  See Dr. Cameron.        Pt' s weight is Body mass index is 26.69 kg/m²., recommended low fat diet and aerobic exercise 30 minutes three times weekly.    The patient indicates understanding of these issues and agrees to the plan.  The patient is asked to return in Return in about 1 year (around 6/7/2025) for physical.  .

## 2024-06-10 DIAGNOSIS — E87.1 HYPONATREMIA: Primary | ICD-10-CM

## 2024-06-11 LAB
.: NORMAL
.: NORMAL

## 2024-07-08 ENCOUNTER — NURSE ONLY (OUTPATIENT)
Dept: FAMILY MEDICINE CLINIC | Facility: CLINIC | Age: 64
End: 2024-07-08
Payer: COMMERCIAL

## 2024-07-08 ENCOUNTER — LAB ENCOUNTER (OUTPATIENT)
Dept: LAB | Age: 64
End: 2024-07-08
Attending: FAMILY MEDICINE
Payer: COMMERCIAL

## 2024-07-08 DIAGNOSIS — E87.1 HYPONATREMIA: ICD-10-CM

## 2024-07-08 LAB
ANION GAP SERPL CALC-SCNC: 6 MMOL/L (ref 0–18)
BUN BLD-MCNC: 11 MG/DL (ref 9–23)
CALCIUM BLD-MCNC: 9 MG/DL (ref 8.5–10.1)
CHLORIDE SERPL-SCNC: 100 MMOL/L (ref 98–112)
CO2 SERPL-SCNC: 26 MMOL/L (ref 21–32)
CREAT BLD-MCNC: 0.7 MG/DL
EGFRCR SERPLBLD CKD-EPI 2021: 97 ML/MIN/1.73M2 (ref 60–?)
FASTING STATUS PATIENT QL REPORTED: YES
GLUCOSE BLD-MCNC: 87 MG/DL (ref 70–99)
OSMOLALITY SERPL CALC.SUM OF ELEC: 273 MOSM/KG (ref 275–295)
POTASSIUM SERPL-SCNC: 4.3 MMOL/L (ref 3.5–5.1)
SODIUM SERPL-SCNC: 132 MMOL/L (ref 136–145)

## 2024-07-08 PROCEDURE — 80048 BASIC METABOLIC PNL TOTAL CA: CPT

## 2024-07-08 PROCEDURE — 90471 IMMUNIZATION ADMIN: CPT | Performed by: FAMILY MEDICINE

## 2024-07-08 PROCEDURE — 90746 HEPB VACCINE 3 DOSE ADULT IM: CPT | Performed by: FAMILY MEDICINE

## 2024-07-08 PROCEDURE — 36415 COLL VENOUS BLD VENIPUNCTURE: CPT

## 2024-07-09 DIAGNOSIS — R79.89 LOW SERUM SODIUM: Primary | ICD-10-CM

## 2024-07-30 ENCOUNTER — LAB ENCOUNTER (OUTPATIENT)
Dept: LAB | Age: 64
End: 2024-07-30
Attending: FAMILY MEDICINE
Payer: COMMERCIAL

## 2024-07-30 DIAGNOSIS — R79.89 LOW SERUM SODIUM: ICD-10-CM

## 2024-07-30 LAB
ANION GAP SERPL CALC-SCNC: 5 MMOL/L (ref 0–18)
BUN BLD-MCNC: 11 MG/DL (ref 9–23)
CALCIUM BLD-MCNC: 9.7 MG/DL (ref 8.7–10.4)
CHLORIDE SERPL-SCNC: 102 MMOL/L (ref 98–112)
CO2 SERPL-SCNC: 26 MMOL/L (ref 21–32)
CREAT BLD-MCNC: 0.75 MG/DL
EGFRCR SERPLBLD CKD-EPI 2021: 89 ML/MIN/1.73M2 (ref 60–?)
FASTING STATUS PATIENT QL REPORTED: NO
GLUCOSE BLD-MCNC: 77 MG/DL (ref 70–99)
OSMOLALITY SERPL CALC.SUM OF ELEC: 274 MOSM/KG (ref 275–295)
POTASSIUM SERPL-SCNC: 4.3 MMOL/L (ref 3.5–5.1)
SODIUM SERPL-SCNC: 133 MMOL/L (ref 136–145)

## 2024-07-30 PROCEDURE — 36415 COLL VENOUS BLD VENIPUNCTURE: CPT

## 2024-07-30 PROCEDURE — 80048 BASIC METABOLIC PNL TOTAL CA: CPT

## 2024-11-11 ENCOUNTER — TELEPHONE (OUTPATIENT)
Dept: FAMILY MEDICINE CLINIC | Facility: CLINIC | Age: 64
End: 2024-11-11

## 2024-11-11 NOTE — TELEPHONE ENCOUNTER
Fainted last Sat am  LOC not sure how long?   When she gain consciousness   She was on her back   Had a hard time getting up  Dizzy, back of the head very tender  Can't recall if slipped    Today still feels \"off\"   Back of the head still very tender  Talks clearly   No N/V , no chest pain, no SOB   did not note any confusion, slurring or weakness    Pt advised to go to the ER d/t LOC, possible head injury  With verbalized understanding  Brandon

## 2024-11-14 ENCOUNTER — HOSPITAL ENCOUNTER (OUTPATIENT)
Facility: HOSPITAL | Age: 64
Setting detail: OBSERVATION
Discharge: HOME | DRG: 176 | End: 2024-11-16
Attending: EMERGENCY MEDICINE | Admitting: STUDENT IN AN ORGANIZED HEALTH CARE EDUCATION/TRAINING PROGRAM
Payer: COMMERCIAL

## 2024-11-14 ENCOUNTER — APPOINTMENT (OUTPATIENT)
Dept: CT IMAGING | Facility: HOSPITAL | Age: 64
DRG: 176 | End: 2024-11-14
Attending: EMERGENCY MEDICINE
Payer: COMMERCIAL

## 2024-11-14 ENCOUNTER — APPOINTMENT (OUTPATIENT)
Dept: CV DIAGNOSTICS | Facility: HOSPITAL | Age: 64
DRG: 176 | End: 2024-11-14
Attending: STUDENT IN AN ORGANIZED HEALTH CARE EDUCATION/TRAINING PROGRAM
Payer: COMMERCIAL

## 2024-11-14 ENCOUNTER — APPOINTMENT (OUTPATIENT)
Dept: CV DIAGNOSTICS | Facility: HOSPITAL | Age: 64
End: 2024-11-14
Attending: STUDENT IN AN ORGANIZED HEALTH CARE EDUCATION/TRAINING PROGRAM
Payer: COMMERCIAL

## 2024-11-14 ENCOUNTER — APPOINTMENT (OUTPATIENT)
Dept: GENERAL RADIOLOGY | Facility: HOSPITAL | Age: 64
DRG: 176 | End: 2024-11-14
Payer: COMMERCIAL

## 2024-11-14 ENCOUNTER — APPOINTMENT (OUTPATIENT)
Dept: CT IMAGING | Facility: HOSPITAL | Age: 64
End: 2024-11-14
Attending: EMERGENCY MEDICINE
Payer: COMMERCIAL

## 2024-11-14 ENCOUNTER — APPOINTMENT (OUTPATIENT)
Dept: GENERAL RADIOLOGY | Facility: HOSPITAL | Age: 64
End: 2024-11-14
Payer: COMMERCIAL

## 2024-11-14 ENCOUNTER — HOSPITAL ENCOUNTER (INPATIENT)
Facility: HOSPITAL | Age: 64
LOS: 2 days | Discharge: HOME | End: 2024-11-16
Attending: EMERGENCY MEDICINE | Admitting: STUDENT IN AN ORGANIZED HEALTH CARE EDUCATION/TRAINING PROGRAM
Payer: COMMERCIAL

## 2024-11-14 DIAGNOSIS — I26.99 OTHER ACUTE PULMONARY EMBOLISM WITHOUT ACUTE COR PULMONALE (HCC): ICD-10-CM

## 2024-11-14 DIAGNOSIS — R51.9 ACUTE NONINTRACTABLE HEADACHE, UNSPECIFIED HEADACHE TYPE: ICD-10-CM

## 2024-11-14 DIAGNOSIS — I26.99 ACUTE PULMONARY EMBOLISM WITHOUT ACUTE COR PULMONALE, UNSPECIFIED PULMONARY EMBOLISM TYPE (HCC): Primary | ICD-10-CM

## 2024-11-14 DIAGNOSIS — R42 DIZZINESS: ICD-10-CM

## 2024-11-14 PROBLEM — R26.81 GAIT INSTABILITY: Status: ACTIVE | Noted: 2024-11-14

## 2024-11-14 PROBLEM — R56.9 SEIZURE (HCC): Status: ACTIVE | Noted: 2024-11-14

## 2024-11-14 PROBLEM — R55 SYNCOPE: Status: ACTIVE | Noted: 2024-11-14

## 2024-11-14 LAB
ALBUMIN SERPL-MCNC: 4.5 G/DL (ref 3.2–4.8)
ALBUMIN/GLOB SERPL: 1.5 {RATIO} (ref 1–2)
ALP LIVER SERPL-CCNC: 72 U/L
ALT SERPL-CCNC: 17 U/L
ANION GAP SERPL CALC-SCNC: 5 MMOL/L (ref 0–18)
APTT PPP: 41.3 SECONDS (ref 23–36)
APTT PPP: >240 SECONDS (ref 23–36)
AST SERPL-CCNC: 24 U/L (ref ?–34)
BASOPHILS # BLD AUTO: 0.06 X10(3) UL (ref 0–0.2)
BASOPHILS NFR BLD AUTO: 1 %
BILIRUB SERPL-MCNC: 0.6 MG/DL (ref 0.2–1.1)
BILIRUB UR QL STRIP.AUTO: NEGATIVE
BUN BLD-MCNC: 8 MG/DL (ref 9–23)
CALCIUM BLD-MCNC: 9.4 MG/DL (ref 8.7–10.4)
CHLORIDE SERPL-SCNC: 103 MMOL/L (ref 98–112)
CLARITY UR REFRACT.AUTO: CLEAR
CO2 SERPL-SCNC: 27 MMOL/L (ref 21–32)
CREAT BLD-MCNC: 0.79 MG/DL
EGFRCR SERPLBLD CKD-EPI 2021: 84 ML/MIN/1.73M2 (ref 60–?)
EOSINOPHIL # BLD AUTO: 0.14 X10(3) UL (ref 0–0.7)
EOSINOPHIL NFR BLD AUTO: 2.2 %
ERYTHROCYTE [DISTWIDTH] IN BLOOD BY AUTOMATED COUNT: 12.6 %
GLOBULIN PLAS-MCNC: 3 G/DL (ref 2–3.5)
GLUCOSE BLD-MCNC: 128 MG/DL (ref 70–99)
GLUCOSE BLD-MCNC: 87 MG/DL (ref 70–99)
GLUCOSE UR STRIP.AUTO-MCNC: NORMAL MG/DL
HCT VFR BLD AUTO: 38.5 %
HGB BLD-MCNC: 12.6 G/DL
HYALINE CASTS #/AREA URNS AUTO: PRESENT /LPF
IMM GRANULOCYTES # BLD AUTO: 0.02 X10(3) UL (ref 0–1)
IMM GRANULOCYTES NFR BLD: 0.3 %
KETONES UR STRIP.AUTO-MCNC: NEGATIVE MG/DL
LEUKOCYTE ESTERASE UR QL STRIP.AUTO: NEGATIVE
LYMPHOCYTES # BLD AUTO: 1.46 X10(3) UL (ref 1–4)
LYMPHOCYTES NFR BLD AUTO: 23.3 %
MCH RBC QN AUTO: 30.2 PG (ref 26–34)
MCHC RBC AUTO-ENTMCNC: 32.7 G/DL (ref 31–37)
MCV RBC AUTO: 92.3 FL
MONOCYTES # BLD AUTO: 0.56 X10(3) UL (ref 0.1–1)
MONOCYTES NFR BLD AUTO: 8.9 %
NEUTROPHILS # BLD AUTO: 4.03 X10 (3) UL (ref 1.5–7.7)
NEUTROPHILS # BLD AUTO: 4.03 X10(3) UL (ref 1.5–7.7)
NEUTROPHILS NFR BLD AUTO: 64.3 %
NITRITE UR QL STRIP.AUTO: NEGATIVE
NT-PROBNP SERPL-MCNC: 410 PG/ML (ref ?–125)
OSMOLALITY SERPL CALC.SUM OF ELEC: 278 MOSM/KG (ref 275–295)
PH UR STRIP.AUTO: 6.5 [PH] (ref 5–8)
PLATELET # BLD AUTO: 246 10(3)UL (ref 150–450)
POTASSIUM SERPL-SCNC: 4.2 MMOL/L (ref 3.5–5.1)
PROT SERPL-MCNC: 7.5 G/DL (ref 5.7–8.2)
PROT UR STRIP.AUTO-MCNC: NEGATIVE MG/DL
RBC # BLD AUTO: 4.17 X10(6)UL
SODIUM SERPL-SCNC: 135 MMOL/L (ref 136–145)
SP GR UR STRIP.AUTO: 1.01 (ref 1–1.03)
TROPONIN I SERPL HS-MCNC: 4 NG/L
UROBILINOGEN UR STRIP.AUTO-MCNC: NORMAL MG/DL
WBC # BLD AUTO: 6.3 X10(3) UL (ref 4–11)

## 2024-11-14 PROCEDURE — 70498 CT ANGIOGRAPHY NECK: CPT | Performed by: EMERGENCY MEDICINE

## 2024-11-14 PROCEDURE — 93306 TTE W/DOPPLER COMPLETE: CPT | Performed by: STUDENT IN AN ORGANIZED HEALTH CARE EDUCATION/TRAINING PROGRAM

## 2024-11-14 PROCEDURE — 99223 1ST HOSP IP/OBS HIGH 75: CPT | Performed by: STUDENT IN AN ORGANIZED HEALTH CARE EDUCATION/TRAINING PROGRAM

## 2024-11-14 PROCEDURE — 71260 CT THORAX DX C+: CPT | Performed by: EMERGENCY MEDICINE

## 2024-11-14 PROCEDURE — 71045 X-RAY EXAM CHEST 1 VIEW: CPT | Performed by: EMERGENCY MEDICINE

## 2024-11-14 PROCEDURE — 70496 CT ANGIOGRAPHY HEAD: CPT | Performed by: EMERGENCY MEDICINE

## 2024-11-14 RX ORDER — ACETAMINOPHEN 325 MG/1
650 TABLET ORAL EVERY 4 HOURS PRN
Status: DISCONTINUED | OUTPATIENT
Start: 2024-11-14 | End: 2024-11-16

## 2024-11-14 RX ORDER — HYDROCODONE BITARTRATE AND ACETAMINOPHEN 5; 325 MG/1; MG/1
2 TABLET ORAL EVERY 4 HOURS PRN
Status: DISCONTINUED | OUTPATIENT
Start: 2024-11-14 | End: 2024-11-16

## 2024-11-14 RX ORDER — BISACODYL 10 MG
10 SUPPOSITORY, RECTAL RECTAL
Status: DISCONTINUED | OUTPATIENT
Start: 2024-11-14 | End: 2024-11-16

## 2024-11-14 RX ORDER — SODIUM PHOSPHATE, DIBASIC AND SODIUM PHOSPHATE, MONOBASIC 7; 19 G/230ML; G/230ML
1 ENEMA RECTAL ONCE AS NEEDED
Status: DISCONTINUED | OUTPATIENT
Start: 2024-11-14 | End: 2024-11-16

## 2024-11-14 RX ORDER — HEPARIN SODIUM AND DEXTROSE 10000; 5 [USP'U]/100ML; G/100ML
INJECTION INTRAVENOUS CONTINUOUS
Status: DISCONTINUED | OUTPATIENT
Start: 2024-11-14 | End: 2024-11-16

## 2024-11-14 RX ORDER — GUAIFENESIN 600 MG/1
600 TABLET, EXTENDED RELEASE ORAL 2 TIMES DAILY PRN
Status: DISCONTINUED | OUTPATIENT
Start: 2024-11-14 | End: 2024-11-16

## 2024-11-14 RX ORDER — BENZONATATE 100 MG/1
200 CAPSULE ORAL 3 TIMES DAILY PRN
Status: DISCONTINUED | OUTPATIENT
Start: 2024-11-14 | End: 2024-11-16

## 2024-11-14 RX ORDER — ONDANSETRON 2 MG/ML
4 INJECTION INTRAMUSCULAR; INTRAVENOUS EVERY 4 HOURS PRN
Status: DISCONTINUED | OUTPATIENT
Start: 2024-11-14 | End: 2024-11-14 | Stop reason: ALTCHOICE

## 2024-11-14 RX ORDER — ECHINACEA PURPUREA EXTRACT 125 MG
1 TABLET ORAL
Status: DISCONTINUED | OUTPATIENT
Start: 2024-11-14 | End: 2024-11-16

## 2024-11-14 RX ORDER — HEPARIN SODIUM AND DEXTROSE 10000; 5 [USP'U]/100ML; G/100ML
18 INJECTION INTRAVENOUS ONCE
Status: COMPLETED | OUTPATIENT
Start: 2024-11-14 | End: 2024-11-14

## 2024-11-14 RX ORDER — HYDROCODONE BITARTRATE AND ACETAMINOPHEN 5; 325 MG/1; MG/1
1 TABLET ORAL EVERY 4 HOURS PRN
Status: DISCONTINUED | OUTPATIENT
Start: 2024-11-14 | End: 2024-11-16

## 2024-11-14 RX ORDER — ONDANSETRON 2 MG/ML
4 INJECTION INTRAMUSCULAR; INTRAVENOUS EVERY 6 HOURS PRN
Status: DISCONTINUED | OUTPATIENT
Start: 2024-11-14 | End: 2024-11-16

## 2024-11-14 RX ORDER — HEPARIN SODIUM 1000 [USP'U]/ML
80 INJECTION, SOLUTION INTRAVENOUS; SUBCUTANEOUS ONCE
Status: COMPLETED | OUTPATIENT
Start: 2024-11-14 | End: 2024-11-14

## 2024-11-14 RX ORDER — PROCHLORPERAZINE EDISYLATE 5 MG/ML
5 INJECTION INTRAMUSCULAR; INTRAVENOUS EVERY 8 HOURS PRN
Status: DISCONTINUED | OUTPATIENT
Start: 2024-11-14 | End: 2024-11-16

## 2024-11-14 RX ORDER — MORPHINE SULFATE 2 MG/ML
1 INJECTION, SOLUTION INTRAMUSCULAR; INTRAVENOUS EVERY 2 HOUR PRN
Status: DISCONTINUED | OUTPATIENT
Start: 2024-11-14 | End: 2024-11-16

## 2024-11-14 RX ORDER — POLYETHYLENE GLYCOL 3350 17 G/17G
17 POWDER, FOR SOLUTION ORAL DAILY PRN
Status: DISCONTINUED | OUTPATIENT
Start: 2024-11-14 | End: 2024-11-16

## 2024-11-14 RX ORDER — SENNOSIDES 8.6 MG
17.2 TABLET ORAL NIGHTLY PRN
Status: DISCONTINUED | OUTPATIENT
Start: 2024-11-14 | End: 2024-11-16

## 2024-11-14 RX ORDER — MORPHINE SULFATE 2 MG/ML
2 INJECTION, SOLUTION INTRAMUSCULAR; INTRAVENOUS EVERY 2 HOUR PRN
Status: DISCONTINUED | OUTPATIENT
Start: 2024-11-14 | End: 2024-11-16

## 2024-11-14 RX ORDER — LEVOTHYROXINE SODIUM 25 UG/1
25 TABLET ORAL
Status: DISCONTINUED | OUTPATIENT
Start: 2024-11-15 | End: 2024-11-16

## 2024-11-14 RX ORDER — MORPHINE SULFATE 4 MG/ML
4 INJECTION, SOLUTION INTRAMUSCULAR; INTRAVENOUS EVERY 2 HOUR PRN
Status: DISCONTINUED | OUTPATIENT
Start: 2024-11-14 | End: 2024-11-16

## 2024-11-14 NOTE — H&P
[facility]     Alexa Wolf Patient Status:  Emergency    11/15/1960 MRN [id number]   Location [facility] EMERGENCY DEPARTMENT Attending Ramsey Elder*   Hosp Day # 0 PCP Madai Stephens DO     Chief Complaint: Syncope    Subjective:    History of Present Illness:     Alexa Wolf is a 63 year old female with past medical history of hypothyroidism, Crohn's disease who presents ED for syncope.  Last Friday  morning, patient stood up to go to the bathroom and felt lightheaded.  She felt like everything was moving around her.  She then awoke on the ground and found that she had called her self.  She had gait instability afterwards.  Since the event she has been experiencing a posterior headache.  Fall was unwitnessed and it is unclear if she had any convulsions.  She not bite her tongue.  She estimates she may have been unconscious for several minutes.  She denies any blurred vision, chest pain, dyspnea, nausea, vomiting, weakness.    History/Other:    Past Medical History:  Past Medical History:    Acute pharyngitis    Acute sinusitis, unspecified    Allergy, unspecified not elsewhere classified    Anxiety    Chest pain, unspecified    Crohn's disease (HCC)    IBS (irritable bowel syndrome)    Obesity    Regional enteritis of unspecified site    Crohn's Disease, Crohn's ileitis    Wears glasses     Past Surgical History:   Past Surgical History:   Procedure Laterality Date    Colonoscopy  3/14/14    Repeat in 3 years. persistant ileitis.  consider increasing remicade dose to 7.5mg    Colonoscopy  2017    Colonoscopy with biopsy  2011    Other surgical history  2009    bxs-illeal and radom colon     Other surgical history      vein surgery    Upper gi endoscopy performed      duodenal diverticulum, mild gastritis      Family History:   Family History   Problem Relation Age of Onset    Cancer Father 73        prostate    Prostate  Cancer Father     Cancer Mother 75        pancreatic    Hypertension Mother     Diabetes Mother         My mother  from pancreatic cancer    Prostate Cancer Mother         Pancreatic cancer    Other (stroke) Paternal Grandmother     Blood Disorder Other         blood clots, fam hx     Social History:    reports that she has never smoked. She has never used smokeless tobacco. She reports current alcohol use. She reports that she does not use drugs.     Allergies: Allergies[1]    Medications:  Medications Ordered Prior to Encounter[2]    Review of Systems:   A comprehensive review of systems was completed.    Pertinent positives and negatives noted in the HPI.    Objective:   Physical Exam:    /64   Pulse 57   Temp 98.4 °F (36.9 °C) (Oral)   Resp 14   Ht 5' 6\" (1.676 m)   Wt 158 lb (71.7 kg)   SpO2 100%   BMI 25.50 kg/m²   General: No acute distress, Alert  Respiratory: No rhonchi, no wheezes  Cardiovascular: S1, S2. Regular rate and rhythm  Abdomen: Soft, Non-tender, non-distended, positive bowel sounds  Neuro: No new focal deficits  Extremities: No edema    Results:    Labs:      Labs Last 24 Hours:    Recent Labs   Lab 24  1107   RBC 4.17   HGB 12.6   HCT 38.5   MCV 92.3   MCH 30.2   MCHC 32.7   RDW 12.6   NEPRELIM 4.03   WBC 6.3   .0       Recent Labs   Lab 24  1107   GLU 87   BUN 8*   CREATSERUM 0.79   EGFRCR 84   CA 9.4   ALB 4.5   *   K 4.2      CO2 27.0   ALKPHO 72   AST 24   ALT 17   BILT 0.6   TP 7.5       No results found for: \"PT\", \"INR\"    Recent Labs   Lab 24  1107   TROPHS 4       No results for input(s): \"TROP\", \"PBNP\" in the last 168 hours.    No results for input(s): \"PCT\" in the last 168 hours.    Imaging: Imaging data reviewed in Epic.    Assessment & Plan:      #Syncope  #Gait instability  #Possible seizure  -CTA brain/carotids showed no acute abnormalities  -EEG ordered  -Neuro consulted  -PT OT consulted    #Right pulmonary emboli  -CT chest  reviewed  -Echo ordered  -Heparin drip  -Hematology consulted    #Hypothyroidism  -Levothyroxine    #Crohn's disease  -On Stelara      Plan of care discussed with patient    Donna Pennington DO    Supplementary Documentation:     The 21st Century Cures Act makes medical notes like these available to patients in the interest of transparency. Please be advised this is a medical document. Medical documents are intended to carry relevant information, facts as evident, and the clinical opinion of the practitioner. The medical note is intended as peer to peer communication and may appear blunt or direct. It is written in medical language and may contain abbreviations or verbiage that are unfamiliar.                                       [1]   Allergies  Allergen Reactions    Ampicillin RASH     \"CAPS\"    Bactrim     Sulfa Drugs Cross Reactors RASH and FEVER    Sulfamethoxazole      \"TMP DS\"   [2]   No current facility-administered medications on file prior to encounter.     Current Outpatient Medications on File Prior to Encounter   Medication Sig Dispense Refill    levothyroxine 25 MCG Oral Tab Take 1 tablet (25 mcg total) by mouth before breakfast. 90 tablet 1    ustekinumab (STELARA) 90 MG/ML Subcutaneous Solution Prefilled Syringe injection MAINTENANCE: INJECT 1 SYRINGE SUBCUTANEOUSLY EVERY 8 WEEKS. REFRIGERATE. DO NOT FREEZE. 2 each 6    Cholecalciferol (VITAMIN D3) 75 MCG (3000 UT) Oral Tab Take 1 tablet by mouth.      Cyanocobalamin (VITAMIN B 12) 500 MCG Oral Tab Take 1,000 mcg by mouth daily. 30 tablet 0    MULTIPLE VITAMIN OR Take 1 tablet by mouth daily.

## 2024-11-14 NOTE — ED INITIAL ASSESSMENT (HPI)
Pt here with a friend, states last Friday at approx 0500, was going to the bathroom and she started spinning dizziness and passed out. Possibly hurt her head. Today she still feels lightheaded.

## 2024-11-14 NOTE — ED QUICK NOTES
Pt ambulatory to the bathroom with assistance, to urinate, and for urine collection. Pt instructed , verbalizing understanding

## 2024-11-14 NOTE — ED PROVIDER NOTES
Patient Seen in: [facility] Emergency Department      History     Chief Complaint   Patient presents with    Syncope    Fall     Stated Complaint: syncope with incontinence and fall last friday, still feeling lightheaded    Subjective:   HPI      63-year-old female presents today for evaluation.  On  Thursday of last week, patient went to bed feeling well.  She awoke Friday early morning to use the bathroom.  After standing up, she felt feeling lightheaded like everything around her was moving.  She then awoke on the ground, having soiled herself.  since that event, she has experienced a posterior headache, gait instability along with a wooziness.   she has no blurred vision, vomiting, focal weakness, chest pain or shortness of breath.  No one witnessed her fall to the ground so it is unclear if she had any convulsions.  She did not bite her tongue.  She estimates that she maybe was  unconscious for several minutes.    Objective:     Past Medical History:    Acute pharyngitis    Acute sinusitis, unspecified    Allergy, unspecified not elsewhere classified    Anxiety    Chest pain, unspecified    Crohn's disease (HCC)    IBS (irritable bowel syndrome)    Obesity    Regional enteritis of unspecified site    Crohn's Disease, Crohn's ileitis    Wears glasses              Past Surgical History:   Procedure Laterality Date    Colonoscopy  3/14/14    Repeat in 3 years. persistant ileitis.  consider increasing remicade dose to 7.5mg    Colonoscopy  Nov. 2017    Colonoscopy with biopsy  6/30/2011    Other surgical history  5/8/2009    bxs-illeal and radom colon     Other surgical history      vein surgery    Upper gi endoscopy performed      duodenal diverticulum, mild gastritis                Social History     Socioeconomic History    Marital status:    Tobacco Use    Smoking status: Never    Smokeless tobacco: Never   Vaping Use    Vaping status: Never Used   Substance and Sexual Activity    Alcohol use: Yes      Comment: Wine on occasion    Drug use: No   Other Topics Concern    Caffeine Concern No     Comment: 2 lg a day    Exercise Yes     Comment: 3-5 x a week                  Physical Exam     ED Triage Vitals   BP 11/14/24 1145 143/61   Pulse 11/14/24 1145 55   Resp 11/14/24 1145 14   Temp 11/14/24 1328 98.4 °F (36.9 °C)   Temp src 11/14/24 1328 Oral   SpO2 11/14/24 1145 100 %   O2 Device 11/14/24 1145 None (Room air)       Current Vitals:   Vital Signs  BP: 155/62  Pulse: 60  Resp: 17  Temp: 98.4 °F (36.9 °C)  Temp src: Oral  MAP (mmHg): 86    Oxygen Therapy  SpO2: 99 %  O2 Device: None (Room air)        Physical Exam  Vitals and nursing note reviewed.   Constitutional:       Appearance: Normal appearance.   HENT:      Head: Normocephalic and atraumatic.      Nose: Nose normal.      Mouth/Throat:      Mouth: Mucous membranes are moist.   Eyes:      Extraocular Movements: Extraocular movements intact.      Pupils: Pupils are equal, round, and reactive to light.   Cardiovascular:      Rate and Rhythm: Normal rate and regular rhythm.   Pulmonary:      Effort: Pulmonary effort is normal.      Breath sounds: Normal breath sounds.   Abdominal:      Palpations: Abdomen is soft.      Tenderness: There is no abdominal tenderness.   Musculoskeletal:         General: Normal range of motion.      Cervical back: Neck supple.   Skin:     General: Skin is warm.   Neurological:      General: No focal deficit present.      Mental Status: She is alert.      Cranial Nerves: No cranial nerve deficit.      Sensory: No sensory deficit.      Motor: No weakness.      Coordination: Coordination normal.   Psychiatric:         Mood and Affect: Mood normal.         ED Course     Labs Reviewed   COMP METABOLIC PANEL (14) - Abnormal; Notable for the following components:       Result Value    Sodium 135 (*)     BUN 8 (*)     All other components within normal limits   URINALYSIS WITH CULTURE REFLEX - Abnormal; Notable for the following  components:    Blood Urine 1+ (*)     RBC Urine 6-10 (*)     Squamous Epi. Cells Few (*)     Hyaline Casts Present (*)     All other components within normal limits   PTT, ACTIVATED - Abnormal; Notable for the following components:    PTT 41.3 (*)     All other components within normal limits   PRO BETA NATRIURETIC PEPTIDE - Abnormal; Notable for the following components:    Pro-Beta Natriuretic Peptide 410 (*)     All other components within normal limits   POCT GLUCOSE - Abnormal; Notable for the following components:    POC Glucose 128 (*)     All other components within normal limits   TROPONIN I HIGH SENSITIVITY - Normal   CBC WITH DIFFERENTIAL WITH PLATELET   RAINBOW DRAW LAVENDER   RAINBOW DRAW LIGHT GREEN   RAINBOW DRAW BLUE   RAINBOW DRAW GOLD   RAINBOW DRAW LAVENDER   RAINBOW DRAW LIGHT GREEN   RAINBOW DRAW BLUE     EKG    Rate, intervals and axes as noted on EKG Report.  Rate: 63  Rhythm: Sinus Rhythm  Reading: No STEMI                CT CHEST PE AORTA (IV ONLY) (CPT=71260)    Result Date: 11/14/2024  PROCEDURE:  CT CHEST PE AORTA (IV ONLY) (CPT=71260)  COMPARISON:  [facility], XR, XR CHEST AP PORTABLE  (CPT=71045), 11/14/2024, 11:27 AM.  [facility] , XR, XR CHEST PA + LAT CHEST (CPT=71020), 11/16/2015, 1:55 PM.  INDICATIONS:  syncope with incontinence and fall last friday, still feeling lightheaded  TECHNIQUE:  CT images were obtained with non-ionic intravenous contrast material. Dose reduction techniques were used. Dose information is transmitted to the ACR (American College of Radiology) NRDR (National Radiology Data Registry) which includes the Dose Index Registry.  PATIENT STATED HISTORY:(As transcribed by Technologist)  Lightheadedness since last week after having a fall   CONTRAST USED:  85cc of Isovue 370  FINDINGS:  LUNGS:  1.7 x 1.6 cm calcified granuloma noted.  There are a few other punctate smaller calcified granuloma are seen in both lower lobes. Mild dependent atelectasis is noted.  No  consolidation or pulmonary edema identified. VASCULATURE:  Proximal segmental and subsegmental pulmonary emboli are seen within the right middle lobe and right lower lobe.  No dilatation of the main pulmonary artery. TERESA:  No mass or adenopathy.  MEDIASTINUM:  Fluid in pericardial recesses are seen within the mediastinum.  No adenopathy identified. CARDIAC:  Small pericardial effusion noted. PLEURA:  No pleural effusion or pneumothorax. THORACIC AORTA:  No aneurysm.  CHEST WALL:  No mass or axillary adenopathy.  LIMITED ABDOMEN:  Limited images of the upper abdomen are unremarkable.  BONES:  Mild degenerative changes are seen in the thoracic spine.            CONCLUSION:  There are proximal segmental and subsegmental branch pulmonary emboli identified within the right middle lobe and right lower lobe.  Overall volume of the pulmonary emboli is small.  The critical test results were called to Dr. Elder at 1317 hours on 11/14/2024 with appropriate read back.   LOCATION:  [facility]   Dictated by (CST): Salomón Vera MD on 11/14/2024 at 1:09 PM     Finalized by (CST): Salomón Vera MD on 11/14/2024 at 1:18 PM       CTA BRAIN + CTA CAROTIDS (CPT=70496/95613)    Result Date: 11/14/2024  PROCEDURE:  CTA BRAIN + CTA CAROTIDS (CPT=70496/35951)  COMPARISON:  None.  INDICATIONS:  syncope with incontinence and fall last friday, still feeling lightheaded  TECHNIQUE:  CT angiography of the head and neck were obtained with non-ionic contrast.  3D volume rendering images were obtained by the technologist as well as the radiologist on an independent workstation.  Multiplanar 3D reformatted imaging including multiplanar MIP images were obtained.  Curved planar reformats were performed through the carotid and vertebral arteries. All measurements obtained in this exam were performed using NASCET criteria.  Dose reduction techniques were used. Dose information is transmitted to the ACR (American College of Radiology) NRDR  (National Radiology Data Registry) which includes the Dose Index Registry.  PATIENT STATED HISTORY:(As transcribed by Technologist)  Lightheadedness since last week after having a fall    CONTRAST USED:  75cc of Isovue 370  FINDINGS: Ventricles and sulci are within normal limits.  There is no midline shift or mass-effect.  The basal cisterns are patent.  The gray-white matter differentiation is intact.  There is no acute intracranial hemorrhage or extra-axial fluid collection.  No evidence of acute territorial infarction.  Minimal age-indeterminate microvascular ischemic changes in the cerebral white matter.  There is no evident fracture.  Trace ethmoid mucosal thickening.  The mastoid air cells are unremarkable.  Scattered atelectasis/scarring in the partially imaged lungs.  Degenerative changes in the spine.  Scattered fluid in the pericardial recesses.  Minimal mixed plaque noted in cavernous and supraclinoid segments of the internal carotid arteries without hemodynamically significant narrowing.  An anterior communicating artery is seen.  The branches of the anterior cerebral and middle cerebral arteries are unremarkable.  A small right posterior communicating artery is seen.  The left posterior communicating artery is not well seen.  The branches of the posterior cerebral and superior cerebellar arteries are unremarkable.  The basilar artery has a normal course and caliber.  The bilateral vertebral arteries are unremarkable.   There is a 3-vessel aortic arch with mild calcified plaque.  The origins of the branch vessels are limited evaluation due to pulsation artifact.  Mild noncalcified plaque noted at the origin of the left subclavian artery without hemodynamically significant narrowing.  The innominate artery and right subclavian artery appear grossly unremarkable.  The common carotid arteries are widely patent.  The carotid bifurcations appear unremarkable.  There is no evidence of hemodynamically  significant stenosis in the carotid bulbs by NASCET criteria.  Subtle beaded appearance of the cervical internal carotid arteries suggesting underlying fibromuscular dysplasia.  The cervical internal carotid arteries are otherwise widely patent.  The vertebral arteries originate from the subclavian arteries.  The origins of the vertebral arteries are patent.  Subtle beaded appearance of the cervical vertebral arteries suggesting underlying fibromuscular dysplasia.  The cervical vertebral arteries  are otherwise widely patent.  The left vertebral artery is mildly dominant.             CONCLUSION:   1. No acute intracranial abnormality identified.  2. Minimal age-indeterminate microvascular ischemic changes in the cerebral white matter. If there is clinical concern for acute ischemia/infarction, an MRI of the brain would be recommended for further evaluation.  3. No evidence of intracranial aneurysm, flow-limiting stenosis, or focal arterial occlusion.  4. No evidence of occlusion, acute dissection, or flow-limiting stenosis in the cervical vertebral or carotid arteries. No evidence of hemodynamically significant carotid stenosis by NASCET criteria.  5. Changes of fibromuscular dysplasia in the cervical internal and cervical vertebral arteries as above.   Please see above for further details.  LOCATION:  [id number]   Dictated by (CST): Calixto Graves MD on 11/14/2024 at 1:09 PM     Finalized by (CST): Calixto Graves MD on 11/14/2024 at 1:12 PM       XR CHEST AP PORTABLE  (CPT=71045)    Result Date: 11/14/2024  PROCEDURE:  XR CHEST AP PORTABLE  (CPT=71045)  TECHNIQUE:  AP chest radiograph was obtained.  COMPARISON:  [facility] , XR, XR CHEST PA + LAT CHEST (CPT=71020), 11/16/2015, 1:55 PM.  INDICATIONS:  syncope with incontinence and fall last friday, still feeling lightheaded  PATIENT STATED HISTORY: (As transcribed by Technologist)  Patient offered no additional history at this time.    FINDINGS:  Cardiac silhouette is  within normal limits.  Focal nodule in the left lower chest is noted which may represent a nipple shadow.  No effusion or pneumothorax.            CONCLUSION:  Left lower lobe opacity is noted.  PA and lateral chest radiographs are recommended.   LOCATION:  [facility]      Dictated by (CST): Gorge Lemos MD on 11/14/2024 at 11:41 AM     Finalized by (CST): Gorge Lemos MD on 11/14/2024 at 11:42 AM             MDM      Differential Diagnosis  63-year-old female presents today for evaluation of a transient unresponsive episode 1 week ago.  She did soil herself however did not bite her tongue.  Since the episode, she has had an unsteady gait and posterior headache.  Currently, she has normal strength in all 4 extremities.  Her gross sensation is intact.  She has no ataxia.  Her speech is clear and fluent cranial nerves appear normal.  Will obtain CTA of the head and neck to assess for vascular occlusion/dissection, intracranial hemorrhage, intracranial mass or subacute infarct.  Plan for labs to assess for anemia, electrolyte abnormality, acute kidney injury or myocardial ischemia.  Will reassess    2:15 pm  ED workup demonstrated a right-sided pulmonary embolism.  Patient remains hemodynamically stable.  I updated patient on findings, will admit to the hospital for further care.  Heparin ordered.  I notified the hospitalist of need for admission.    Discussions of Management   I notified the hospitalist of need for admission.      Admission disposition: 11/14/2024  1:34 PM           Medical Decision Making      Disposition and Plan     Clinical Impression:  1. Acute pulmonary embolism without acute cor pulmonale, unspecified pulmonary embolism type (HCC)    2. Acute nonintractable headache, unspecified headache type    3. Dizziness         Disposition:  Admit  11/14/2024  1:34 pm    Follow-up:  No follow-up provider specified.        Medications Prescribed:  Current Discharge Medication List              Supplementary  Documentation:         Hospital Problems       Present on Admission  Date Reviewed: 6/7/2024            ICD-10-CM Noted POA    * (Principal) Acute pulmonary embolism without acute cor pulmonale, unspecified pulmonary embolism type (HCC) I26.99 11/14/2024 Unknown

## 2024-11-14 NOTE — ED QUICK NOTES
Orders for admission, patient is aware of plan and ready to go upstairs. Any questions, please call ED RN noble at extension [phone].     Patient Covid vaccination status: Fully vaccinated     COVID Test Ordered in ED: None    COVID Suspicion at Admission: N/A    Running Infusions:    continuous dose heparin      None    Mental Status/LOC at time of transport: aox4    Other pertinent information:   CIWA score: N/A   NIH score:  0

## 2024-11-15 ENCOUNTER — NURSE ONLY (OUTPATIENT)
Dept: ELECTROPHYSIOLOGY | Facility: HOSPITAL | Age: 64
End: 2024-11-15
Attending: STUDENT IN AN ORGANIZED HEALTH CARE EDUCATION/TRAINING PROGRAM
Payer: COMMERCIAL

## 2024-11-15 ENCOUNTER — APPOINTMENT (OUTPATIENT)
Dept: ULTRASOUND IMAGING | Facility: HOSPITAL | Age: 64
DRG: 176 | End: 2024-11-15
Attending: INTERNAL MEDICINE
Payer: COMMERCIAL

## 2024-11-15 ENCOUNTER — APPOINTMENT (OUTPATIENT)
Dept: ULTRASOUND IMAGING | Facility: HOSPITAL | Age: 64
End: 2024-11-15
Attending: INTERNAL MEDICINE
Payer: COMMERCIAL

## 2024-11-15 LAB
ALBUMIN SERPL-MCNC: 3.9 G/DL (ref 3.2–4.8)
ALBUMIN/GLOB SERPL: 1.5 {RATIO} (ref 1–2)
ALP LIVER SERPL-CCNC: 65 U/L
ALT SERPL-CCNC: 15 U/L
ANION GAP SERPL CALC-SCNC: 5 MMOL/L (ref 0–18)
APTT PPP: 125.3 SECONDS (ref 23–36)
APTT PPP: 137.9 SECONDS (ref 23–36)
APTT PPP: 223.9 SECONDS (ref 23–36)
AST SERPL-CCNC: 21 U/L (ref ?–34)
ATRIAL RATE: 63 BPM
BASOPHILS # BLD AUTO: 0.07 X10(3) UL (ref 0–0.2)
BASOPHILS NFR BLD AUTO: 0.9 %
BILIRUB SERPL-MCNC: 0.4 MG/DL (ref 0.2–1.1)
BUN BLD-MCNC: 7 MG/DL (ref 9–23)
CALCIUM BLD-MCNC: 9.2 MG/DL (ref 8.7–10.4)
CHLORIDE SERPL-SCNC: 105 MMOL/L (ref 98–112)
CO2 SERPL-SCNC: 26 MMOL/L (ref 21–32)
CREAT BLD-MCNC: 0.7 MG/DL
DEPRECATED HBV CORE AB SER IA-ACNC: 19 NG/ML
EGFRCR SERPLBLD CKD-EPI 2021: 97 ML/MIN/1.73M2 (ref 60–?)
EOSINOPHIL # BLD AUTO: 0.21 X10(3) UL (ref 0–0.7)
EOSINOPHIL NFR BLD AUTO: 2.8 %
ERYTHROCYTE [DISTWIDTH] IN BLOOD BY AUTOMATED COUNT: 12.7 %
FOLATE SERPL-MCNC: 30.2 NG/ML (ref 5.4–?)
GLOBULIN PLAS-MCNC: 2.6 G/DL (ref 2–3.5)
GLUCOSE BLD-MCNC: 93 MG/DL (ref 70–99)
HCT VFR BLD AUTO: 34.3 %
HGB BLD-MCNC: 11.6 G/DL
IMM GRANULOCYTES # BLD AUTO: 0.02 X10(3) UL (ref 0–1)
IMM GRANULOCYTES NFR BLD: 0.3 %
IRON SATN MFR SERPL: 24 %
IRON SERPL-MCNC: 82 UG/DL
LYMPHOCYTES # BLD AUTO: 2.58 X10(3) UL (ref 1–4)
LYMPHOCYTES NFR BLD AUTO: 34.7 %
MAGNESIUM SERPL-MCNC: 2.1 MG/DL (ref 1.6–2.6)
MCH RBC QN AUTO: 30.4 PG (ref 26–34)
MCHC RBC AUTO-ENTMCNC: 33.8 G/DL (ref 31–37)
MCV RBC AUTO: 89.8 FL
MONOCYTES # BLD AUTO: 0.66 X10(3) UL (ref 0.1–1)
MONOCYTES NFR BLD AUTO: 8.9 %
NEUTROPHILS # BLD AUTO: 3.89 X10 (3) UL (ref 1.5–7.7)
NEUTROPHILS # BLD AUTO: 3.89 X10(3) UL (ref 1.5–7.7)
NEUTROPHILS NFR BLD AUTO: 52.4 %
OSMOLALITY SERPL CALC.SUM OF ELEC: 280 MOSM/KG (ref 275–295)
P AXIS: 33 DEGREES
P-R INTERVAL: 160 MS
PLATELET # BLD AUTO: 213 10(3)UL (ref 150–450)
POTASSIUM SERPL-SCNC: 4 MMOL/L (ref 3.5–5.1)
PROT SERPL-MCNC: 6.5 G/DL (ref 5.7–8.2)
Q-T INTERVAL: 408 MS
QRS DURATION: 88 MS
QTC CALCULATION (BEZET): 417 MS
R AXIS: -30 DEGREES
RBC # BLD AUTO: 3.82 X10(6)UL
SODIUM SERPL-SCNC: 136 MMOL/L (ref 136–145)
T AXIS: 19 DEGREES
TOTAL IRON BINDING CAPACITY: 335 UG/DL (ref 250–425)
TRANSFERRIN SERPL-MCNC: 265 MG/DL (ref 250–380)
VENTRICULAR RATE: 63 BPM
VIT B12 SERPL-MCNC: 811 PG/ML (ref 211–911)
WBC # BLD AUTO: 7.4 X10(3) UL (ref 4–11)

## 2024-11-15 PROCEDURE — 99223 1ST HOSP IP/OBS HIGH 75: CPT | Performed by: INTERNAL MEDICINE

## 2024-11-15 PROCEDURE — 99232 SBSQ HOSP IP/OBS MODERATE 35: CPT | Performed by: STUDENT IN AN ORGANIZED HEALTH CARE EDUCATION/TRAINING PROGRAM

## 2024-11-15 PROCEDURE — 99223 1ST HOSP IP/OBS HIGH 75: CPT | Performed by: OTHER

## 2024-11-15 PROCEDURE — 93970 EXTREMITY STUDY: CPT | Performed by: INTERNAL MEDICINE

## 2024-11-15 PROCEDURE — 95819 EEG AWAKE AND ASLEEP: CPT | Performed by: OTHER

## 2024-11-15 RX ORDER — BUTALBITAL, ACETAMINOPHEN AND CAFFEINE 50; 325; 40 MG/1; MG/1; MG/1
1 TABLET ORAL EVERY 4 HOURS PRN
Status: DISCONTINUED | OUTPATIENT
Start: 2024-11-15 | End: 2024-11-16

## 2024-11-15 NOTE — PROCEDURES
[facility] [place] EEG report    Name: Alexa Wolf    Date of Study: 11/15/2024      Routine EEG Report    Ordering Physician: Donna Pennington                             Primary Care Physician: Madai Stephens DO    Technical Aspects:   1. 10-20 International System   2. Referential and Bipolar and monopolar recording montage   3. Routine recording (awake and asleep)   4. Portable -C.E.S.   5. Impedance - 10 kilohms or less     Clinical History     sodium ferric gluconate (Ferrlecit) 125 mg in sodium chloride 0.9% 100mL IVPB premix  125 mg Intravenous Daily    [COMPLETED] iopamidol 76% (ISOVUE-370) injection for power injector  160 mL Intravenous ONCE PRN    [COMPLETED] heparin (Porcine) 1000 UNIT/ML injection - BOLUS IV 5,700 Units  80 Units/kg Intravenous Once    [COMPLETED] heparin (Porcine) 82030 units/250 mL infusion ED (PE/DVT/THROMBUS) INITIAL DOSE  18 Units/kg/hr Intravenous Once    heparin (Porcine) 97178 units/250mL infusion PE/DVT/THROMBUS CONTINUOUS  200-3,000 Units/hr Intravenous Continuous    levothyroxine (Synthroid) tab 25 mcg  25 mcg Oral Before breakfast    melatonin tab 3 mg  3 mg Oral Nightly PRN    guaiFENesin ER (Mucinex) 12 hr tab 600 mg  600 mg Oral BID PRN    benzonatate (Tessalon) cap 200 mg  200 mg Oral TID PRN    glycerin-hypromellose- (Artificial Tears) 0.2-0.2-1 % ophthalmic solution 1 drop  1 drop Both Eyes QID PRN    sodium chloride (Saline Mist) 0.65 % nasal solution 1 spray  1 spray Each Nare Q3H PRN    ondansetron (Zofran) 4 MG/2ML injection 4 mg  4 mg Intravenous Q6H PRN    prochlorperazine (Compazine) 10 MG/2ML injection 5 mg  5 mg Intravenous Q8H PRN    polyethylene glycol (PEG 3350) (Miralax) 17 g oral packet 17 g  17 g Oral Daily PRN    sennosides (Senokot) tab 17.2 mg  17.2 mg Oral Nightly PRN    bisacodyl (Dulcolax) 10 MG rectal suppository 10 mg  10 mg Rectal Daily PRN    fleet enema (Fleet) rectal enema 133 mL  1 enema Rectal Once PRN     acetaminophen (Tylenol) tab 650 mg  650 mg Oral Q4H PRN    Or    HYDROcodone-acetaminophen (Norco) 5-325 MG per tab 1 tablet  1 tablet Oral Q4H PRN    Or    HYDROcodone-acetaminophen (Norco) 5-325 MG per tab 2 tablet  2 tablet Oral Q4H PRN    morphINE PF 2 MG/ML injection 1 mg  1 mg Intravenous Q2H PRN    Or    morphINE PF 2 MG/ML injection 2 mg  2 mg Intravenous Q2H PRN    Or    morphINE PF 4 MG/ML injection 4 mg  4 mg Intravenous Q2H PRN         DIAGNOSIS:DIZZINESS    HISTORY:64 YEAR OLD FEMALE ADMITTED 11/14/24 WITH COMPLAINTS OF CONTINUED LIGHTHEADEDNESS.  THE PREVIOUS FRIDAY, 11/08/24, SHE REPORTS FEELING DIZZY AND WENT TO THE BATHROOM WHERE SHE PASSED OUT AND POSSIBLY HIT HER HEAD.    PAST MEDICAL HISTORY:ACUTE PHARYNGITIS, ACUTE SINUSITIS, ALLERGY, INCREASED ANXIETY, IRRITABLE BOWEL SYNDROME, CHRON'S DISEASE    MEDICATIONS:LEVOTHYROXINE    PREVIOUS EEG:NONE RECENTLY  PATIENT STATE:ALERT AND ORIENTED TIMES 4  PATIENT STATE DURING EEG:AWAKE AND ASLEEP  HYPERVENTILATION PERFORMED:NO  PHOTIC STIMULATION PERFORMED:YES  MACHINE:12    Interpretation    Background activity: Posterior dominant background rhythm consisted of 8-9 Hz, 30-70 uV alpha rhythm, which was reactive to eye closure and eye opening    Slowing: Occasional  frontal, 4-5 Hz, 40-80 uV delta slowing was noted     Sleep: Sleep stage 1 and 2 were noted, characterized by the appearance of vertex waves and sleep spindles, respectively    Hyperventilation was not performed; photic stimulation was performed and produced no significant changes    Epileptiform activity: None    Seizures: none    Events: none           Impression:   This EEG is mildly abnormal due to intermittent frontal delta slowing; this is a nonspecific finding and may be due to mild encephalopathy vs global cerebral dysfunction.     No epileptiform activity, or clinical or electrographic seizures were noted on this awake and asleep  recording.     Jeison Vallecillo MD, Neurology  [facility]  Pager [phone]  11/15/2024

## 2024-11-15 NOTE — PLAN OF CARE
NURSING ADMISSION NOTE      Patient admitted via Cart  Oriented to room.  Safety precautions initiated.  Bed in low position.  Call light in reach.    Admission navigator completed. Heparin gtt infusing. Echo at bedside. Neuro and hematology on consult.

## 2024-11-15 NOTE — CONSULTS
[facility]  [facility] Neurology Initial Consultation     Alexa Wolf Patient Status:  Inpatient    11/15/1960 MRN [id number]   Location [facility] 3NE-A Attending Donna Pennington DO   Hosp Day # 1 PCP Madai Stephens DO     Reason for Consultation:  Syncope      History of Present Illness:  Alexa Wolf is a a(n) 64 year old female with PMHx including but not limited to Crohn's disease, who presented to ER 2024 with syncopal event.  Patient reportedly had new onset of syncope last Friday morning when she stood up and felt light headed like everything was moving around her. She then awoke on the ground confused that she had urinated on her self; of note, she had awoken in the middle of the night to go to the restroom to urinate but suddenly felt dizzy when she was standing and did not make it to the toilet.  She denied any auras of odd taste, smells, or sounds prior to this event. She denied any clear convulsions and did not bite her tongue but lost her urine and thinks she was not aware for several minutes.     Since this time, she has not had any new events, but has continued to feel \"off\" and mildly unsteady.  Earlier today, she had more severe headache similar to migraine headaches that she has had in the past and was treated with Fioricet, with improvement.        CTA brain / carotids done in ER was unremarkable in terms of vascular stenosis or aneurysm, though changes of fibromuscular dysplasia were noted. She had workup which demonstrated R sided PE as well and was admitted for further workup.     Neurology consulted for syncope evaluation.  Currently, she feels well and denies dizziness, focal numbness/tingling/weakness, episodes of speech arrest, or confusion.  She does admit she was stressed around the time that this event initially occurred following results of the election.    Otherwise, patient denies any recent weight change, fevers, chills, nausea, double vision/ blurry  vision / loss of vision, chest pain, palpitations, shortness of breath, rashes, joint pains, bowel incontinence or mood issues.                History:  Past Medical History:    Acute pharyngitis    Acute sinusitis, unspecified    Allergy, unspecified not elsewhere classified    Anxiety    Chest pain, unspecified    Crohn's disease (HCC)    IBS (irritable bowel syndrome)    Obesity    Regional enteritis of unspecified site    Crohn's Disease, Crohn's ileitis    Wears glasses     Past Surgical History:   Procedure Laterality Date    Colonoscopy  3/14/14    Repeat in 3 years. persistant ileitis.  consider increasing remicade dose to 7.5mg    Colonoscopy  2017    Colonoscopy with biopsy  2011    Other surgical history  2009    bxs-illeal and radom colon     Other surgical history      vein surgery    Upper gi endoscopy performed      duodenal diverticulum, mild gastritis     Family History   Problem Relation Age of Onset    Cancer Father 73        prostate    Prostate Cancer Father     Cancer Mother 75        pancreatic    Hypertension Mother     Diabetes Mother         My mother  from pancreatic cancer    Prostate Cancer Mother         Pancreatic cancer    Other (stroke) Paternal Grandmother     Blood Disorder Other         blood clots, fam hx      reports that she has never smoked. She has never used smokeless tobacco. She reports current alcohol use. She reports that she does not use drugs.    Allergies:  Allergies[1]    Medications:  Prescriptions Prior to Admission[2]      Current Facility-Administered Medications:     sodium ferric gluconate (Ferrlecit) 125 mg in sodium chloride 0.9% 100mL IVPB premix, 125 mg, Intravenous, Daily    butalbital-acetaminophen-caffeine (Fioricet) -40 MG per tab 1 tablet, 1 tablet, Oral, Q4H PRN    heparin (Porcine) 61313 units/250mL infusion PE/DVT/THROMBUS CONTINUOUS, 200-3,000 Units/hr, Intravenous, Continuous    levothyroxine (Synthroid) tab 25 mcg, 25 mcg,  Oral, Before breakfast    melatonin tab 3 mg, 3 mg, Oral, Nightly PRN    guaiFENesin ER (Mucinex) 12 hr tab 600 mg, 600 mg, Oral, BID PRN    benzonatate (Tessalon) cap 200 mg, 200 mg, Oral, TID PRN    glycerin-hypromellose- (Artificial Tears) 0.2-0.2-1 % ophthalmic solution 1 drop, 1 drop, Both Eyes, QID PRN    sodium chloride (Saline Mist) 0.65 % nasal solution 1 spray, 1 spray, Each Nare, Q3H PRN    ondansetron (Zofran) 4 MG/2ML injection 4 mg, 4 mg, Intravenous, Q6H PRN    prochlorperazine (Compazine) 10 MG/2ML injection 5 mg, 5 mg, Intravenous, Q8H PRN    polyethylene glycol (PEG 3350) (Miralax) 17 g oral packet 17 g, 17 g, Oral, Daily PRN    sennosides (Senokot) tab 17.2 mg, 17.2 mg, Oral, Nightly PRN    bisacodyl (Dulcolax) 10 MG rectal suppository 10 mg, 10 mg, Rectal, Daily PRN    fleet enema (Fleet) rectal enema 133 mL, 1 enema, Rectal, Once PRN    acetaminophen (Tylenol) tab 650 mg, 650 mg, Oral, Q4H PRN **OR** HYDROcodone-acetaminophen (Norco) 5-325 MG per tab 1 tablet, 1 tablet, Oral, Q4H PRN **OR** HYDROcodone-acetaminophen (Norco) 5-325 MG per tab 2 tablet, 2 tablet, Oral, Q4H PRN    morphINE PF 2 MG/ML injection 1 mg, 1 mg, Intravenous, Q2H PRN **OR** morphINE PF 2 MG/ML injection 2 mg, 2 mg, Intravenous, Q2H PRN **OR** morphINE PF 4 MG/ML injection 4 mg, 4 mg, Intravenous, Q2H PRN    Review of Systems:  A 10-point system was reviewed.  Pertinent positives and negatives are noted in HPI.      Physical Exam:  Blood pressure 110/57, pulse 58, temperature 98.5 °F (36.9 °C), temperature source Oral, resp. rate 16, height 66\", weight 157 lb 13.6 oz (71.6 kg), SpO2 94%.    Gen: well developed, well nourished, no acute distress  HEENT: normocephalic  Heart; normal S1/S2, regular rate and rhythm  Lungs: clear to auscultation bilaterally  Extremities: no edema, peripheral pulses intact  Abd: soft, nt/nd; +bs    Neck: supple, full range of motion; no carotid bruits      Neuro:  Mental  status:  Orientation: Alert and oriented to person, place, time  Naming, repetition and comprehension intact  Memory: normal  Attention/concentration: normal    CN: PERRL, EOMI without nystagmus, VFF, smile symmetric, sensation intact, tongue and palate midline, SCM intact; otherwise, 2-12 intact  Motor: 5/5 strength throughout, tone normal  DTR: 2+ symmetric throughout, toes downgoing bilaterally, no clonus  Sensory: intact to light touch, pinprick, vibration and proprioception  Coord: FNF and HKS intact with no tremor or dysmetria; rapid alternating movements intact bilaterally  Romberg:deferred  Gait: deferred     Laboratory Data:   Lab Results   Component Value Date    WBC 7.4 11/15/2024    HGB 11.6 11/15/2024    HCT 34.3 11/15/2024    .0 11/15/2024    CREATSERUM 0.70 11/15/2024    BUN 7 11/15/2024     11/15/2024    K 4.0 11/15/2024     11/15/2024    CO2 26.0 11/15/2024    GLU 93 11/15/2024    CA 9.2 11/15/2024    ALB 3.9 11/15/2024    ALKPHO 65 11/15/2024    BILT 0.4 11/15/2024    TP 6.5 11/15/2024    AST 21 11/15/2024    ALT 15 11/15/2024    .9 11/15/2024    PTP 13.7 11/15/2024    MG 2.1 11/15/2024    B12 811 11/15/2024       Imaging:  US VENOUS DOPPLER LEG BILAT - DIAG IMG (CPT=93970)    Result Date: 11/15/2024  CONCLUSION:  No evidence for DVT in the visualized deep veins of either lower extremity.   LOCATION:  [facility]   Dictated by (CST): Salomón Vera MD on 11/15/2024 at 6:35 PM     Finalized by (CST): Salomón Vera MD on 11/15/2024 at 6:36 PM       CT CHEST PE AORTA (IV ONLY) (CPT=71260)    Result Date: 11/14/2024  CONCLUSION:  There are proximal segmental and subsegmental branch pulmonary emboli identified within the right middle lobe and right lower lobe.  Overall volume of the pulmonary emboli is small.  The critical test results were called to Dr. Elder at 1317 hours on 11/14/2024 with appropriate read back.   LOCATION:  [facility]   Dictated by (CST): Jody  MD Salomón on 11/14/2024 at 1:09 PM     Finalized by (CST): Salomón Vera MD on 11/14/2024 at 1:18 PM       CTA BRAIN + CTA CAROTIDS (CPT=70496/19333)    Result Date: 11/14/2024  CONCLUSION:   1. No acute intracranial abnormality identified.  2. Minimal age-indeterminate microvascular ischemic changes in the cerebral white matter. If there is clinical concern for acute ischemia/infarction, an MRI of the brain would be recommended for further evaluation.  3. No evidence of intracranial aneurysm, flow-limiting stenosis, or focal arterial occlusion.  4. No evidence of occlusion, acute dissection, or flow-limiting stenosis in the cervical vertebral or carotid arteries. No evidence of hemodynamically significant carotid stenosis by NASCET criteria.  5. Changes of fibromuscular dysplasia in the cervical internal and cervical vertebral arteries as above.   Please see above for further details.  LOCATION:  [id number]   Dictated by (CST): Calixto Graves MD on 11/14/2024 at 1:09 PM     Finalized by (CST): Calixto Graves MD on 11/14/2024 at 1:12 PM       XR CHEST AP PORTABLE  (CPT=71045)    Result Date: 11/14/2024  CONCLUSION:  Left lower lobe opacity is noted.  PA and lateral chest radiographs are recommended.   LOCATION:  [facility]      Dictated by (CST): Gorge Lemos MD on 11/14/2024 at 11:41 AM     Finalized by (CST): Gorge Lemos MD on 11/14/2024 at 11:42 AM         Other procedures    EEG (11/15/2024):   Impression:   This EEG is mildly abnormal due to intermittent frontal delta slowing; this is a nonspecific finding and may be due to mild encephalopathy vs global cerebral dysfunction.     No epileptiform activity, or clinical or electrographic seizures were noted on this awake and asleep  recording.     Assessment:  Principal Problem:    Acute pulmonary embolism without acute cor pulmonale, unspecified pulmonary embolism type (HCC)  Active Problems:    Syncope    Gait instability    Seizure (HCC)    Acute  nonintractable headache, unspecified headache type    Dizziness       Syncope: Overall, despite urinary incontinence, with normal EEG, I suspect this initial event was due to syncope, possibly due to previously undiagnosed PE which has now been discovered leading to brief hypoxic event.   Given that her event occurred over a week ago and CTA brain/carotids was done and demonstrated no evidence for hypodensity to suggest underlying recent stroke in the cerebellum and CTA did not show significant stenosis or aneurysm or vascular pathology, concurrent with normal examination now, I feel that MRI would be low yield.  As previously mentioned, EEG showed no epileptiform activity, making seizure unlikely as well.  However, patient was advised to monitor for new or worsening symptoms which could suggest seizure, including but not limited to auras of odd taste, smells, sounds, episodes of loss of awareness, or speech arrest     She was also advised to report immediately to the emergency room should he experience any symptoms including, but not limited to the following: sudden onset of slurred speech, numbness/tingling/weakness on one side of the body, confusion, double vision, loss of vision, vertigo,     Plan:    Workup as noted above with EEG showing no epileptiform activity, as well as CTA brain/carotids showing no significant stenosis, with normal examination.    No additional neurologic workup at this time and okay for discharge from neurologic viewpoint; will sign off; please call with any questions    Patient may follow-up in neurology clinic in approximately 1 month to reevaluate    70 total minutes spent, including chart review, discussion of pertinent labs and imaging with care team and patient and family with discussion/plan of care as noted above; patient and family allowed to ask questions and all questions answered to the best of my ability     Jeison Vallecillo MD, Neurology  [facility]  Pager  [phone]  11/15/2024                [1]   Allergies  Allergen Reactions    Ampicillin RASH     \"CAPS\"    Bactrim     Sulfa Drugs Cross Reactors RASH and FEVER    Sulfamethoxazole      \"TMP DS\"   [2]   Medications Prior to Admission   Medication Sig Dispense Refill Last Dose/Taking    CALCIUM + D3 OR Take 1 tablet by mouth daily.   11/14/2024 at  9:00 AM    levothyroxine 25 MCG Oral Tab Take 1 tablet (25 mcg total) by mouth before breakfast. 90 tablet 1 11/14/2024 at  5:00 AM    Cyanocobalamin (VITAMIN B 12) 500 MCG Oral Tab Take 1,000 mcg by mouth daily. 30 tablet 0 11/14/2024 at  9:00 AM    MULTIPLE VITAMIN OR Take 1 tablet by mouth daily.   Past Week    ustekinumab (STELARA) 90 MG/ML Subcutaneous Solution Prefilled Syringe injection MAINTENANCE: INJECT 1 SYRINGE SUBCUTANEOUSLY EVERY 8 WEEKS. REFRIGERATE. DO NOT FREEZE. 2 each 6

## 2024-11-15 NOTE — PAYOR COMM NOTE
--------------  ADMISSION REVIEW     Payor: [employer]/HMO/POS/EPO  Subscriber #:  [id number]  Authorization Number: [id number]    Admit date: 11/14/24  Admit time:  3:55 PM       REVIEW DOCUMENTATION:     ED Provider Notes          Subjective:   HPI      63-year-old female presents today for evaluation.  On  Thursday of last week, patient went to bed feeling well.  She awoke Friday early morning to use the bathroom.  After standing up, she felt feeling lightheaded like everything around her was moving.  She then awoke on the ground, having soiled herself.  since that event, she has experienced a posterior headache, gait instability along with a wooziness.   she has no blurred vision, vomiting, focal weakness, chest pain or shortness of breath.  No one witnessed her fall to the ground so it is unclear if she had any convulsions.  She did not bite her tongue.  She estimates that she maybe was  unconscious for several minutes.    Physical Exam     ED Triage Vitals   BP 11/14/24 1145 143/61   Pulse 11/14/24 1145 55   Resp 11/14/24 1145 14   Temp 11/14/24 1328 98.4 °F (36.9 °C)   Temp src 11/14/24 1328 Oral   SpO2 11/14/24 1145 100 %   O2 Device 11/14/24 1145 None (Room air)       Current Vitals:   Vital Signs  BP: 155/62  Pulse: 60  Resp: 17  Temp: 98.4 °F (36.9 °C)  Temp src: Oral  MAP (mmHg): 86    Oxygen Therapy  SpO2: 99 %  O2 Device: None (Room air)        Physical Exam  Vitals and nursing note reviewed.   Constitutional:       Appearance: Normal appearance.   HENT:      Head: Normocephalic and atraumatic.      Nose: Nose normal.      Mouth/Throat:      Mouth: Mucous membranes are moist.   Eyes:      Extraocular Movements: Extraocular movements intact.      Pupils: Pupils are equal, round, and reactive to light.   Cardiovascular:      Rate and Rhythm: Normal rate and regular rhythm.   Pulmonary:      Effort: Pulmonary effort is normal.      Breath sounds: Normal breath sounds.   Abdominal:       Palpations: Abdomen is soft.      Tenderness: There is no abdominal tenderness.   Musculoskeletal:         General: Normal range of motion.      Cervical back: Neck supple.   Skin:     General: Skin is warm.   Neurological:      General: No focal deficit present.      Mental Status: She is alert.      Cranial Nerves: No cranial nerve deficit.      Sensory: No sensory deficit.      Motor: No weakness.      Coordination: Coordination normal.   Psychiatric:         Mood and Affect: Mood normal.         ED Course     Labs Reviewed   COMP METABOLIC PANEL (14) - Abnormal; Notable for the following components:       Result Value    Sodium 135 (*)     BUN 8 (*)     All other components within normal limits   URINALYSIS WITH CULTURE REFLEX - Abnormal; Notable for the following components:    Blood Urine 1+ (*)     RBC Urine 6-10 (*)     Squamous Epi. Cells Few (*)     Hyaline Casts Present (*)     All other components within normal limits   PTT, ACTIVATED - Abnormal; Notable for the following components:    PTT 41.3 (*)     All other components within normal limits   PRO BETA NATRIURETIC PEPTIDE - Abnormal; Notable for the following components:    Pro-Beta Natriuretic Peptide 410 (*)     All other components within normal limits   POCT GLUCOSE - Abnormal; Notable for the following components:    POC Glucose 128 (*)     All other components within normal limits   TROPONIN I HIGH SENSITIVITY - Normal   CBC WITH DIFFERENTIAL WITH PLATELET   EKG    Rate, intervals and axes as noted on EKG Report.  Rate: 63  Rhythm: Sinus Rhythm  Reading: No STEMI    CT CHEST PE AORTA (IV ONLY) (CPT=71260)    Result Date: 11/14/2024  PROCEDURE:  CT CHEST PE AORTA (IV ONLY) (CPT=71260)  COMPARISON:  [facility], XR, XR CHEST AP PORTABLE  (CPT=71045), 11/14/2024, 11:27 AM.  [facility] , XR, XR CHEST PA + LAT CHEST (CPT=71020), 11/16/2015, 1:55 PM.  INDICATIONS:  syncope with incontinence and fall last friday, still feeling lightheaded  TECHNIQUE:  CT images  were obtained with non-ionic intravenous contrast material. Dose reduction techniques were used. Dose information is transmitted to the ACR (American College of Radiology) NRDR (National Radiology Data Registry) which includes the Dose Index Registry.  PATIENT STATED HISTORY:(As transcribed by Technologist)  Lightheadedness since last week after having a fall   CONTRAST USED:  85cc of Isovue 370  FINDINGS:  LUNGS:  1.7 x 1.6 cm calcified granuloma noted.  There are a few other punctate smaller calcified granuloma are seen in both lower lobes. Mild dependent atelectasis is noted.  No consolidation or pulmonary edema identified. VASCULATURE:  Proximal segmental and subsegmental pulmonary emboli are seen within the right middle lobe and right lower lobe.  No dilatation of the main pulmonary artery. TERESA:  No mass or adenopathy.  MEDIASTINUM:  Fluid in pericardial recesses are seen within the mediastinum.  No adenopathy identified. CARDIAC:  Small pericardial effusion noted. PLEURA:  No pleural effusion or pneumothorax. THORACIC AORTA:  No aneurysm.  CHEST WALL:  No mass or axillary adenopathy.  LIMITED ABDOMEN:  Limited images of the upper abdomen are unremarkable.  BONES:  Mild degenerative changes are seen in the thoracic spine.            CONCLUSION:  There are proximal segmental and subsegmental branch pulmonary emboli identified within the right middle lobe and right lower lobe.  Overall volume of the pulmonary emboli is small.  The critical test results were called to Dr. Elder at 1317 hours on 11/14/2024 with appropriate read back.   LOCATION:  [facility]   Dictated by (CST): Salomón Vera MD on 11/14/2024 at 1:09 PM     Finalized by (CST): Salomón Vera MD on 11/14/2024 at 1:18 PM       CTA BRAIN + CTA CAROTIDS (CPT=70496/84158)    Result Date: 11/14/2024  PROCEDURE:  CTA BRAIN + CTA CAROTIDS (CPT=70496/87741)  COMPARISON:  None.  INDICATIONS:  syncope with incontinence and fall last friday, still  feeling lightheaded  TECHNIQUE:  CT angiography of the head and neck were obtained with non-ionic contrast.  3D volume rendering images were obtained by the technologist as well as the radiologist on an independent workstation.  Multiplanar 3D reformatted imaging including multiplanar MIP images were obtained.  Curved planar reformats were performed through the carotid and vertebral arteries. All measurements obtained in this exam were performed using NASCET criteria.  Dose reduction techniques were used. Dose information is transmitted to the ACR (American College of Radiology) NRDR (National Radiology Data Registry) which includes the Dose Index Registry.  PATIENT STATED HISTORY:(As transcribed by Technologist)  Lightheadedness since last week after having a fall    CONTRAST USED:  75cc of Isovue 370  FINDINGS: Ventricles and sulci are within normal limits.  There is no midline shift or mass-effect.  The basal cisterns are patent.  The gray-white matter differentiation is intact.  There is no acute intracranial hemorrhage or extra-axial fluid collection.  No evidence of acute territorial infarction.  Minimal age-indeterminate microvascular ischemic changes in the cerebral white matter.  There is no evident fracture.  Trace ethmoid mucosal thickening.  The mastoid air cells are unremarkable.  Scattered atelectasis/scarring in the partially imaged lungs.  Degenerative changes in the spine.  Scattered fluid in the pericardial recesses.  Minimal mixed plaque noted in cavernous and supraclinoid segments of the internal carotid arteries without hemodynamically significant narrowing.  An anterior communicating artery is seen.  The branches of the anterior cerebral and middle cerebral arteries are unremarkable.  A small right posterior communicating artery is seen.  The left posterior communicating artery is not well seen.  The branches of the posterior cerebral and superior cerebellar arteries are unremarkable.  The  basilar artery has a normal course and caliber.  The bilateral vertebral arteries are unremarkable.   There is a 3-vessel aortic arch with mild calcified plaque.  The origins of the branch vessels are limited evaluation due to pulsation artifact.  Mild noncalcified plaque noted at the origin of the left subclavian artery without hemodynamically significant narrowing.  The innominate artery and right subclavian artery appear grossly unremarkable.  The common carotid arteries are widely patent.  The carotid bifurcations appear unremarkable.  There is no evidence of hemodynamically significant stenosis in the carotid bulbs by NASCET criteria.  Subtle beaded appearance of the cervical internal carotid arteries suggesting underlying fibromuscular dysplasia.  The cervical internal carotid arteries are otherwise widely patent.  The vertebral arteries originate from the subclavian arteries.  The origins of the vertebral arteries are patent.  Subtle beaded appearance of the cervical vertebral arteries suggesting underlying fibromuscular dysplasia.  The cervical vertebral arteries  are otherwise widely patent.  The left vertebral artery is mildly dominant.             CONCLUSION:   1. No acute intracranial abnormality identified.  2. Minimal age-indeterminate microvascular ischemic changes in the cerebral white matter. If there is clinical concern for acute ischemia/infarction, an MRI of the brain would be recommended for further evaluation.  3. No evidence of intracranial aneurysm, flow-limiting stenosis, or focal arterial occlusion.  4. No evidence of occlusion, acute dissection, or flow-limiting stenosis in the cervical vertebral or carotid arteries. No evidence of hemodynamically significant carotid stenosis by NASCET criteria.  5. Changes of fibromuscular dysplasia in the cervical internal and cervical vertebral arteries as above.   Please see above for further details.  LOCATION:  [id number]   Dictated by (CST): Star  MD Calixto on 11/14/2024 at 1:09 PM     Finalized by (CST): Calixto Graves MD on 11/14/2024 at 1:12 PM       XR CHEST AP PORTABLE  (CPT=71045)    Result Date: 11/14/2024  PROCEDURE:  XR CHEST AP PORTABLE  (CPT=71045)  TECHNIQUE:  AP chest radiograph was obtained.  COMPARISON:  [facility] , XR, XR CHEST PA + LAT CHEST (CPT=71020), 11/16/2015, 1:55 PM.  INDICATIONS:  syncope with incontinence and fall last friday, still feeling lightheaded  PATIENT STATED HISTORY: (As transcribed by Technologist)  Patient offered no additional history at this time.    FINDINGS:  Cardiac silhouette is within normal limits.  Focal nodule in the left lower chest is noted which may represent a nipple shadow.  No effusion or pneumothorax.            CONCLUSION:  Left lower lobe opacity is noted.  PA and lateral chest radiographs are recommended.   LOCATION:  [facility]      Dictated by (CST): Gorge Lemos MD on 11/14/2024 at 11:41 AM     Finalized by (CST): Gorge Lemos MD on 11/14/2024 at 11:42 AM            MDM      Differential Diagnosis  63-year-old female presents today for evaluation of a transient unresponsive episode 1 week ago.  She did soil herself however did not bite her tongue.  Since the episode, she has had an unsteady gait and posterior headache.  Currently, she has normal strength in all 4 extremities.  Her gross sensation is intact.  She has no ataxia.  Her speech is clear and fluent cranial nerves appear normal.  Will obtain CTA of the head and neck to assess for vascular occlusion/dissection, intracranial hemorrhage, intracranial mass or subacute infarct.  Plan for labs to assess for anemia, electrolyte abnormality, acute kidney injury or myocardial ischemia.  Will reassess    2:15 pm  ED workup demonstrated a right-sided pulmonary embolism.  Patient remains hemodynamically stable.  I updated patient on findings, will admit to the hospital for further care.  Heparin ordered.  I notified the hospitalist of need for  admission.    Discussions of Management   I notified the hospitalist of need for admission.      Admission disposition: 2024  1:34 PM      Medical Decision Making      Disposition and Plan     Clinical Impression:  1. Acute pulmonary embolism without acute cor pulmonale, unspecified pulmonary embolism type (HCC)    2. Acute nonintractable headache, unspecified headache type    3. Dizziness         Disposition:  Admit  2024  1:34 pm     Hospital Problems       Present on Admission  Date Reviewed: 2024            ICD-10-CM Noted POA    * (Principal) Acute pulmonary embolism without acute cor pulmonale, unspecified pulmonary embolism type (HCC) I26.99 2024 Unknown            History and Physical            Alexa Wolf Patient Status:  Emergency    11/15/1960 MRN [id number]   [facility] EMERGENCY DEPARTMENT Attending Ramsey Elder*   Hosp Day # 0 PCP Madai Stephens DO      Chief Complaint: Syncope        Subjective:  History of Present Illness:      Alexa Wolf is a 63 year old female with past medical history of hypothyroidism, Crohn's disease who presents ED for syncope.  Last Friday  morning, patient stood up to go to the bathroom and felt lightheaded.  She felt like everything was moving around her.  She then awoke on the ground and found that she had called her self.  She had gait instability afterwards.  Since the event she has been experiencing a posterior headache.  Fall was unwitnessed and it is unclear if she had any convulsions.  She not bite her tongue.  She estimates she may have been unconscious for several minutes.  She denies any blurred vision, chest pain, dyspnea, nausea, vomiting, weakness.      Assessment & Plan:  #Syncope  #Gait instability  #Possible seizure  -CTA brain/carotids showed no acute abnormalities  -EEG ordered  -Neuro consulted  -PT OT consulted     #Right pulmonary emboli  -CT chest reviewed  -Echo  ordered  -Heparin drip  -Hematology consulted     #Hypothyroidism  -Levothyroxine     #Crohn's disease  -On Stelara        Plan of care discussed with patient     Donna PenningtonDO     11/15 HOSPITALIST:    Chief Complaint: Syncope        Subjective:  Patient resting in bed and has mild headache but otherwise feels well         Assessment & Plan:  #Syncope  #Gait instability  #Possible seizure  -CTA brain/carotids showed no acute abnormalities  -EEG ordered  -Neuro consulted  -PT OT consulted     #Right pulmonary emboli  -CT chest reviewed  -Echo reviewed and shows EF 60-65%, no RWMA  -LE doppler US ordered   -Heparin drip  -Hematology following      #Hypothyroidism  -Levothyroxine     #Crohn's disease  -On Stelara        Donna Pennington,     MEDICATIONS ADMINISTERED IN LAST 1 DAY:  heparin (Porcine) 1000 UNIT/ML injection - BOLUS IV 5,700 Units       Date Action Dose Route User    11/14/2024 1401 Given 5,700 Units Intravenous Deb Sanchez RN          heparin (Porcine) 62242 units/250mL infusion PE/DVT/THROMBUS CONTINUOUS       Date Action Dose Route User    11/15/2024 1155 Hi-Risk Rate/Dose Change 800 Units/hr Intravenous Dionne Jose RN    11/15/2024 0517 Hi-Risk Rate/Dose Change 900 Units/hr Intravenous Cee Liao RN    11/14/2024 2200 Hi-Risk Rate/Dose Change 1,100 Units/hr Intravenous Cee Liao RN          heparin (Porcine) 98965 units/250 mL infusion ED (PE/DVT/THROMBUS) INITIAL DOSE       Date Action Dose Route User    11/14/2024 1402 New Bag 18 Units/kg/hr × 71.7 kg Intravenous Deb Sanchez RN          iopamidol 76% (ISOVUE-370) injection for power injector       Date Action Dose Route User    11/14/2024 1300 Given 160 mL Intravenous Rolo Bradley          levothyroxine (Synthroid) tab 25 mcg       Date Action Dose Route User    11/15/2024 0720 Given 25 mcg Oral Dionne Jose RN          ondansetron (Zofran) 4 MG/2ML injection 4 mg       Date Action Dose Route User    11/15/2024  0820 Given 4 mg Intravenous Dionne Jose RN            Vitals (last day)       Date/Time Temp Pulse Resp BP SpO2 Weight O2 Device O2 Flow Rate (L/min) [facility]    11/15/24 1202 97.8 °F (36.6 °C) 66 16 136/91 98 % -- None (Room air) 0 L/min OR    11/15/24 0728 97.4 °F (36.3 °C) 53 16 142/60 100 % -- None (Room air) 0 L/min OR    11/15/24 0441 98.5 °F (36.9 °C) 53 18 133/64 96 % -- None (Room air) --     11/14/24 2355 98.2 °F (36.8 °C) 61 18 122/60 99 % -- None (Room air) --     11/14/24 1917 99.1 °F (37.3 °C) 62 18 114/54 96 % -- None (Room air) --     11/14/24 1849 -- -- -- -- -- 157 lb 13.6 oz (71.6 kg) -- -- [facility]    11/14/24 1730 98.6 °F (37 °C) 57 20 128/110 97 % -- None (Room air) 0 L/min IB    11/14/24 1430 -- 64 16 166/70 99 % -- None (Room air) -- [facility]    11/14/24 1330 -- 60 17 155/62 99 % -- None (Room air) -- [facility]    11/14/24 1328 98.4 °F (36.9 °C) -- -- -- -- -- -- -- [facility]    11/14/24 1315 -- 57 14 152/64 100 % -- None (Room air) -- [facility]    11/14/24 1200 -- 57 12 149/66 100 % -- None (Room air) -- [facility]    11/14/24 1145 -- 55 14 143/61 100 % -- None (Room air) -- [facility]    11/14/24 1059 -- -- -- -- -- 158 lb (71.7 kg) -- -- [facility]

## 2024-11-15 NOTE — CONSULTS
[facility] Consult Note    Reason for Consult: PE  Medical Record Number: [id number]   CSN: [id number]   Referring Physician: No ref. provider found  PCP: Madai Stephens DO    History of Present Illness: 64F with a PMH of Crohn's disease presented to the ER on 11/14/24 with syncope, Her syncopal event happened about a week ago but since then has had gait instability. No chest pain or dyspnea. CTA chest on 11/14/24 showed a RML/RLL PE with small burden. Has a long history of varicose veins. Vein procedure 2 years ago. No new leg pain/swelling. No personal or family history of VTE. No hormones. No travel. No surgeries or hospitalizations. Of note PTT elevated on admission.     Review of Systems: 12 Point ROS was completed and pertinent positives are in the HPI    Medications:    Current Facility-Administered Medications:     heparin (Porcine) 53755 units/250mL infusion PE/DVT/THROMBUS CONTINUOUS, 200-3,000 Units/hr, Intravenous, Continuous    levothyroxine (Synthroid) tab 25 mcg, 25 mcg, Oral, Before breakfast    melatonin tab 3 mg, 3 mg, Oral, Nightly PRN    guaiFENesin ER (Mucinex) 12 hr tab 600 mg, 600 mg, Oral, BID PRN    benzonatate (Tessalon) cap 200 mg, 200 mg, Oral, TID PRN    glycerin-hypromellose- (Artificial Tears) 0.2-0.2-1 % ophthalmic solution 1 drop, 1 drop, Both Eyes, QID PRN    sodium chloride (Saline Mist) 0.65 % nasal solution 1 spray, 1 spray, Each Nare, Q3H PRN    ondansetron (Zofran) 4 MG/2ML injection 4 mg, 4 mg, Intravenous, Q6H PRN    prochlorperazine (Compazine) 10 MG/2ML injection 5 mg, 5 mg, Intravenous, Q8H PRN    polyethylene glycol (PEG 3350) (Miralax) 17 g oral packet 17 g, 17 g, Oral, Daily PRN    sennosides (Senokot) tab 17.2 mg, 17.2 mg, Oral, Nightly PRN    bisacodyl (Dulcolax) 10 MG rectal suppository 10 mg, 10 mg, Rectal, Daily PRN    fleet enema (Fleet) rectal enema 133 mL, 1 enema, Rectal, Once PRN    acetaminophen (Tylenol) tab 650 mg, 650 mg, Oral, Q4H  PRN **OR** HYDROcodone-acetaminophen (Norco) 5-325 MG per tab 1 tablet, 1 tablet, Oral, Q4H PRN **OR** HYDROcodone-acetaminophen (Norco) 5-325 MG per tab 2 tablet, 2 tablet, Oral, Q4H PRN    morphINE PF 2 MG/ML injection 1 mg, 1 mg, Intravenous, Q2H PRN **OR** morphINE PF 2 MG/ML injection 2 mg, 2 mg, Intravenous, Q2H PRN **OR** morphINE PF 4 MG/ML injection 4 mg, 4 mg, Intravenous, Q2H PRN    Past Medical History:    Acute pharyngitis    Acute sinusitis, unspecified    Allergy, unspecified not elsewhere classified    Anxiety    Chest pain, unspecified    Crohn's disease (HCC)    IBS (irritable bowel syndrome)    Obesity    Regional enteritis of unspecified site    Crohn's Disease, Crohn's ileitis    Wears glasses     Past Surgical History:   Procedure Laterality Date    Colonoscopy  3/14/14    Repeat in 3 years. persistant ileitis.  consider increasing remicade dose to 7.5mg    Colonoscopy  2017    Colonoscopy with biopsy  2011    Other surgical history  2009    bxs-illeal and radom colon     Other surgical history      vein surgery    Upper gi endoscopy performed      duodenal diverticulum, mild gastritis     Social History     Socioeconomic History    Marital status:    Tobacco Use    Smoking status: Never    Smokeless tobacco: Never   Vaping Use    Vaping status: Never Used   Substance and Sexual Activity    Alcohol use: Yes     Comment: Wine on occasion    Drug use: No      Family History   Problem Relation Age of Onset    Cancer Father 73        prostate    Prostate Cancer Father     Cancer Mother 75        pancreatic    Hypertension Mother     Diabetes Mother         My mother  from pancreatic cancer    Prostate Cancer Mother         Pancreatic cancer    Other (stroke) Paternal Grandmother     Blood Disorder Other         blood clots, fam hx       Physical Exam  /64 (BP Location: Right arm)   Pulse 53   Temp 98.5 °F (36.9 °C) (Oral)   Resp 18   Ht 1.676 m (5' 6\")   Wt 71.6  kg (157 lb 13.6 oz)   SpO2 96%   BMI 25.48 kg/m²    General: NAD, AOX3  HEENT: clear op, mmm, no jvd. EOM intact, no scleral icterus   CV: RRR S1S2 no murmurs  Extremities: +varicose veins   Lungs: CTAB, no increased work of breathing  Abd: soft nt nd +BS no hepatosplenomegaly  Neuro: CN: II-XII grossly intact  Psych: Normal Mood and affect     Results:  Lab Results   Component Value Date    WBC 7.4 11/15/2024    HGB 11.6 (L) 11/15/2024    HCT 34.3 (L) 11/15/2024    MCV 89.8 11/15/2024    .0 11/15/2024     Lab Results   Component Value Date     11/15/2024    K 4.0 11/15/2024    CO2 26.0 11/15/2024     11/15/2024    BUN 7 (L) 11/15/2024    ALB 3.9 11/15/2024       Radiology: reviewed   CTA Chest 11/14/24  CONCLUSION:  There are proximal segmental and subsegmental branch pulmonary emboli identified within the right middle lobe and right lower lobe.  Overall volume of the pulmonary emboli is small.     Assessment and Plan:  RML/RLL PE: dx 11/14/24  -On hep gtt  -Appears unprovoked. But higher risk due to Crohn's disease  -PTT elevated on admission-will check APLS panel   -Plan on indefinite anticoagulation pending neurology evaluation and fall risk  -check dopplers   -home with eliquis     Syncope  -Neurology consulted  -CTA negative for acute infarct. Fibromuscular dysplasia of cervical vertebral arteries     Iron deficiency   -2/2 Crohn's  -IV Ferrlecit ordered. She does not need to stay inpatient for IV iron    Dispo  -Ok to discharge from a hematology standpoint after dopplers are done  -Please start Eliquis 10 mg BID on discharge    [facility]

## 2024-11-15 NOTE — PROGRESS NOTES
[facility]   part of [facility]     Hospitalist Progress Note     Alexa Wolf Patient Status:  Inpatient    11/15/1960 MRN [id number]   Location [facility] 3NE-A Attending Donna Pennington DO   Hosp Day # 1 PCP Madai Stephens DO     Chief Complaint: Syncope    Subjective:     Patient resting in bed and has mild headache but otherwise feels well     Objective:    Review of Systems:   A comprehensive review of systems was completed; pertinent positive and negatives stated in subjective.    Vital signs:  Temp:  [97.4 °F (36.3 °C)-99.1 °F (37.3 °C)] 97.8 °F (36.6 °C)  Pulse:  [53-66] 66  Resp:  [14-20] 16  BP: (114-166)/() 136/91  SpO2:  [96 %-100 %] 98 %    Physical Exam:    General: No acute distress  Respiratory: No wheezes, no rhonchi  Cardiovascular: S1, S2, regular rate and rhythm  Abdomen: Soft, Non-tender, non-distended, positive bowel sounds  Neuro: No new focal deficits.   Extremities: No edema    Diagnostic Data:    Labs:  Recent Labs   Lab 24  1107 11/15/24  0308   WBC 6.3 7.4   HGB 12.6 11.6*   MCV 92.3 89.8   .0 213.0       Recent Labs   Lab 24  1107 11/15/24  0308   GLU 87 93   BUN 8* 7*   CREATSERUM 0.79 0.70   CA 9.4 9.2   ALB 4.5 3.9   * 136   K 4.2 4.0    105   CO2 27.0 26.0   ALKPHO 72 65   AST 24 21   ALT 17 15   BILT 0.6 0.4   TP 7.5 6.5       Estimated Creatinine Clearance: 76 mL/min (based on SCr of 0.7 mg/dL).    Recent Labs   Lab 24  1107   TROPHS 4       No results for input(s): \"PTP\", \"INR\" in the last 168 hours.               Microbiology    No results found for this visit on 24.      Imaging: Reviewed in Epic.    Medications:    sodium ferric gluconate  125 mg Intravenous Daily    levothyroxine  25 mcg Oral Before breakfast       Assessment & Plan:      #Syncope  #Gait instability  #Possible seizure  -CTA brain/carotids showed no acute abnormalities  -EEG ordered  -Neuro consulted  -PT OT consulted     #Right  pulmonary emboli  -CT chest reviewed  -Echo reviewed and shows EF 60-65%, no RWMA  -LE doppler US ordered   -Heparin drip  -Hematology following      #Hypothyroidism  -Levothyroxine     #Crohn's disease  -On Crispin Pennington DO    Supplementary Documentation:     Quality:  DVT Mechanical Prophylaxis:   SCDs,    DVT Pharmacologic Prophylaxis   Medication    heparin (Porcine) 88926 units/250mL infusion PE/DVT/THROMBUS CONTINUOUS                Code Status: Not on file  Carl: No urinary catheter in place  Carl Duration (in days):   Central line:    AMADO:     Discharge is dependent on: Clinical improvement   At this point Ms. Wolf is expected to be discharge to: Home     The 21st Century Cures Act makes medical notes like these available to patients in the interest of transparency. Please be advised this is a medical document. Medical documents are intended to carry relevant information, facts as evident, and the clinical opinion of the practitioner. The medical note is intended as peer to peer communication and may appear blunt or direct. It is written in medical language and may contain abbreviations or verbiage that are unfamiliar.

## 2024-11-15 NOTE — PLAN OF CARE
A/O x 4. Seizure precautions, Need EEG Friday AM  Neurology and hematology to do formal eval Friday  Room air  Tele NSR/ SB, had 6 beats of MD nallely aware, will check mag and K in AM  Echo pending  Heparin drip for PE protocol, adjusted per nursing heparin titration  Denies pain  Cardiac diet  Standby to restroom, no dizziness, but did have syncopal episode at home PTA  All needs met at this time    Problem: CARDIOVASCULAR - ADULT  Goal: Maintains optimal cardiac output and hemodynamic stability  Description: INTERVENTIONS:  - Monitor vital signs, rhythm, and trends  - Monitor for bleeding, hypotension and signs of decreased cardiac output  - Evaluate effectiveness of vasoactive medications to optimize hemodynamic stability  - Monitor arterial and/or venous puncture sites for bleeding and/or hematoma  - Assess quality of pulses, skin color and temperature  - Assess for signs of decreased coronary artery perfusion - ex. Angina  - Evaluate fluid balance, assess for edema, trend weights  Outcome: Progressing  Goal: Absence of cardiac arrhythmias or at baseline  Description: INTERVENTIONS:  - Continuous cardiac monitoring, monitor vital signs, obtain 12 lead EKG if indicated  - Evaluate effectiveness of antiarrhythmic and heart rate control medications as ordered  - Initiate emergency measures for life threatening arrhythmias  - Monitor electrolytes and administer replacement therapy as ordered  Outcome: Progressing     Problem: METABOLIC/FLUID AND ELECTROLYTES - ADULT  Goal: Glucose maintained within prescribed range  Description: INTERVENTIONS:  - Monitor Blood Glucose as ordered  - Assess for signs and symptoms of hyperglycemia and hypoglycemia  - Administer ordered medications to maintain glucose within target range  - Assess barriers to adequate nutritional intake and initiate nutrition consult as needed  - Instruct patient on self management of diabetes  Outcome: Progressing  Goal: Electrolytes maintained within  normal limits  Description: INTERVENTIONS:  - Monitor labs and rhythm and assess patient for signs and symptoms of electrolyte imbalances  - Administer electrolyte replacement as ordered  - Monitor response to electrolyte replacements, including rhythm and repeat lab results as appropriate  - Fluid restriction as ordered  - Instruct patient on fluid and nutrition restrictions as appropriate  Outcome: Progressing  Goal: Hemodynamic stability and optimal renal function maintained  Description: INTERVENTIONS:  - Monitor labs and assess for signs and symptoms of volume excess or deficit  - Monitor intake, output and patient weight  - Monitor urine specific gravity, serum osmolarity and serum sodium as indicated or ordered  - Monitor response to interventions for patient's volume status, including labs, urine output, blood pressure (other measures as available)  - Encourage oral intake as appropriate  - Instruct patient on fluid and nutrition restrictions as appropriate  Outcome: Progressing     Problem: NEUROLOGICAL - ADULT  Goal: Achieves stable or improved neurological status  Description: INTERVENTIONS  - Assess for and report changes in neurological status  - Initiate measures to prevent increased intracranial pressure  - Maintain blood pressure and fluid volume within ordered parameters to optimize cerebral perfusion and minimize risk of hemorrhage  - Monitor temperature, glucose, and sodium. Initiate appropriate interventions as ordered  Outcome: Progressing  Goal: Absence of seizures  Description: INTERVENTIONS  - Monitor for seizure activity  - Administer anti-seizure medications as ordered  - Monitor neurological status  Outcome: Progressing  Goal: Remains free of injury related to seizure activity  Description: INTERVENTIONS:  - Maintain airway, patient safety  and administer oxygen as ordered  - Monitor patient for seizure activity, document and report duration and description of seizure to MD/LIP  - If  seizure occurs, turn patient to side and suction secretions as needed  - Reorient patient post seizure  - Seizure pads on all 4 side rails  - Instruct patient/family to notify RN of any seizure activity  - Instruct patient/family to call for assistance with activity based on assessment  Outcome: Progressing  Goal: Achieves maximal functionality and self care  Description: INTERVENTIONS  - Monitor swallowing and airway patency with patient fatigue and changes in neurological status  - Encourage and assist patient to increase activity and self care with guidance from PT/OT  - Encourage visually impaired, hearing impaired and aphasic patients to use assistive/communication devices  Outcome: Progressing

## 2024-11-15 NOTE — PHYSICAL THERAPY NOTE
Reviewed pt's chart and discuss pt with RN. At this time pt is was found with BLE PE. Pt is undergoing heparin that started 11/13 19:28. PT will re-attempt eval after 24hrs per rehab policy.

## 2024-11-15 NOTE — CM/SW NOTE
SW received order to price check Eliquis. Chart reviewed and noted script was sent to [facility] in [place].     ERASMO called [facility] ([phone]) x2 and was directed by automated menu to leave a VM for pharmacy staff. This writer was unable to speak directly with pharmacy staff.     SW left VM for pharmacy staff requesting call back for Eliquis pricing. Per automated menu, a call back should be received within 1 hour.     Await call back from [facility] for Eliquis price. ERASMO will continue to follow.     Addendum (3:30pm) - ERASMO did not receive call back from [facility]. ERASMO called [facility] ([phone]) and spoke with pharmacist who stated cost for Eliquis is $45.     ERASMO met with pt at bedside and updated her on above information. ERASMO provided pt with Eliquis 30 day coupon and $10 co-pay coupon. Pt denied any further needs at this time. ERASMO will continue to remain available.     TEO Manzo  Discharge Planner

## 2024-11-15 NOTE — OCCUPATIONAL THERAPY NOTE
OT orders received, pt chart reviewed. Discussed pt with RN. At this time, pt found with BLE PE. Pt is undergoing heparin that started 11/14 19:28. OT will re-attempt eval after 24hrs per rehab policy.

## 2024-11-16 VITALS
WEIGHT: 157.88 LBS | HEART RATE: 68 BPM | DIASTOLIC BLOOD PRESSURE: 69 MMHG | TEMPERATURE: 98 F | BODY MASS INDEX: 25.37 KG/M2 | SYSTOLIC BLOOD PRESSURE: 142 MMHG | OXYGEN SATURATION: 98 % | RESPIRATION RATE: 15 BRPM | HEIGHT: 66 IN

## 2024-11-16 LAB — APTT PPP: 67.8 SECONDS (ref 23–36)

## 2024-11-16 PROCEDURE — 99233 SBSQ HOSP IP/OBS HIGH 50: CPT | Performed by: INTERNAL MEDICINE

## 2024-11-16 PROCEDURE — 99239 HOSP IP/OBS DSCHRG MGMT >30: CPT | Performed by: STUDENT IN AN ORGANIZED HEALTH CARE EDUCATION/TRAINING PROGRAM

## 2024-11-16 NOTE — PROGRESS NOTES
[facility]   part of [facility]    Progress Note    Alexa Wolf Patient Status:  Inpatient    11/15/1960 MRN [id number]   Location [facility] 3NE-A Attending Donna Pennington DO   Hosp Day # 2 PCP Madai Stephens DO     Subjective:  Alexa Wolf is a(n) 64 year old female admitted with syncope and found to have small PEs. Tolerating heparin and cleared for diischarge byt the other services.         Objective:  Blood pressure 130/62, pulse 50, temperature 97.9 °F (36.6 °C), temperature source Oral, resp. rate 16, height 1.676 m (5' 6\"), weight 71.6 kg (157 lb 13.6 oz), SpO2 96%.    Labs:   Lab Results   Component Value Date    PTT 67.8 2024    B12 811 11/15/2024       Physical Exam:  General: afebrile, alert and oriented x 3, pleasant  CV: Regular rate and rhythm, no chest pain  Lungs: CTA  Abdomen: soft, nontender, positive BS  Extremity: pulses positive, no edema    Imaging:      Medications reviewed.    Assessment and Plan:  Patient Active Problem List   Diagnosis    Acute sinusitis, unspecified    Allergy, unspecified not elsewhere classified    Chest pain, unspecified    Acute pharyngitis    Anxiety    Obesity    Crohn disease (HCC)    Crohn's disease of both small and large intestine without complication (HCC)    Migraine without aura and without status migrainosus, not intractable    Tension headache    Overweight (BMI 25.0-29.9)    Iron deficiency anemia, unspecified    Crohn's disease of small intestine (HCC)    Varicose veins of both lower extremities with pain    Acquired hypothyroidism    Acute pulmonary embolism without acute cor pulmonale, unspecified pulmonary embolism type (HCC)    Syncope    Gait instability    Seizure (HCC)    Acute nonintractable headache, unspecified headache type    Dizziness     Syncope: EEG negative. Cleared for discharge by neurology.     PE: Tolerating anticoagulation. Transition to Eliquis.  OK for discharge from the hematology  perspective.     Jean Paul Villarreal MD  11/16/2024  10:06 AM

## 2024-11-16 NOTE — PLAN OF CARE
Assumed patient care around 1930  A&O x4  RA  SB on tele  Continent, +1 standby assist  Cardiac diet  Patient denies pain  PIV R AC saline locked  PIV R. Forearm infusing heparin gtt 6mL/hr  Safety precautions in place, bed in lowest position, call light within reach, updated on plan of care, all needs met at this time.      Problem: CARDIOVASCULAR - ADULT  Goal: Maintains optimal cardiac output and hemodynamic stability  Description: INTERVENTIONS:  - Monitor vital signs, rhythm, and trends  - Monitor for bleeding, hypotension and signs of decreased cardiac output  - Evaluate effectiveness of vasoactive medications to optimize hemodynamic stability  - Monitor arterial and/or venous puncture sites for bleeding and/or hematoma  - Assess quality of pulses, skin color and temperature  - Assess for signs of decreased coronary artery perfusion - ex. Angina  - Evaluate fluid balance, assess for edema, trend weights  Outcome: Progressing  Goal: Absence of cardiac arrhythmias or at baseline  Description: INTERVENTIONS:  - Continuous cardiac monitoring, monitor vital signs, obtain 12 lead EKG if indicated  - Evaluate effectiveness of antiarrhythmic and heart rate control medications as ordered  - Initiate emergency measures for life threatening arrhythmias  - Monitor electrolytes and administer replacement therapy as ordered  Outcome: Progressing     Problem: METABOLIC/FLUID AND ELECTROLYTES - ADULT  Goal: Glucose maintained within prescribed range  Description: INTERVENTIONS:  - Monitor Blood Glucose as ordered  - Assess for signs and symptoms of hyperglycemia and hypoglycemia  - Administer ordered medications to maintain glucose within target range  - Assess barriers to adequate nutritional intake and initiate nutrition consult as needed  - Instruct patient on self management of diabetes  Outcome: Progressing  Goal: Electrolytes maintained within normal limits  Description: INTERVENTIONS:  - Monitor labs and rhythm and  assess patient for signs and symptoms of electrolyte imbalances  - Administer electrolyte replacement as ordered  - Monitor response to electrolyte replacements, including rhythm and repeat lab results as appropriate  - Fluid restriction as ordered  - Instruct patient on fluid and nutrition restrictions as appropriate  Outcome: Progressing  Goal: Hemodynamic stability and optimal renal function maintained  Description: INTERVENTIONS:  - Monitor labs and assess for signs and symptoms of volume excess or deficit  - Monitor intake, output and patient weight  - Monitor urine specific gravity, serum osmolarity and serum sodium as indicated or ordered  - Monitor response to interventions for patient's volume status, including labs, urine output, blood pressure (other measures as available)  - Encourage oral intake as appropriate  - Instruct patient on fluid and nutrition restrictions as appropriate  Outcome: Progressing     Problem: NEUROLOGICAL - ADULT  Goal: Achieves stable or improved neurological status  Description: INTERVENTIONS  - Assess for and report changes in neurological status  - Initiate measures to prevent increased intracranial pressure  - Maintain blood pressure and fluid volume within ordered parameters to optimize cerebral perfusion and minimize risk of hemorrhage  - Monitor temperature, glucose, and sodium. Initiate appropriate interventions as ordered  Outcome: Progressing  Goal: Absence of seizures  Description: INTERVENTIONS  - Monitor for seizure activity  - Administer anti-seizure medications as ordered  - Monitor neurological status  Outcome: Progressing  Goal: Remains free of injury related to seizure activity  Description: INTERVENTIONS:  - Maintain airway, patient safety  and administer oxygen as ordered  - Monitor patient for seizure activity, document and report duration and description of seizure to MD/LIP  - If seizure occurs, turn patient to side and suction secretions as needed  -  Reorient patient post seizure  - Seizure pads on all 4 side rails  - Instruct patient/family to notify RN of any seizure activity  - Instruct patient/family to call for assistance with activity based on assessment  Outcome: Progressing  Goal: Achieves maximal functionality and self care  Description: INTERVENTIONS  - Monitor swallowing and airway patency with patient fatigue and changes in neurological status  - Encourage and assist patient to increase activity and self care with guidance from PT/OT  - Encourage visually impaired, hearing impaired and aphasic patients to use assistive/communication devices  Outcome: Progressing

## 2024-11-16 NOTE — PLAN OF CARE
NURSING DISCHARGE NOTE    Discharged Home via Wheelchair.  Accompanied by Support staff  Belongings Taken by patient/family.     Problem: CARDIOVASCULAR - ADULT  Goal: Maintains optimal cardiac output and hemodynamic stability  Description: INTERVENTIONS:  - Monitor vital signs, rhythm, and trends  - Monitor for bleeding, hypotension and signs of decreased cardiac output  - Evaluate effectiveness of vasoactive medications to optimize hemodynamic stability  - Monitor arterial and/or venous puncture sites for bleeding and/or hematoma  - Assess quality of pulses, skin color and temperature  - Assess for signs of decreased coronary artery perfusion - ex. Angina  - Evaluate fluid balance, assess for edema, trend weights  Outcome: Progressing  Goal: Absence of cardiac arrhythmias or at baseline  Description: INTERVENTIONS:  - Continuous cardiac monitoring, monitor vital signs, obtain 12 lead EKG if indicated  - Evaluate effectiveness of antiarrhythmic and heart rate control medications as ordered  - Initiate emergency measures for life threatening arrhythmias  - Monitor electrolytes and administer replacement therapy as ordered  Outcome: Progressing     Problem: METABOLIC/FLUID AND ELECTROLYTES - ADULT  Goal: Glucose maintained within prescribed range  Description: INTERVENTIONS:  - Monitor Blood Glucose as ordered  - Assess for signs and symptoms of hyperglycemia and hypoglycemia  - Administer ordered medications to maintain glucose within target range  - Assess barriers to adequate nutritional intake and initiate nutrition consult as needed  - Instruct patient on self management of diabetes  Outcome: Progressing  Goal: Electrolytes maintained within normal limits  Description: INTERVENTIONS:  - Monitor labs and rhythm and assess patient for signs and symptoms of electrolyte imbalances  - Administer electrolyte replacement as ordered  - Monitor response to electrolyte replacements, including rhythm and repeat lab  results as appropriate  - Fluid restriction as ordered  - Instruct patient on fluid and nutrition restrictions as appropriate  Outcome: Progressing  Goal: Hemodynamic stability and optimal renal function maintained  Description: INTERVENTIONS:  - Monitor labs and assess for signs and symptoms of volume excess or deficit  - Monitor intake, output and patient weight  - Monitor urine specific gravity, serum osmolarity and serum sodium as indicated or ordered  - Monitor response to interventions for patient's volume status, including labs, urine output, blood pressure (other measures as available)  - Encourage oral intake as appropriate  - Instruct patient on fluid and nutrition restrictions as appropriate  Outcome: Progressing     Problem: NEUROLOGICAL - ADULT  Goal: Achieves stable or improved neurological status  Description: INTERVENTIONS  - Assess for and report changes in neurological status  - Initiate measures to prevent increased intracranial pressure  - Maintain blood pressure and fluid volume within ordered parameters to optimize cerebral perfusion and minimize risk of hemorrhage  - Monitor temperature, glucose, and sodium. Initiate appropriate interventions as ordered  Outcome: Progressing  Goal: Absence of seizures  Description: INTERVENTIONS  - Monitor for seizure activity  - Administer anti-seizure medications as ordered  - Monitor neurological status  Outcome: Progressing  Goal: Remains free of injury related to seizure activity  Description: INTERVENTIONS:  - Maintain airway, patient safety  and administer oxygen as ordered  - Monitor patient for seizure activity, document and report duration and description of seizure to MD/LIP  - If seizure occurs, turn patient to side and suction secretions as needed  - Reorient patient post seizure  - Seizure pads on all 4 side rails  - Instruct patient/family to notify RN of any seizure activity  - Instruct patient/family to call for assistance with activity based  on assessment  Outcome: Progressing  Goal: Achieves maximal functionality and self care  Description: INTERVENTIONS  - Monitor swallowing and airway patency with patient fatigue and changes in neurological status  - Encourage and assist patient to increase activity and self care with guidance from PT/OT  - Encourage visually impaired, hearing impaired and aphasic patients to use assistive/communication devices  Outcome: Progressing     Problem: NEUROLOGICAL - ADULT  Goal: Achieves stable or improved neurological status  Description: INTERVENTIONS  - Assess for and report changes in neurological status  - Initiate measures to prevent increased intracranial pressure  - Maintain blood pressure and fluid volume within ordered parameters to optimize cerebral perfusion and minimize risk of hemorrhage  - Monitor temperature, glucose, and sodium. Initiate appropriate interventions as ordered  Outcome: Progressing  Goal: Absence of seizures  Description: INTERVENTIONS  - Monitor for seizure activity  - Administer anti-seizure medications as ordered  - Monitor neurological status  Outcome: Progressing  Goal: Remains free of injury related to seizure activity  Description: INTERVENTIONS:  - Maintain airway, patient safety  and administer oxygen as ordered  - Monitor patient for seizure activity, document and report duration and description of seizure to MD/LIP  - If seizure occurs, turn patient to side and suction secretions as needed  - Reorient patient post seizure  - Seizure pads on all 4 side rails  - Instruct patient/family to notify RN of any seizure activity  - Instruct patient/family to call for assistance with activity based on assessment  Outcome: Progressing  Goal: Achieves maximal functionality and self care  Description: INTERVENTIONS  - Monitor swallowing and airway patency with patient fatigue and changes in neurological status  - Encourage and assist patient to increase activity and self care with guidance from  PT/OT  - Encourage visually impaired, hearing impaired and aphasic patients to use assistive/communication devices  Outcome: Progressing

## 2024-11-16 NOTE — OCCUPATIONAL THERAPY NOTE
OT orders received, chart reviewed. Per RN, pt is discharging soon. Discussed role of therapy with pt. Pt states she has been getting up and going to the bathroom without difficulty. She reports no concerns RE ADL or mobility at this time. Orders completed.

## 2024-11-16 NOTE — DISCHARGE SUMMARY
[facility]     Alexa Wolf Patient Status:  Inpatient    11/15/1960 MRN [id number]   Location [facility] 3NE-A Attending No att. providers found   Hosp Day # 2 PCP Madai Stephens DO     Date of Admission: 2024  Date of Discharge:  2024     Discharge Disposition: Home or Self Care    Discharge Diagnosis:  #Syncope  #Gait instability  #Right pulmonary emboli  #Hypothyroidism  #Crohn's disease    History of Present Illness: Alexa Wolf is a 63 year old female with past medical history of hypothyroidism, Crohn's disease who presents ED for syncope.  Last Friday  morning, patient stood up to go to the bathroom and felt lightheaded.  She felt like everything was moving around her.  She then awoke on the ground and found that she had called her self.  She had gait instability afterwards.  Since the event she has been experiencing a posterior headache.  Fall was unwitnessed and it is unclear if she had any convulsions.  She not bite her tongue.  She estimates she may have been unconscious for several minutes.  She denies any blurred vision, chest pain, dyspnea, nausea, vomiting, weakness.      Brief Synopsis: Patient was found to have right pulmonary emboli and started on heparin drip.  Hematology was consulted.  CTA brain and carotids showed no acute abnormalities.  EEG was unremarkable.  Neuro recommended no additional workup.  Lower extremity Doppler ultrasound showed no DVT.  Patient was transition to DOAC on discharge.  Patient was discharged home with outpatient follow-up.    Lace+ Score: 52  59-90 High Risk  29-58 Medium Risk  0-28   Low Risk       TCM Follow-Up Recommendation:  LACE 29-58: Moderate Risk of readmission after discharge from the hospital.      Procedures during hospitalization:   None    Incidental or significant findings and recommendations (brief descriptions):  See brief synopsis above    Lab/Test results pending at Discharge:    None    Consultants:  Hematology  Neurology    Discharge Medication List:     Discharge Medications        START taking these medications        Instructions Prescription details   apixaban 5 MG Tabs  Commonly known as: Eliquis      Take 2 tabs (10mg) by mouth twice daily for 7 days, then take 1 tab (5mg) by mouth twice daily thereafter.   Quantity: 74 tablet  Refills: 0            CONTINUE taking these medications        Instructions Prescription details   CALCIUM + D3 OR      Take 1 tablet by mouth daily.   Refills: 0     levothyroxine 25 MCG Tabs  Commonly known as: Synthroid      Take 1 tablet (25 mcg total) by mouth before breakfast.   Quantity: 90 tablet  Refills: 1     MULTIPLE VITAMIN OR      Take 1 tablet by mouth daily.   Refills: 0     Stelara 90 MG/ML Sosy injection  Generic drug: ustekinumab      MAINTENANCE: INJECT 1 SYRINGE SUBCUTANEOUSLY EVERY 8 WEEKS. REFRIGERATE. DO NOT FREEZE.   Quantity: 2 each  Refills: 6     Vitamin B 12 500 MCG Tabs      Take 1,000 mcg by mouth daily.   Quantity: 30 tablet  Refills: 0               Where to Get Your Medications        These medications were sent to [facility]/pharmacy #[id number] - [place] [address] AT [facility], [phone], [phone]  [address], [facility] [address]      Phone: [phone]   apixaban 5 MG Tabs         [place]    Follow-up appointment:   Jeison Vallecillo MD  [address] [place] [address]  [phone]    Follow up in 1 month(s)      Maadi Stephens DO  [address]  [place] [address]  [phone]    Follow up in 1 week(s)      Eliazar Villegas MD  [address]  [facility] [address]  [place] [address]  [phone]    Follow up in 1 month(s)      Appointments for Next 30 Days 11/17/2024 - 12/17/2024      None            Vital signs:       Physical Exam:    General: No acute distress   Lungs: clear to auscultation  Cardiovascular: S1, S2  Abdomen:  Soft      -----------------------------------------------------------------------------------------------  PATIENT DISCHARGE INSTRUCTIONS: See electronic chart    Donna Pennington,     Total time spent on discharge plannin minutes     The  Century Cures Act makes medical notes like these available to patients in the interest of transparency. Please be advised this is a medical document. Medical documents are intended to carry relevant information, facts as evident, and the clinical opinion of the practitioner. The medical note is intended as peer to peer communication and may appear blunt or direct. It is written in medical language and may contain abbreviations or verbiage that are unfamiliar.

## 2024-11-16 NOTE — PLAN OF CARE
Assumed care of pt at 0730 hr- pt is a&0x4, endorses headache pain, on RA, continuours cardiac and Spo2 monitoring is in place, PIV is infusing ordered heparin gtt, up with SBA fro safety. Safety precautions are in place.    seizure precautions in effect

## 2024-11-16 NOTE — PLAN OF CARE
Patient given order to discharge home. This RN discussed with patient at bedside the following discharge instructions including: follow up care, new Rxs for patient to fill at own pharmacy, home medications to continue, home care, and s/s of when to return to the ED/call 911- patient verbalized understanding of all instruction. Patient's IV and cardiac monitor were removed from the patient. Patient was transported in stable condition to private vehicle at [facility].

## 2024-11-16 NOTE — PROGRESS NOTES
[facility]   part of [facility]     Hospitalist Progress Note     Alexa Wolf Patient Status:  Inpatient    11/15/1960 MRN [id number]   Location [facility] 3NE-A Attending Donna Pennington DO   Hosp Day # 2 PCP Madai Stephens DO     Chief Complaint: Syncope    Subjective:     Patient resting in bed and feels well     Objective:    Review of Systems:   A comprehensive review of systems was completed; pertinent positive and negatives stated in subjective.    Vital signs:  Temp:  [97.6 °F (36.4 °C)-98.5 °F (36.9 °C)] 97.9 °F (36.6 °C)  Pulse:  [50-66] 50  Resp:  [16] 16  BP: (110-139)/(57-91) 130/62  SpO2:  [94 %-99 %] 96 %    Physical Exam:    General: No acute distress  Respiratory: No wheezes, no rhonchi  Cardiovascular: S1, S2, regular rate and rhythm  Abdomen: Soft, Non-tender, non-distended, positive bowel sounds  Neuro: No new focal deficits.   Extremities: No edema    Diagnostic Data:    Labs:  Recent Labs   Lab 24  1107 11/15/24  0308   WBC 6.3 7.4   HGB 12.6 11.6*   MCV 92.3 89.8   .0 213.0       Recent Labs   Lab 24  1107 11/15/24  0308   GLU 87 93   BUN 8* 7*   CREATSERUM 0.79 0.70   CA 9.4 9.2   ALB 4.5 3.9   * 136   K 4.2 4.0    105   CO2 27.0 26.0   ALKPHO 72 65   AST 24 21   ALT 17 15   BILT 0.6 0.4   TP 7.5 6.5       Estimated Creatinine Clearance: 76 mL/min (based on SCr of 0.7 mg/dL).    Recent Labs   Lab 24  1107   TROPHS 4       Recent Labs   Lab 11/15/24  1019   PTP 13.7                  Microbiology    No results found for this visit on 24.      Imaging: Reviewed in Epic.    Medications:    apixaban  10 mg Oral BID    Followed by    [START ON 2024] apixaban  5 mg Oral BID    sodium ferric gluconate  125 mg Intravenous Daily    levothyroxine  25 mcg Oral Before breakfast       Assessment & Plan:      #Syncope  #Gait instability  #Possible seizure  -CTA brain/carotids showed no acute abnormalities  -EEG unremarkable   -Neuro  following      #Right pulmonary emboli  -CT chest reviewed  -Echo reviewed and shows EF 60-65%, no RWMA  -LE doppler US shows no DVT    -Heparin drip  -Hematology following      #Hypothyroidism  -Levothyroxine     #Crohn's disease  -On Crispin Pennington DO    Supplementary Documentation:     Quality:  DVT Mechanical Prophylaxis:   SCDs,    DVT Pharmacologic Prophylaxis   Medication    apixaban (Eliquis) tab 10 mg    In followed-by linked group with    [START ON 11/23/2024] apixaban (Eliquis) tab 5 mg                Code Status: Not on file  Carl: No urinary catheter in place  Carl Duration (in days):   Central line:    AMADO: 11/16/2024    Discharge is dependent on: Clinical improvement   At this point Ms. Wolf is expected to be discharge to: Home     The 21st Century Cures Act makes medical notes like these available to patients in the interest of transparency. Please be advised this is a medical document. Medical documents are intended to carry relevant information, facts as evident, and the clinical opinion of the practitioner. The medical note is intended as peer to peer communication and may appear blunt or direct. It is written in medical language and may contain abbreviations or verbiage that are unfamiliar.

## 2024-11-17 LAB
APTT PPP: 151.4 SECONDS (ref 23–36)
INR BLD: 1.04 (ref 0.85–1.16)
LA 3 SCREEN W REFLEX-IMP: POSITIVE
PROTHROMBIN TIME: 13.7 SECONDS (ref 11.6–14.8)
PTT (HEPZYME): 45.7 SECONDS (ref 23–36)
SCREEN DRVVT: 1.26 S (ref 0–1.29)
SCREEN DRVVT: NEGATIVE S
STACLOT LA DELTA: 16 SECONDS (ref ?–8)

## 2024-11-18 ENCOUNTER — PATIENT OUTREACH (OUTPATIENT)
Dept: CASE MANAGEMENT | Age: 64
End: 2024-11-18

## 2024-11-18 ENCOUNTER — TELEPHONE (OUTPATIENT)
Dept: NEUROLOGY | Facility: CLINIC | Age: 64
End: 2024-11-18

## 2024-11-18 DIAGNOSIS — I26.99 ACUTE PULMONARY EMBOLISM WITHOUT ACUTE COR PULMONALE, UNSPECIFIED PULMONARY EMBOLISM TYPE (HCC): ICD-10-CM

## 2024-11-18 DIAGNOSIS — Z02.9 ENCOUNTERS FOR UNSPECIFIED ADMINISTRATIVE PURPOSE: Primary | ICD-10-CM

## 2024-11-18 LAB
B2 GLYCOPROT1 IGG SERPL IA-ACNC: 1 U/ML (ref ?–7)
B2 GLYCOPROT1 IGM SERPL IA-ACNC: <2.4 U/ML (ref ?–7)
CARDIOLIPIN IGG SERPL-ACNC: 2.9 GPL (ref ?–10)
CARDIOLIPIN IGM SERPL-ACNC: 2.4 MPL (ref ?–10)

## 2024-11-18 NOTE — TELEPHONE ENCOUNTER
Patient was in the hospital and was seen By Dr. Vallecillo and was told to follow up in one month, her appointment is on 01/03/25 is that ok to wait until then?

## 2024-11-18 NOTE — PROGRESS NOTES
Transitional Care Management   Discharge Date: 24  Contact Date: 2024    Assessment:    TCM Initial Assessment    General:  Assessment completed with: Patient  Patient Subjective: Pt reports she is tired but overall feels good. Pt denies confusion, weakness, dizziness, feeling faint, HA, hematuria, blood in the stool, chest pain, shortness of breath, abdominal pain and fevers. Pt is eating and drinking, denies n/v/d. Pt has a good appetite. Pt did start the new medication, Eliquis. Pt is ambulating well. Pt has no concerns at this time.  Chief Complaint: Syncope  Gait instability  Right pulmonary emboli  Verify patient name and  with patient/ caregiver: Yes    Hospital Stay/Discharge:  Tell me what you understand of why you were in the hospital or emergency department: feeling lightheaded  Prior to leaving the hospital were your Discharge Instructions reviewed with you?: Yes  Did you receive a copy of your written Discharge Instructions?: Yes  What questions do you have about your Discharge Instructions?: None at this time  Do you feel better or worse since you left the hospital or emergency department?: Better    Follow - Up Appointment:  Do you have a follow-up appointment?: Yes  Date: 24  Physician: PCP  Are there any barriers to getting to your follow-up appointment?: No    Home Health/DME:  Prior to leaving the hospital was Home Health (HH) arranged for you?: No     Prior to leaving the hospital or emergency department was Durable Medical Equipment (DME), medical supplies, or infusions arranged for you?: No  Are DME/medical supply/infusions needs identified by staff during this assessment?: No     Medications/Diet:  Did any of your medications change, during or after your hospital stay or ED visit?: Yes  Do you have your new or updated medications?: Yes  Do you understand what your medications are for and possible side effects?: Yes  Are there any reasons that keep you from taking your  medication as prescribed?: No  Any concerns about medication refills?: No    Were you given a different diet per your Discharge Instructions?: No     Questions/Concerns:  Do you have any questions or concerns that have not been discussed?: No     Nursing Interventions: All d/c instructions reviewed with the pt. Reviewed when to call MD vs when to call 911 or go the ED. Discussed s/s of PE and DVT. Reviewed bleeding precautions. Educated pt on the importance of taking all meds as prescribed as well as close f/u with PCP/specialists. Pt verbalized understanding and will contact the office with any further questions or concerns.       Medication Review: Reviewed medication list with the patient. Medications are up to date. Pt is aware to avoid NSAIDS.   Current Outpatient Medications   Medication Sig Dispense Refill    apixaban 5 MG Oral Tab Take 2 tabs (10mg) by mouth twice daily for 7 days, then take 1 tab (5mg) by mouth twice daily thereafter. 74 tablet 0    CALCIUM + D3 OR Take 1 tablet by mouth daily.      levothyroxine 25 MCG Oral Tab Take 1 tablet (25 mcg total) by mouth before breakfast. 90 tablet 1    ustekinumab (STELARA) 90 MG/ML Subcutaneous Solution Prefilled Syringe injection MAINTENANCE: INJECT 1 SYRINGE SUBCUTANEOUSLY EVERY 8 WEEKS. REFRIGERATE. DO NOT FREEZE. 2 each 6    Cyanocobalamin (VITAMIN B 12) 500 MCG Oral Tab Take 1,000 mcg by mouth daily. 30 tablet 0    MULTIPLE VITAMIN OR Take 1 tablet by mouth daily.       Did patient review medications using current pill bottles and not just a medication list?  No  Discharge medications reviewed/discussed/and reconciled against outpatient medications with patient.  Any changes or updates to medications sent to primary care provider.  Patient Acknowledged    SDOH:     Transportation Needs: No Transportation Needs (11/18/2024)    Transportation Needs     Lack of Transportation: No     Car Seat: Not on file       Financial Resource Strain: Low Risk   (11/18/2024)    Financial Resource Strain     Difficulty of Paying Living Expenses: Not very hard     Med Affordability: No        If yes- Housing: .[facility]      Follow-up Appointments: Reviewed recommended follow up appointments. Pt denies any barriers to keeping/getting to HFU appts.   Your appointments       Date & Time Appointment Department (Center)    Nov 20, 2024 9:30 AM CST Hospital Follow Up with Madai Stephens DO [facility] ([facility])        Dec 16, 2024 11:30 AM CST Follow-Up Visit with Eliazar Villegas MD [facility] [place] ([facility])        Jan 03, 2025 9:20 AM CST Follow Up Visit with Jeison Vallecillo MD [facility] ([facility])        Alvin 10, 2025 11:00 AM CST Injection with EMG 11 NURSE [facility] ([facility])        Jun 13, 2025 1:00 PM CDT Physical - Established with Madai Stephens DO [facility] ([facility])              [facility]  [address] [place] [address]  [phone] [facility]  [address]  [facility] [address]  [phone] [facility]  [address]  [facility] [id number] [phone]            Transitional Care Clinic  Was TCC Ordered: No      Primary Care Provider (If no TCC appointment)  Does patient already have a PCP appointment scheduled? Yes  Nurse Care Manager Confirmed PCP office TCM appointment with patient on 11/20/24    Specialist  Does the patient have any other follow-up appointment(s) that need to be scheduled? Yes   -If yes: Nurse Care Manager reviewed upcoming  specialist appointments with patient: Yes   -Does the patient need assistance scheduling appointment(s): No- Pt has an appt with Hem/Onc scheduled. Pt declined assistance scheduling with specialists. Pt confirmed she has contact information.     Follow up with Jeison Vallecillo MD (NEUROLOGY) in 1 month (12/16/2024)  Follow up with Eliazar Villegas MD (Hematology and Oncology) in 1 month (12/16/2024)     CCM referral placed:  Not Applicable    Book By Date: 11/30/24

## 2024-11-18 NOTE — TELEPHONE ENCOUNTER
Patient new to Dr Vallecillo, was seen inpatient on 11/16/2024. Was to follow up in one month, but first available was on January 3, 2025 which she took.    Spoke to patient to inform that she may get a call or MyChart message with a sooner available appointment that she can take advantage of.   Verbalized understanding.

## 2024-11-19 NOTE — PAYOR COMM NOTE
--------------  DISCHARGE REVIEW    Payor: [facility]/HMO/POS/EPO  Subscriber #:  [id number]  Authorization Number: [id number]    Admit date: 24  Admit time:   3:55 PM  Discharge Date: 2024  1:30 PM     Admitting Physician: Donna Pennington DO  Attending Physician:  No att. providers found  Primary Care Physician: Madai Stephens DO          Discharge Summary Notes        Discharge Summary signed by Donna Pennington DO at 2024 11:52 AM       Author: Donna Pennington DO Specialty: HOSPITALIST Author Type: Physician    Filed: 2024 11:52 AM Date of Service: 2024  9:28 AM Status: Signed    : Donna Pennington DO (Physician)           [facility]     Alexa Wolf Patient Status:  Inpatient    11/15/1960 MRN [id number]   [facility] 3NE-A Attending No att. providers found   Hosp Day # 2 PCP Madai Stephens DO     Date of Admission: 2024  Date of Discharge:  2024     Discharge Disposition: Home or Self Care    Discharge Diagnosis:  #Syncope  #Gait instability  #Right pulmonary emboli  #Hypothyroidism  #Crohn's disease    History of Present Illness: Alexa Wolf is a 63 year old female with past medical history of hypothyroidism, Crohn's disease who presents ED for syncope.  Last Friday  morning, patient stood up to go to the bathroom and felt lightheaded.  She felt like everything was moving around her.  She then awoke on the ground and found that she had called her self.  She had gait instability afterwards.  Since the event she has been experiencing a posterior headache.  Fall was unwitnessed and it is unclear if she had any convulsions.  She not bite her tongue.  She estimates she may have been unconscious for several minutes.  She denies any blurred vision, chest pain, dyspnea, nausea, vomiting, weakness.      Brief Synopsis: Patient was found to have right pulmonary emboli and started on heparin drip.   Hematology was consulted.  CTA brain and carotids showed no acute abnormalities.  EEG was unremarkable.  Neuro recommended no additional workup.  Lower extremity Doppler ultrasound showed no DVT.  Patient was transition to DOAC on discharge.  Patient was discharged home with outpatient follow-up.    Lace+ Score: 52  59-90 High Risk  29-58 Medium Risk  0-28   Low Risk       TCM Follow-Up Recommendation:  LACE 29-58: Moderate Risk of readmission after discharge from the hospital.      Procedures during hospitalization:   None    Incidental or significant findings and recommendations (brief descriptions):  See brief synopsis above    Lab/Test results pending at Discharge:   None    Consultants:  Hematology  Neurology    Discharge Medication List:     Discharge Medications        START taking these medications        Instructions Prescription details   apixaban 5 MG Tabs  Commonly known as: Eliquis      Take 2 tabs (10mg) by mouth twice daily for 7 days, then take 1 tab (5mg) by mouth twice daily thereafter.   Quantity: 74 tablet  Refills: 0            CONTINUE taking these medications        Instructions Prescription details   CALCIUM + D3 OR      Take 1 tablet by mouth daily.   Refills: 0     levothyroxine 25 MCG Tabs  Commonly known as: Synthroid      Take 1 tablet (25 mcg total) by mouth before breakfast.   Quantity: 90 tablet  Refills: 1     MULTIPLE VITAMIN OR      Take 1 tablet by mouth daily.   Refills: 0     Stelara 90 MG/ML Sosy injection  Generic drug: ustekinumab      MAINTENANCE: INJECT 1 SYRINGE SUBCUTANEOUSLY EVERY 8 WEEKS. REFRIGERATE. DO NOT FREEZE.   Quantity: 2 each  Refills: 6     Vitamin B 12 500 MCG Tabs      Take 1,000 mcg by mouth daily.   Quantity: 30 tablet  Refills: 0               Where to Get Your Medications        These medications were sent to [facility]/pharmacy #[id number] - [place], [place] - [address] AT [facility], [phone], [phone] [address], [facility] [address]       Phone: [phone]   apixaban 5 MG Tabs         [place]    Follow-up appointment:   Jeison Vallecillo MD  [address] [facility]  [address]  [place] [address]  [phone]    Follow up in 1 month(s)      Madai Stephens DO  [address]  [place] [address]  [phone]    Follow up in 1 week(s)      Eliazar Villegas MD  [address]  [address]  [place] [address]  [phone]    Follow up in 1 month(s)      Appointments for Next 30 Days 2024 - 2024      None            Vital signs:       Physical Exam:    General: No acute distress   Lungs: clear to auscultation  Cardiovascular: S1, S2  Abdomen: Soft      -----------------------------------------------------------------------------------------------  PATIENT DISCHARGE INSTRUCTIONS: See electronic chart    Donna Pennington DO    Total time spent on discharge plannin minutes     The  Century Cures Act makes medical notes like these available to patients in the interest of transparency. Please be advised this is a medical document. Medical documents are intended to carry relevant information, facts as evident, and the clinical opinion of the practitioner. The medical note is intended as peer to peer communication and may appear blunt or direct. It is written in medical language and may contain abbreviations or verbiage that are unfamiliar.       Electronically signed by Donna Pennington DO on 2024 11:52 AM         REVIEWER COMMENTS

## 2024-11-20 ENCOUNTER — OFFICE VISIT (OUTPATIENT)
Dept: FAMILY MEDICINE CLINIC | Facility: CLINIC | Age: 64
End: 2024-11-20
Payer: COMMERCIAL

## 2024-11-20 ENCOUNTER — TELEPHONE (OUTPATIENT)
Dept: NEUROLOGY | Facility: CLINIC | Age: 64
End: 2024-11-20

## 2024-11-20 ENCOUNTER — HOSPITAL ENCOUNTER (OUTPATIENT)
Dept: MRI IMAGING | Age: 64
Discharge: HOME | End: 2024-11-20
Attending: FAMILY MEDICINE
Payer: COMMERCIAL

## 2024-11-20 VITALS
HEIGHT: 66 IN | SYSTOLIC BLOOD PRESSURE: 122 MMHG | HEART RATE: 52 BPM | RESPIRATION RATE: 18 BRPM | BODY MASS INDEX: 26.36 KG/M2 | DIASTOLIC BLOOD PRESSURE: 70 MMHG | OXYGEN SATURATION: 99 % | WEIGHT: 164 LBS

## 2024-11-20 DIAGNOSIS — E66.3 OVERWEIGHT (BMI 25.0-29.9): ICD-10-CM

## 2024-11-20 DIAGNOSIS — E03.9 ACQUIRED HYPOTHYROIDISM: ICD-10-CM

## 2024-11-20 DIAGNOSIS — R53.1 WEAKNESS: ICD-10-CM

## 2024-11-20 DIAGNOSIS — G43.009 MIGRAINE WITHOUT AURA AND WITHOUT STATUS MIGRAINOSUS, NOT INTRACTABLE: ICD-10-CM

## 2024-11-20 DIAGNOSIS — K50.80 CROHN'S DISEASE OF BOTH SMALL AND LARGE INTESTINE WITHOUT COMPLICATION (HCC): ICD-10-CM

## 2024-11-20 DIAGNOSIS — Z79.899 MEDICATION MANAGEMENT: ICD-10-CM

## 2024-11-20 DIAGNOSIS — I31.39 PERICARDIAL EFFUSION (HCC): ICD-10-CM

## 2024-11-20 DIAGNOSIS — K21.9 GASTROESOPHAGEAL REFLUX DISEASE, UNSPECIFIED WHETHER ESOPHAGITIS PRESENT: ICD-10-CM

## 2024-11-20 DIAGNOSIS — R55 SYNCOPE, UNSPECIFIED SYNCOPE TYPE: ICD-10-CM

## 2024-11-20 DIAGNOSIS — I77.3 FIBROMUSCULAR DYSPLASIA (HCC): ICD-10-CM

## 2024-11-20 DIAGNOSIS — R26.81 GAIT INSTABILITY: ICD-10-CM

## 2024-11-20 DIAGNOSIS — I26.99 PE (PULMONARY THROMBOEMBOLISM) (HCC): ICD-10-CM

## 2024-11-20 DIAGNOSIS — Z09 HOSPITAL DISCHARGE FOLLOW-UP: Primary | ICD-10-CM

## 2024-11-20 PROCEDURE — A9575 INJ GADOTERATE MEGLUMI 0.1ML: HCPCS | Performed by: FAMILY MEDICINE

## 2024-11-20 PROCEDURE — 70553 MRI BRAIN STEM W/O & W/DYE: CPT | Performed by: FAMILY MEDICINE

## 2024-11-20 RX ORDER — GADOTERATE MEGLUMINE 376.9 MG/ML
15 INJECTION INTRAVENOUS
Status: COMPLETED | OUTPATIENT
Start: 2024-11-20 | End: 2024-11-20

## 2024-11-20 RX ORDER — BUTALBITAL, ACETAMINOPHEN AND CAFFEINE 300; 40; 50 MG/1; MG/1; MG/1
1 CAPSULE ORAL EVERY 4 HOURS PRN
Qty: 30 CAPSULE | Refills: 0 | Status: SHIPPED | OUTPATIENT
Start: 2024-11-20

## 2024-11-20 RX ADMIN — GADOTERATE MEGLUMINE 15 ML: 376.9 INJECTION INTRAVENOUS at 21:33:00

## 2024-11-20 NOTE — PROGRESS NOTES
Subjective:   Alexa Wolf is a 64 year old female who presents for hospital follow up.   She was discharged from [facility] to Home or Self Care  Admission Date: 11/14/24   Discharge Date: 11/16/24  Hospital Discharge Diagnosis: syncope    Interactive contact within 2 business days post discharge first initiated on Date: 11/18/2024    I accessed [facility] and/or [facility] Everywhere and personally reviewed the following for the recent hospitalization: provider notes, consults, summaries, labs and other test results and the pertinent findings are documented below.     HPI: Pt. States she got up to go to the bathroom and passed out and urinated on herself  -- that happened on Friday, 11/8/24,  and went to the ER on 11/14/24 on a Thursday.  Saw Dr. Vallecillo in the hospital and will follow up with him.  Had PE.  On eliquis.  Per Dr. Villegas.  12/16/24  Feels like head is in the cloud.  Had migraine in the hospital and given fioricet which helped a lot and would like a refill.  Feels mildly off balance.  Feels she is swaying mildly and does not feel steady on her feet since the incident.  No N/V.  No headache.  Notes her thoughts are cloudy.  Notes she taking times to get her words out.  Feels terribly tired.        History/Other:   Current Medications:  Medication Reconciliation:  I am aware of an inpatient discharge within the last 30 days.  The discharge medication list has been reconciled with the patient's current medication list and reviewed by me. See medication list for additions of new medication, and changes to current doses of medications and discontinued medications.  Outpatient Medications Marked as Taking for the 11/20/24 encounter (Office Visit) with Madai Stephens, DO   Medication Sig    Butalbital-APAP-Caffeine -40 MG Oral Cap Take 1 capsule by mouth every 4 (four) hours as needed for Pain.       Review of Systems:  GENERAL: weight stable, energy stable, no sweating  SKIN: denies any unusual skin  lesions  EYES: denies blurred vision or double vision  HEENT: denies nasal congestion, sinus pain or ST  LUNGS: denies shortness of breath with exertion  CARDIOVASCULAR: denies chest pain on exertion or palpitations  GI: denies abdominal pain, denies heartburn, denies diarrhea  MUSCULOSKELETAL: denies pain, normal range of motion of extremities  NEURO: denies headaches, denies dizziness, weakness  and imbalance; cloudy thoughts  PSYCHE: denies depression or anxiety  HEMATOLOGIC: denies hx of anemia, new bruising, denies bleeding  ENDOCRINE: denies thyroid history  ALL/ASTHMA: denies hx of allergy or asthma    Objective:   No LMP recorded. Patient is postmenopausal.  Estimated body mass index is 26.47 kg/m² as calculated from the following:    Height as of this encounter: 5' 6\" (1.676 m).    Weight as of this encounter: 164 lb (74.4 kg).   /70 (BP Location: Left arm, Patient Position: Sitting, Cuff Size: adult)   Pulse 52   Resp 18   Ht 5' 6\" (1.676 m)   Wt 164 lb (74.4 kg)   SpO2 99%   BMI 26.47 kg/m²    GENERAL: well developed, well nourished, in no apparent distress; looks tired  SKIN: no rashes, no suspicious lesions  HEENT: atraumatic, normocephalic, ears and throat are clear  EYES: EOMI, conjunctiva are clear  NECK: supple, no adenopathy, no bruits  CHEST: no chest tenderness  LUNGS: clear to auscultation  CARDIO: RRR without murmur  GI: good BS's, no masses, HSM or tenderness  MUSCULOSKELETAL: back is not tender, FROM of the extremities  EXTREMITIES: no cyanosis, clubbing or edema  NEURO: Oriented times three, cranial nerves are intact, motor and sensory are grossly intact    Assessment & Plan:   1. Hospital discharge follow-up (Primary)  2. Syncope, unspecified syncope type  -     MRI BRAIN/IAC (ALL W+WO CNTRST) (CPT=70553); Future; Expected date: 11/20/2024  -     Physical Therapy Referral - [facility]  3. PE (pulmonary thromboembolism) ([facility])  4. Gait instability  -     MRI BRAIN/IAC (ALL  W+WO CNTRST) (CPT=70553); Future; Expected date: 11/20/2024  -     Physical Therapy Referral - [facility]  5. Acquired hypothyroidism  6. Crohn's disease of both small and large intestine without complication (HCC)  7. Fibromuscular dysplasia (HCC)  -     MRI BRAIN/IAC (ALL W+WO CNTRST) (CPT=70553); Future; Expected date: 11/20/2024  -     Physical Therapy Referral - [facility]  8. Pericardial effusion (HCC)  -     CARDIO - INTERNAL  9. Gastroesophageal reflux disease, unspecified whether esophagitis present  10. Migraine without aura and without status migrainosus, not intractable  -     Butalbital-APAP-Caffeine; Take 1 capsule by mouth every 4 (four) hours as needed for Pain.  Dispense: 30 capsule; Refill: 0  11. Overweight (BMI 25.0-29.9)  12. Medication management  13. Weakness  -     MRI BRAIN/IAC (ALL W+WO CNTRST) (CPT=70553); Future; Expected date: 11/20/2024  -     Physical Therapy Referral - [facility]    I spoke with Dr. Vallecillo regarding how Alexa has not felt right since the syncopal episode.  He agree with the MRI Brain with IAC.  Advised rest.  Advised to avoid driving until she feels more balance and clear in her thoughts.  Advised cardio to eval pericardial effusion.  Advised to discuss fibromuscular dysplasia with Dr. Vallecillo.  Fioricet filled for migraines.  Breathing is ok and on eliquis.        Return in 3 months (on 2/20/2025).

## 2024-11-20 NOTE — TELEPHONE ENCOUNTER
Received call from Dr. Pappas's office regarding patient. Advised Dr. Pappas would like to speak to Dr. Vallecillo about patient. Gave office number to return call, [phone]    Please advise.

## 2024-11-22 ENCOUNTER — PATIENT MESSAGE (OUTPATIENT)
Dept: FAMILY MEDICINE CLINIC | Facility: CLINIC | Age: 64
End: 2024-11-22

## 2024-11-22 DIAGNOSIS — R93.89 ABNORMAL FINDING ON IMAGING: Primary | ICD-10-CM

## 2024-11-22 NOTE — TELEPHONE ENCOUNTER
CXR 11/14/24 in ER    FINDINGS:  Cardiac silhouette is within normal limits.  Focal nodule in the left lower chest is noted which may represent a nipple shadow.  No effusion or pneumothorax.      Impression   CONCLUSION:  Left lower lobe opacity is noted.  PA and lateral chest radiographs are recommended.     Pt wanting to know if anything to be concerned about?  She reports she had hx of asthma and does still sometimes have chest tightness/cough but not particularly bothersome she says  Repeat CXR as recommended by radiology? Order pended if so, thanks!

## 2024-11-22 NOTE — TELEPHONE ENCOUNTER
No the CTA chest did not show any consolidation and since she does not have any worsening symptoms, I would not worry about it

## 2024-12-13 NOTE — PROGRESS NOTES
[facility] Hematology and Oncology Clinic Note    Visit Diagnosis:  1. Iron deficiency anemia due to chronic blood loss    2. Venous thromboembolism        History of Present Illness: 64F here to discuss anticoagulation and anemia 2/2 iron deficiency from Crohn's disease.     -64F with a PMH of Crohn's disease presented to the ER on 11/14/24 with syncope, Her syncopal event happened about a week ago but since then has had gait instability. No chest pain or dyspnea. CTA chest on 11/14/24 showed a RML/RLL PE with small burden. Bilateral dopplers negative. Has a long history of varicose veins. Vein procedure 2 years ago. No new leg pain/swelling. No personal or family history of VTE. No hormones. No travel. No surgeries or hospitalizations. Of note PTT elevated on admission. Her Lupus anticoagulant was positive. Normal Cardiolipins and B2GP. She was discharged on Eliquis.     Interval History:   -She is doing well on on her eliquis. No bleeding or bruising  -No blood in her stool  -feels lightheaded occasionally   -She found out that her mom may have had blood clots    Review of Systems: 12 Point ROS was completed and pertinent positives are in the HPI    Medications Ordered Prior to Encounter[1]  Past Medical History:    Acute pharyngitis    Acute sinusitis, unspecified    Allergy, unspecified not elsewhere classified    Anxiety    Chest pain, unspecified    Crohn's disease (HCC)    IBS (irritable bowel syndrome)    Obesity    Regional enteritis of unspecified site    Crohn's Disease, Crohn's ileitis    Wears glasses     Past Surgical History:   Procedure Laterality Date    Colonoscopy  3/14/14    Repeat in 3 years. persistant ileitis.  consider increasing remicade dose to 7.5mg    Colonoscopy  Nov. 2017    Colonoscopy with biopsy  6/30/2011    Other surgical history  5/8/2009    bxs-illeal and radom colon     Other surgical history      vein surgery    Upper gi endoscopy performed      duodenal diverticulum,  mild gastritis     Social History     Socioeconomic History    Marital status:    Tobacco Use    Smoking status: Never    Smokeless tobacco: Never   Vaping Use    Vaping status: Never Used   Substance and Sexual Activity    Alcohol use: Yes     Comment: Wine on occasion    Drug use: No      Family History   Problem Relation Age of Onset    Cancer Father 73        prostate    Prostate Cancer Father     Cancer Mother 75        pancreatic    Hypertension Mother     Diabetes Mother         My mother  from pancreatic cancer    Prostate Cancer Mother         Pancreatic cancer    Blood Clotting Disorder Mother     Other (stroke) Paternal Grandmother     Blood Disorder Other         blood clots, fam hx       Physical Exam  Height: --  Weight: 76 kg (167 lb 8 oz) (1128)  BSA (Calculated - sq m): --  Pulse: 61 (1128)  BP: 119/76 (1128)  Temp: 96 °F (35.6 °C) (1128)  Do Not Use - Resp Rate: --  SpO2: 99 % (1128)    General: NAD, AOX3  HEENT: clear op, mmm, no jvd, no scleral icterus  CV: RRR S1S2 no murmurs  Extremities: No edema   Lungs: CTAB, no increased work of breathing  Abd: soft nt nd +BS no hepatosplenomegaly  Neuro: CN: II-XII grossly intact      Results:  Lab Results   Component Value Date    WBC 7.4 11/15/2024    HGB 11.6 (L) 11/15/2024    HCT 34.3 (L) 11/15/2024    MCV 89.8 11/15/2024    .0 11/15/2024       No results for input(s): \"GLU\", \"BUN\", \"CREATSERUM\", \"GFRAA\", \"GFRNAA\", \"EGFRCR\", \"CA\", \"ALB\", \"NA\", \"K\", \"CL\", \"CO2\", \"ALKPHO\", \"AST\", \"ALT\", \"BILT\", \"TP\" in the last 168 hours.    Radiology:     Pathology:     Assessment and Plan:  RML/RLL PE: dx 24  -Appears unprovoked. But higher risk due to Crohn's disease  -Mom may have had blood clots   -Dopplers negative    Plan  -Continue eliquis 5 mg BID  -Plan on indefinite anticoagulation  -[facility] 2025-we can reduced eliquis dose to 2.5 mg BID then  -Check Factor V and [id number] given FH    Positive Lupus  anticoagulant  -Positive on admission. Possible false positive. Will repeat      Iron deficiency   -2/2 Crohn's  -Repeat CBC and Fe studies   -May need IV InFED    HENRY Villegas MD  [facility]         [1]   Current Outpatient Medications on File Prior to Visit   Medication Sig Dispense Refill    apixaban 5 MG Oral Tab take 1 tab (5mg) by mouth twice daily 60 tablet 0    CALCIUM + D3 OR Take 1 tablet by mouth daily.      levothyroxine 25 MCG Oral Tab Take 1 tablet (25 mcg total) by mouth before breakfast. 90 tablet 1    ustekinumab (STELARA) 90 MG/ML Subcutaneous Solution Prefilled Syringe injection MAINTENANCE: INJECT 1 SYRINGE SUBCUTANEOUSLY EVERY 8 WEEKS. REFRIGERATE. DO NOT FREEZE. 2 each 6    Cyanocobalamin (VITAMIN B 12) 500 MCG Oral Tab Take 1,000 mcg by mouth daily. 30 tablet 0    MULTIPLE VITAMIN OR Take 1 tablet by mouth daily.      Butalbital-APAP-Caffeine -40 MG Oral Cap Take 1 capsule by mouth every 4 (four) hours as needed for Pain. (Patient not taking: Reported on 12/16/2024) 30 capsule 0     Current Facility-Administered Medications on File Prior to Visit   Medication Dose Route Frequency Provider Last Rate Last Admin    [COMPLETED] gadoterate meglumine (Dotarem) 7.5 MMOL/15ML injection 15 mL  15 mL Intravenous ONCE PRN Madai Stephens, DO   15 mL at 11/20/24 [id number]

## 2024-12-16 ENCOUNTER — OFFICE VISIT (OUTPATIENT)
Age: 64
End: 2024-12-16
Attending: INTERNAL MEDICINE
Payer: COMMERCIAL

## 2024-12-16 VITALS
HEART RATE: 61 BPM | BODY MASS INDEX: 27 KG/M2 | TEMPERATURE: 96 F | RESPIRATION RATE: 18 BRPM | SYSTOLIC BLOOD PRESSURE: 119 MMHG | WEIGHT: 167.5 LBS | DIASTOLIC BLOOD PRESSURE: 76 MMHG | OXYGEN SATURATION: 99 %

## 2024-12-16 DIAGNOSIS — I82.90 VENOUS THROMBOEMBOLISM: ICD-10-CM

## 2024-12-16 DIAGNOSIS — D50.0 IRON DEFICIENCY ANEMIA DUE TO CHRONIC BLOOD LOSS: Primary | ICD-10-CM

## 2024-12-16 LAB
ALBUMIN SERPL-MCNC: 4.5 G/DL (ref 3.2–4.8)
ALBUMIN/GLOB SERPL: 1.5 {RATIO} (ref 1–2)
ALP LIVER SERPL-CCNC: 68 U/L
ALT SERPL-CCNC: 21 U/L
ANION GAP SERPL CALC-SCNC: 2 MMOL/L (ref 0–18)
AST SERPL-CCNC: 27 U/L (ref ?–34)
BASOPHILS # BLD AUTO: 0.05 X10(3) UL (ref 0–0.2)
BASOPHILS NFR BLD AUTO: 0.8 %
BILIRUB SERPL-MCNC: 0.5 MG/DL (ref 0.2–1.1)
BUN BLD-MCNC: 7 MG/DL (ref 9–23)
CALCIUM BLD-MCNC: 9.8 MG/DL (ref 8.7–10.4)
CHLORIDE SERPL-SCNC: 105 MMOL/L (ref 98–112)
CO2 SERPL-SCNC: 27 MMOL/L (ref 21–32)
CREAT BLD-MCNC: 0.77 MG/DL
DEPRECATED HBV CORE AB SER IA-ACNC: 45 NG/ML
EGFRCR SERPLBLD CKD-EPI 2021: 86 ML/MIN/1.73M2 (ref 60–?)
EOSINOPHIL # BLD AUTO: 0.16 X10(3) UL (ref 0–0.7)
EOSINOPHIL NFR BLD AUTO: 2.6 %
ERYTHROCYTE [DISTWIDTH] IN BLOOD BY AUTOMATED COUNT: 12.8 %
GLOBULIN PLAS-MCNC: 3 G/DL (ref 2–3.5)
GLUCOSE BLD-MCNC: 79 MG/DL (ref 70–99)
HCT VFR BLD AUTO: 38.8 %
HGB BLD-MCNC: 13.2 G/DL
IMM GRANULOCYTES # BLD AUTO: 0.03 X10(3) UL (ref 0–1)
IMM GRANULOCYTES NFR BLD: 0.5 %
IRON SATN MFR SERPL: 26 %
IRON SERPL-MCNC: 90 UG/DL
LYMPHOCYTES # BLD AUTO: 1.34 X10(3) UL (ref 1–4)
LYMPHOCYTES NFR BLD AUTO: 22.1 %
MCH RBC QN AUTO: 31.4 PG (ref 26–34)
MCHC RBC AUTO-ENTMCNC: 34 G/DL (ref 31–37)
MCV RBC AUTO: 92.4 FL
MONOCYTES # BLD AUTO: 0.41 X10(3) UL (ref 0.1–1)
MONOCYTES NFR BLD AUTO: 6.8 %
NEUTROPHILS # BLD AUTO: 4.07 X10 (3) UL (ref 1.5–7.7)
NEUTROPHILS # BLD AUTO: 4.07 X10(3) UL (ref 1.5–7.7)
NEUTROPHILS NFR BLD AUTO: 67.2 %
OSMOLALITY SERPL CALC.SUM OF ELEC: 275 MOSM/KG (ref 275–295)
PLATELET # BLD AUTO: 228 10(3)UL (ref 150–450)
POTASSIUM SERPL-SCNC: 4.5 MMOL/L (ref 3.5–5.1)
PROT SERPL-MCNC: 7.5 G/DL (ref 5.7–8.2)
RBC # BLD AUTO: 4.2 X10(6)UL
SODIUM SERPL-SCNC: 134 MMOL/L (ref 136–145)
TOTAL IRON BINDING CAPACITY: 344 UG/DL (ref 250–425)
TRANSFERRIN SERPL-MCNC: 262 MG/DL (ref 250–380)
WBC # BLD AUTO: 6.1 X10(3) UL (ref 4–11)

## 2024-12-16 NOTE — PROGRESS NOTES
Patient here for post hospital follow-up. Continues on Eliquis 5 mg BID without issue. Has a dental filling tomorrow. Would like to discuss anticoagulation management  to have that done.

## 2024-12-17 ENCOUNTER — PATIENT MESSAGE (OUTPATIENT)
Age: 64
End: 2024-12-17

## 2024-12-17 LAB
APTT PPP: 48.5 SECONDS (ref 23–36)
B2 GLYCOPROT1 IGG SERPL IA-ACNC: 0.9 U/ML (ref ?–7)
B2 GLYCOPROT1 IGM SERPL IA-ACNC: <2.4 U/ML (ref ?–7)
CARDIOLIPIN IGG SERPL-ACNC: 3 GPL (ref ?–10)
CARDIOLIPIN IGM SERPL-ACNC: 1.6 MPL (ref ?–10)
INR BLD: 1.02 (ref 0.85–1.16)
LA 3 SCREEN W REFLEX-IMP: POSITIVE
PROTHROMBIN TIME: 13.5 SECONDS (ref 11.6–14.8)
PTT (HEPZYME): 52.5 SECONDS (ref 23–36)
SCREEN DRVVT: 1.66 S (ref 0–1.29)
SCREEN DRVVT: POSITIVE S
STACLOT LA DELTA: 18.9 SECONDS (ref ?–8)

## 2025-01-03 ENCOUNTER — OFFICE VISIT (OUTPATIENT)
Dept: FAMILY MEDICINE CLINIC | Facility: CLINIC | Age: 65
End: 2025-01-03
Payer: COMMERCIAL

## 2025-01-03 ENCOUNTER — OFFICE VISIT (OUTPATIENT)
Dept: NEUROLOGY | Facility: CLINIC | Age: 65
End: 2025-01-03
Payer: COMMERCIAL

## 2025-01-03 VITALS
SYSTOLIC BLOOD PRESSURE: 126 MMHG | HEART RATE: 71 BPM | BODY MASS INDEX: 26.84 KG/M2 | WEIGHT: 167 LBS | RESPIRATION RATE: 16 BRPM | DIASTOLIC BLOOD PRESSURE: 61 MMHG | HEIGHT: 66 IN

## 2025-01-03 VITALS
HEART RATE: 56 BPM | RESPIRATION RATE: 16 BRPM | OXYGEN SATURATION: 100 % | WEIGHT: 168 LBS | HEIGHT: 66 IN | DIASTOLIC BLOOD PRESSURE: 70 MMHG | SYSTOLIC BLOOD PRESSURE: 120 MMHG | BODY MASS INDEX: 27 KG/M2

## 2025-01-03 DIAGNOSIS — K50.80 CROHN'S DISEASE OF BOTH SMALL AND LARGE INTESTINE WITHOUT COMPLICATION (HCC): ICD-10-CM

## 2025-01-03 DIAGNOSIS — R26.81 GAIT INSTABILITY: ICD-10-CM

## 2025-01-03 DIAGNOSIS — Z71.85 VACCINE COUNSELING: ICD-10-CM

## 2025-01-03 DIAGNOSIS — I77.3 FIBROMUSCULAR DYSPLASIA (HCC): ICD-10-CM

## 2025-01-03 DIAGNOSIS — Z79.899 MEDICATION MANAGEMENT: ICD-10-CM

## 2025-01-03 DIAGNOSIS — D50.9 IRON DEFICIENCY ANEMIA, UNSPECIFIED IRON DEFICIENCY ANEMIA TYPE: Primary | ICD-10-CM

## 2025-01-03 DIAGNOSIS — Z23 NEED FOR VACCINATION: ICD-10-CM

## 2025-01-03 DIAGNOSIS — R55 SYNCOPE, UNSPECIFIED SYNCOPE TYPE: Primary | ICD-10-CM

## 2025-01-03 DIAGNOSIS — I26.99 PE (PULMONARY THROMBOEMBOLISM) (HCC): ICD-10-CM

## 2025-01-03 PROCEDURE — 99213 OFFICE O/P EST LOW 20 MIN: CPT | Performed by: OTHER

## 2025-01-03 PROCEDURE — 90471 IMMUNIZATION ADMIN: CPT | Performed by: FAMILY MEDICINE

## 2025-01-03 PROCEDURE — 99214 OFFICE O/P EST MOD 30 MIN: CPT | Performed by: FAMILY MEDICINE

## 2025-01-03 PROCEDURE — 90746 HEPB VACCINE 3 DOSE ADULT IM: CPT | Performed by: FAMILY MEDICINE

## 2025-01-03 NOTE — PATIENT INSTRUCTIONS
Refill policies:    Allow 2-3 business days for refills; controlled substances may take longer.  Contact your pharmacy at least 5 days prior to running out of medication and have them send an electronic request or submit request through the “request refill” option in your [employer] account.  Refills are not addressed on weekends; covering physicians do not authorize routine medications on weekends.  No narcotics or controlled substances are refilled after noon on Fridays or by on call physicians.  By law, narcotics must be electronically prescribed.  A 30 day supply with no refills is the maximum allowed.  If your prescription is due for a refill, you may be due for a follow up appointment.  To best provide you care, patients receiving routine medications need to be seen at least once a year.  Patients receiving narcotic/controlled substance medications need to be seen at least once every 3 months.  In the event that your preferred pharmacy does not have the requested medication in stock (e.g. Backordered), it is your responsibility to find another pharmacy that has the requested medication available.  We will gladly send a new prescription to that pharmacy at your request.    Scheduling Tests:    If your physician has ordered radiology tests such as MRI or CT scans, please contact Central Scheduling at [phone] right away to schedule the test.  Once scheduled, the [facility] Centralized Referral Team will work with your insurance carrier to obtain pre-certification or prior authorization.  Depending on your insurance carrier, approval may take 3-10 days.  It is highly recommended patients assure they have received an authorization before having a test performed.  If test is done without insurance authorization, patient may be responsible for the entire amount billed.      Precertification and Prior Authorizations:  If your physician has recommended that you have a procedure or additional testing performed the [facility]  Centralized Referral Team will contact your insurance carrier to obtain pre-certification or prior authorization.    You are strongly encouraged to contact your insurance carrier to verify that your procedure/test has been approved and is a COVERED benefit.  Although the [facility] Centralized Referral Team does its due diligence, the insurance carrier gives the disclaimer that \"Although the procedure is authorized, this does not guarantee payment.\"    Ultimately the patient is responsible for payment.   Thank you for your understanding in this matter.  Paperwork Completion:  If you require FMLA or disability paperwork for your recovery, please make sure to either drop it off or have it faxed to our office at [phone]. Be sure the form has your name and date of birth on it.  The form will be faxed to our Forms Department and they will complete it for you.  There is a 25$ fee for all forms that need to be filled out.  Please be aware there is a 10-14 day turnaround time.  You will need to sign a release of information (CAMERON) form if your paperwork does not come with one.  You may call the Forms Department with any questions at [phone].  Their fax number is [phone].

## 2025-01-03 NOTE — PROGRESS NOTES
[facility] Progress Note    HPI  Chief Complaint   Patient presents with    Hospital F/U     11/14/2024 due to light headiness,vertigo \"like if drinking all night\" head feels like in a cloud,passed out and still has some vertigo    Test Results     MRI Brain 11/20/2024 & CT/CTA Carotids 11/14/2024     Alexa Wolf is a 64 year old  female with PMHx including but not limited to Crohn's disease, who presented to ER 11/14/2024 with syncopal event.  Patient reportedly had new onset of syncope when she stood up and felt light headed like everything was moving around her. She then awoke on the ground confused that she had urinated on her self; of note, she had awoken in the middle of the night to go to the restroom to urinate but suddenly felt dizzy when she was standing and did not make it to the toilet.  She denied any auras of odd tastes, smells, or sounds prior to this event. She denied any clear convulsions and did not bite her tongue but lost her urine and thinks she was not aware for several minutes.     Since this time, she has not had any new events, but has continued to feel \"off\" and mildly unsteady. She improved with fioricet and CTA brain/ carotids done during admission showed no vascular stenosis or aneurysm, though changes of fibromuscular dysplasia were noted. She had workup which demonstrated R sided PE as well and was admitted for further workup.     Neurology was consulted for syncope evaluation; workup in hospital showed no acute stroke and EEG showed no seizures or epileptiform activity.       Patient was seen in hospital and after discharge was still feeling off balance and \"foggy.\" She was seen by PCP and had MRI brain / IAC which showed no recent stroke and she was noted to have iron deficiency anemia and is now on oral iron supplement. She has noted some improvement in thinking since starting this a few weeks ago; no episodes of loss of awareness or auras of odd  tastes, smells or sounds or episodes of speech arrest or waking in AM having wet the bed or bitten her tongue or convulsions noted.         Past Medical History:    Acute pharyngitis    Acute sinusitis, unspecified    Allergy, unspecified not elsewhere classified    Anxiety    Chest pain, unspecified    Crohn's disease (HCC)    IBS (irritable bowel syndrome)    Obesity    Regional enteritis of unspecified site    Crohn's Disease, Crohn's ileitis    Wears glasses     Past Surgical History:   Procedure Laterality Date    Colonoscopy  3/14/14    Repeat in 3 years. persistant ileitis.  consider increasing remicade dose to 7.5mg    Colonoscopy  2017    Colonoscopy with biopsy  2011    Other surgical history  2009    bxs-illeal and radom colon     Other surgical history      vein surgery    Upper gi endoscopy performed      duodenal diverticulum, mild gastritis     Family History   Problem Relation Age of Onset    Cancer Father 73        prostate    Prostate Cancer Father     Cancer Mother 75        pancreatic    Hypertension Mother     Diabetes Mother         My mother  from pancreatic cancer    Prostate Cancer Mother         Pancreatic cancer    Blood Clotting Disorder Mother     Other (stroke) Paternal Grandmother     Blood Disorder Other         blood clots, fam hx     Social History     Socioeconomic History    Marital status:    Tobacco Use    Smoking status: Never     Passive exposure: Never    Smokeless tobacco: Never   Vaping Use    Vaping status: Never Used   Substance and Sexual Activity    Alcohol use: Yes     Comment: Wine on occasion    Drug use: No   Other Topics Concern    Caffeine Concern Yes     Comment: 2 lg a day    Exercise Yes     Comment: 3-5 x a week       Allergies[1]      Current Outpatient Medications:     apixaban 5 MG Oral Tab, take 1 tab (5mg) by mouth twice daily, Disp: 60 tablet, Rfl: 0    Butalbital-APAP-Caffeine -40 MG Oral Cap, Take 1 capsule by mouth every  4 (four) hours as needed for Pain., Disp: 30 capsule, Rfl: 0    CALCIUM + D3 OR, Take 1 tablet by mouth daily., Disp: , Rfl:     levothyroxine 25 MCG Oral Tab, Take 1 tablet (25 mcg total) by mouth before breakfast., Disp: 90 tablet, Rfl: 1    ustekinumab (STELARA) 90 MG/ML Subcutaneous Solution Prefilled Syringe injection, MAINTENANCE: INJECT 1 SYRINGE SUBCUTANEOUSLY EVERY 8 WEEKS. REFRIGERATE. DO NOT FREEZE., Disp: 2 each, Rfl: 6    Cyanocobalamin (VITAMIN B 12) 500 MCG Oral Tab, Take 1,000 mcg by mouth daily., Disp: 30 tablet, Rfl: 0    MULTIPLE VITAMIN OR, Take 1 tablet by mouth daily., Disp: , Rfl:     Review of Systems:  No chest pain or palpitations; otherwise as noted in HPI.    Exam:  /61 (BP Location: Left arm, Patient Position: Sitting, Cuff Size: large)   Pulse 71   Resp 16   Ht 66\"   Wt 167 lb (75.8 kg)   BMI 26.95 kg/m²   Estimated body mass index is 26.95 kg/m² as calculated from the following:    Height as of this encounter: 66\".    Weight as of this encounter: 167 lb (75.8 kg).    Gen: well developed, well nourished, no acute distress  HEENT: normocephalic  Heart; normal S1/S2, regular rate and rhythm  Lungs: clear to auscultation bilaterally  Extremities: no edema, peripheral pulses intact    Neck: supple, full range of motion; no carotid bruits    Fundoscopic Exam: optic discs sharp bilaterally    Neuro:  Mental status:  Orientation: Alert and oriented to person, place, time  Speech Fluent and conversational    CN: PERRL, EOMI with no nystagmus, VFF, smile symmetric, sensation intact, tongue and palate midline, SCM intact, otherwise, CN 2-12 intact  Motor: 5/5 strength throughout, tone normal  DTR: 2+ symmetric throughout, toes downgoing bilaterally, no clonus  Sensory: intact to light touch throughout  Coord: FNF intact with no tremor or dysmetria; rapid alternating movements intact bilaterally  Romberg: absent  Gait: normal casual gait    Labs:    11/15/2024  B12: 811 - normal      Imaging:    MRI BRAIN/IAC (ALL W+WO CNTRST) (CPT=70553)    Result Date: 11/20/2024  CONCLUSION:   1. No acute intracranial process  2. Mild to moderate burden of T2/FLAIR hyperintense foci within the frontal and parietal subcortical and periventricular white matter consistent small vessel ischemic disease.   3. The IAC's are grossly unremarkable without evidence of mass lesion or abnormal enhancement of the 7th or 8th nerves.   LOCATION:  [facility]   Dictated by (CST): Kurtis Cruz MD on 11/20/2024 at 10:34 PM     Finalized by (CST): Kurtis Cruz MD on 11/20/2024 at 10:40 PM       US VENOUS DOPPLER LEG BILAT - DIAG IMG (CPT=93970)    Result Date: 11/15/2024  CONCLUSION:  No evidence for DVT in the visualized deep veins of either lower extremity.   LOCATION:  [facility]   Dictated by (CST): Salomón Vera MD on 11/15/2024 at 6:35 PM     Finalized by (CST): Salomón Vera MD on 11/15/2024 at 6:36 PM       CT CHEST PE AORTA (IV ONLY) (CPT=71260)    Result Date: 11/14/2024  CONCLUSION:  There are proximal segmental and subsegmental branch pulmonary emboli identified within the right middle lobe and right lower lobe.  Overall volume of the pulmonary emboli is small.  The critical test results were called to Dr. Elder at 1317 hours on 11/14/2024 with appropriate read back.   LOCATION:  [facility]   Dictated by (CST): Salomón Vera MD on 11/14/2024 at 1:09 PM     Finalized by (CST): Salomón Vera MD on 11/14/2024 at 1:18 PM       CTA BRAIN + CTA CAROTIDS (CPT=70496/96269)    Result Date: 11/14/2024  CONCLUSION:   1. No acute intracranial abnormality identified.  2. Minimal age-indeterminate microvascular ischemic changes in the cerebral white matter. If there is clinical concern for acute ischemia/infarction, an MRI of the brain would be recommended for further evaluation.  3. No evidence of intracranial aneurysm, flow-limiting stenosis, or focal arterial occlusion.  4. No evidence of occlusion, acute  dissection, or flow-limiting stenosis in the cervical vertebral or carotid arteries. No evidence of hemodynamically significant carotid stenosis by NASCET criteria.  5. Changes of fibromuscular dysplasia in the cervical internal and cervical vertebral arteries as above.   Please see above for further details.  LOCATION:  [id number]   Dictated by (CST): Calixto Graves MD on 11/14/2024 at 1:09 PM     Finalized by (CST): Calixto Graves MD on 11/14/2024 at 1:12 PM       XR CHEST AP PORTABLE  (CPT=71045)    Result Date: 11/14/2024  CONCLUSION:  Left lower lobe opacity is noted.  PA and lateral chest radiographs are recommended.   LOCATION:  [facility]      Dictated by (CST): Gorge Lemos MD on 11/14/2024 at 11:41 AM     Finalized by (CST): Gorge Lemos MD on 11/14/2024 at 11:42 AM          Other procedures  None new    Prior as noted below     EEG (11/15/2024):   Impression:   This EEG is mildly abnormal due to intermittent frontal delta slowing; this is a nonspecific finding and may be due to mild encephalopathy vs global cerebral dysfunction.     No epileptiform activity, or clinical or electrographic seizures were noted on this awake and asleep  recording.            Impression/ Plan:  Alexa Wolf is a 64 year old with PMHx including but not limited to Crohn's disease, who presented to ER 11/14/2024 with syncopal event.  Patient reportedly had new onset of syncope when she stood up and felt light headed like everything was moving around her. She then awoke on the ground confused that she had urinated on her self; of note, she had awoken in the middle of the night to go to the restroom to urinate but suddenly felt dizzy when she was standing and did not make it to the toilet.  She denied any auras of odd tastes, smells, or sounds prior to this event. She denied any clear convulsions and did not bite her tongue but lost her urine and thinks she was not aware for several minutes.     Since this time, she has not  had any new events, but has continued to feel \"off\" and mildly unsteady. She improved with fioricet and CTA brain/ carotids done during admission showed no vascular stenosis or aneurysm, though changes of fibromuscular dysplasia were noted. She had workup which demonstrated R sided PE as well and was admitted for further workup.     Neurology was consulted for syncope evaluation; workup in hospital showed no acute stroke and EEG showed no seizures or epileptiform activity.       Patient was seen in hospital and after discharge was still feeling off balance and \"foggy.\" She was seen by PCP and had MRI brain / IAC which showed no recent stroke and she was noted to have iron deficiency anemia and is now on oral iron supplement. She has noted some improvement in thinking since starting this a few weeks ago; no episodes of loss of awareness or auras of odd tastes, smells or sounds or episodes of speech arrest or waking in AM having wet the bed or bitten her tongue or convulsions noted.     Exam is normal and non-focal. Her symptoms have resolved and with negative neurologic workup, suspect her initial event was syncope related to PE and anemia; recommend follow up as needed and otherwise follow up with PCP and/or hematology.     1. Syncope, unspecified syncope type  As noted above    No follow-ups on file.    Jeison Vallecillo MD, Neurology  [facility]  Pager [phone]  1/3/2025                 [1]   Allergies  Allergen Reactions    Ampicillin RASH     \"CAPS\"    Bactrim     Sulfa Drugs Cross Reactors RASH and FEVER    Sulfamethoxazole      \"TMP DS\"

## 2025-01-03 NOTE — PROGRESS NOTES
Alexa Wolf is a 64 year old female.  HPI:   Pt. Is here for follow up.  Neuro -- feels she was born with the  fibromuscular dysplasia and not necessarily the cause of the episode she had  Hem/Onc -- Dr. Villegas; advised Fe 65 -- hx of PE -- family hx of clots and may due to her remote hx of Crohn's  Iron def -- on iron 50 mg daily not 65 mg   Pericardial effusion -- saw cards and stable  -- Dr. Saucedo; will repeat echo  Gait instability -- she will set up PT.      Current Outpatient Medications   Medication Sig Dispense Refill    apixaban 5 MG Oral Tab take 1 tab (5mg) by mouth twice daily 60 tablet 0    Butalbital-APAP-Caffeine -40 MG Oral Cap Take 1 capsule by mouth every 4 (four) hours as needed for Pain. 30 capsule 0    CALCIUM + D3 OR Take 1 tablet by mouth daily.      levothyroxine 25 MCG Oral Tab Take 1 tablet (25 mcg total) by mouth before breakfast. 90 tablet 1    ustekinumab (STELARA) 90 MG/ML Subcutaneous Solution Prefilled Syringe injection MAINTENANCE: INJECT 1 SYRINGE SUBCUTANEOUSLY EVERY 8 WEEKS. REFRIGERATE. DO NOT FREEZE. 2 each 6    Cyanocobalamin (VITAMIN B 12) 500 MCG Oral Tab Take 1,000 mcg by mouth daily. 30 tablet 0    MULTIPLE VITAMIN OR Take 1 tablet by mouth daily.       No current facility-administered medications for this visit.      Allergies[1]   Past Medical History:    Acute pharyngitis    Acute sinusitis, unspecified    Allergy, unspecified not elsewhere classified    Anxiety    Chest pain, unspecified    Crohn's disease (HCC)    IBS (irritable bowel syndrome)    Obesity    Regional enteritis of unspecified site    Crohn's Disease, Crohn's ileitis    Wears glasses      Social History:  Social History     Socioeconomic History    Marital status:    Tobacco Use    Smoking status: Never     Passive exposure: Never    Smokeless tobacco: Never   Vaping Use    Vaping status: Never Used   Substance and Sexual Activity    Alcohol use: Yes     Comment: Wine on  occasion    Drug use: No   Other Topics Concern    Caffeine Concern Yes     Comment: 2 lg a day    Exercise Yes     Comment: 3-5 x a week     Social Drivers of Health     Financial Resource Strain: Low Risk  (11/18/2024)    Financial Resource Strain     Difficulty of Paying Living Expenses: Not very hard     Med Affordability: No   Food Insecurity: No Food Insecurity (11/14/2024)    Food Insecurity     Food Insecurity: Never true   Transportation Needs: No Transportation Needs (11/18/2024)    Transportation Needs     Lack of Transportation: No   Housing Stability: Low Risk  (11/14/2024)    Housing Stability     Housing Instability: No        Results for orders placed or performed in visit on 12/16/24   CBC W/DIFF [E]    Collection Time: 12/16/24 11:24 AM   Result Value Ref Range    WBC 6.1 4.0 - 11.0 x10(3) uL    RBC 4.20 3.80 - 5.30 x10(6)uL    HGB 13.2 12.0 - 16.0 g/dL    HCT 38.8 35.0 - 48.0 %    .0 150.0 - 450.0 10(3)uL    MCV 92.4 80.0 - 100.0 fL    MCH 31.4 26.0 - 34.0 pg    MCHC 34.0 31.0 - 37.0 g/dL    RDW 12.8 %    Neutrophil Absolute Prelim 4.07 1.50 - 7.70 x10 (3) uL    Neutrophil Absolute 4.07 1.50 - 7.70 x10(3) uL    Lymphocyte Absolute 1.34 1.00 - 4.00 x10(3) uL    Monocyte Absolute 0.41 0.10 - 1.00 x10(3) uL    Eosinophil Absolute 0.16 0.00 - 0.70 x10(3) uL    Basophil Absolute 0.05 0.00 - 0.20 x10(3) uL    Immature Granulocyte Absolute 0.03 0.00 - 1.00 x10(3) uL    Neutrophil % 67.2 %    Lymphocyte % 22.1 %    Monocyte % 6.8 %    Eosinophil % 2.6 %    Basophil % 0.8 %    Immature Granulocyte % 0.5 %   COMP METABOLIC PANEL [E]    Collection Time: 12/16/24 11:24 AM   Result Value Ref Range    Glucose 79 70 - 99 mg/dL    Sodium 134 (L) 136 - 145 mmol/L    Potassium 4.5 3.5 - 5.1 mmol/L    Chloride 105 98 - 112 mmol/L    CO2 27.0 21.0 - 32.0 mmol/L    Anion Gap 2 0 - 18 mmol/L    BUN 7 (L) 9 - 23 mg/dL    Creatinine 0.77 0.55 - 1.02 mg/dL    Calcium, Total 9.8 8.7 - 10.4 mg/dL    Calculated  Osmolality 275 275 - 295 mOsm/kg    eGFR-Cr 86 >=60 mL/min/1.73m2    AST 27 <34 U/L    ALT 21 10 - 49 U/L    Alkaline Phosphatase 68 50 - 130 U/L    Bilirubin, Total 0.5 0.2 - 1.1 mg/dL    Total Protein 7.5 5.7 - 8.2 g/dL    Albumin 4.5 3.2 - 4.8 g/dL    Globulin  3.0 2.0 - 3.5 g/dL    A/G Ratio 1.5 1.0 - 2.0    Patient Fasting for CMP? Patient not present    FERRITIN [E]    Collection Time: 12/16/24 11:24 AM   Result Value Ref Range    Ferritin 45 (L) 50 - 306 ng/mL   IRON AND TIBC [E]    Collection Time: 12/16/24 11:24 AM   Result Value Ref Range    Iron 90 50 - 170 ug/dL    Transferrin 262 250 - 380 mg/dL    Total Iron Binding Capacity 344 250 - 425 ug/dL    % Saturation 26 15 - 50 %   Lupus Anticoagulant Comp    Collection Time: 12/16/24 11:24 AM   Result Value Ref Range    Pathologist Interpretation       Results indicate the presence of a lupus anticoagulant. However, the results are unreliable and should be interpreted with caution if the patient received anticoagulant medication that interferes with clot based testing (apixaban). Repeat testing in 12 weeks is recommended to rule out transient antibodies that are considered non-pathologic.    Reviewed by Rhoda Velasco M.D. Pathology 12/17/24 at 12:30 PM    PT (Lupus) 13.5 11.6 - 14.8 seconds    PTT (Lupus) 48.5 (H) 23.0 - 36.0 seconds    Hexagonal Phase Staclot LA Positive (A) Negative    DRVVT Ratio 1.66 (H) 0.00 - 1.29    DRVVT Lupus Anticoagulant Positive (A) Negative    INR (Lupus) 1.02 0.85 - 1.16    PTT (Hepzyme) 52.5 (H) 23 - 36 seconds    StaClot LA Delta 18.90 (H) <8.00 seconds   Beta 2 Glycoprotein I AB, G/M    Collection Time: 12/16/24 11:24 AM   Result Value Ref Range    Beta 2 Glycoprotein 1 Ab, IgG 0.9 <7 U/mL    Beta 2 Glycoprotein 1 Ab, IgM <2.4 <7 U/mL   Anticardiolipin AB, IgG/M, QN    Collection Time: 12/16/24 11:24 AM   Result Value Ref Range    Cardiolipin IgG Antibody 3.0 <10 GPL    Cardiolipin IgM Antibody 1.6 <10 MPL       REVIEW OF  SYSTEMS:   GENERAL: feels well otherwise  SKIN: denies any unusual skin lesions  LUNGS: denies shortness of breath with exertion  CARDIOVASCULAR: denies chest pain on exertion  GI: denies abdominal pain,denies heartburn  MUSCULOSKELETAL: denies back pain  EXTREMITIES:  No pain or numbness    EXAM:   /70 (BP Location: Left arm, Patient Position: Sitting, Cuff Size: adult)   Pulse 56   Resp 16   Ht 5' 6\" (1.676 m)   Wt 168 lb (76.2 kg)   SpO2 100%   BMI 27.12 kg/m²   GENERAL: well developed, well nourished,in no apparent distress  SKIN: no rashes,no suspicious lesions  HEENT: atraumatic, normocephalic  NECK: supple,no adenopathy,no bruits  LUNGS: clear to auscultation  CARDIO: RRR without murmur  GI: soft, good BS's; no HSM  EXTREMITIES: no cyanosis, clubbing or edema    ASSESSMENT AND PLAN:     Encounter Diagnoses   Name Primary?    Iron deficiency anemia, unspecified iron deficiency anemia type Yes    PE (pulmonary thromboembolism) (HCC)     Crohn's disease of both small and large intestine without complication (HCC)     Fibromuscular dysplasia (HCC)     Gait instability     Medication management     Vaccine counseling     Need for vaccination        Orders Placed This Encounter   Procedures    CBC With Differential With Platelet    Iron And Tibc    Ferritin    Hep B, Adult [35072]       Meds & Refills for this Visit:  Requested Prescriptions      No prescriptions requested or ordered in this encounter       Imaging & Consults:  HEPATITIS B VACCINE, OVER 20    Given Hep B # 3.  Neuro, hem/onc, cards all cleared her.  Will do PT.  Doing well overall.  Iron def -- may cont. Fe 50 mg daily    The patient indicates understanding of these issues and agrees to the plan.  Return in about 5 months (around 6/3/2025) for AWV/Med check.         [1]   Allergies  Allergen Reactions    Ampicillin RASH     \"CAPS\"    Bactrim     Sulfa Drugs Cross Reactors RASH and FEVER    Sulfamethoxazole      \"TMP DS\"

## 2025-02-07 ENCOUNTER — HOSPITAL ENCOUNTER (OUTPATIENT)
Dept: MAMMOGRAPHY | Age: 65
Discharge: HOME | End: 2025-02-07
Attending: FAMILY MEDICINE
Payer: COMMERCIAL

## 2025-02-07 ENCOUNTER — HOSPITAL ENCOUNTER (OUTPATIENT)
Dept: BONE DENSITY | Age: 65
Discharge: HOME | End: 2025-02-07
Attending: FAMILY MEDICINE
Payer: COMMERCIAL

## 2025-02-07 DIAGNOSIS — Z13.820 SCREENING FOR OSTEOPOROSIS: ICD-10-CM

## 2025-02-07 DIAGNOSIS — Z12.31 ENCOUNTER FOR SCREENING MAMMOGRAM FOR MALIGNANT NEOPLASM OF BREAST: ICD-10-CM

## 2025-02-07 PROCEDURE — 77067 SCR MAMMO BI INCL CAD: CPT | Performed by: FAMILY MEDICINE

## 2025-02-07 PROCEDURE — 77080 DXA BONE DENSITY AXIAL: CPT | Performed by: FAMILY MEDICINE

## 2025-02-07 PROCEDURE — 77063 BREAST TOMOSYNTHESIS BI: CPT | Performed by: FAMILY MEDICINE

## 2025-02-07 NOTE — PROGRESS NOTES
Dear Alexa Wolf,    Your bone density/dexa shows osteopenia.  Focus on weight bearing exercises, adequate vitamin D and calcium intake. I recommend repeating your dexa in 2 years.    Sincerely,  Dr. Stephens

## 2025-02-11 DIAGNOSIS — R79.89 ABNORMAL THYROID BLOOD TEST: ICD-10-CM

## 2025-02-12 RX ORDER — LEVOTHYROXINE SODIUM 25 UG/1
25 TABLET ORAL
Qty: 90 TABLET | Refills: 1 | Status: SHIPPED | OUTPATIENT
Start: 2025-02-12

## 2025-02-12 NOTE — TELEPHONE ENCOUNTER
Last office visit: 1/3/25   Protocol: pass  Requested medication(s) are due for refill today: yes  Requested medication(s) are on the active medication list same strength, form, dose/ sig:yes  Requested medication(s) are managed by provider: yes  Patient has already received a courtsey refill: no    NOV: 6/13/25    Asked to Return: 6/3/25

## 2025-03-07 ENCOUNTER — OFFICE VISIT (OUTPATIENT)
Dept: PHYSICAL THERAPY | Facility: HOSPITAL | Age: 65
End: 2025-03-07
Attending: FAMILY MEDICINE
Payer: COMMERCIAL

## 2025-03-07 ENCOUNTER — TELEPHONE (OUTPATIENT)
Dept: PHYSICAL THERAPY | Facility: HOSPITAL | Age: 65
End: 2025-03-07

## 2025-03-07 DIAGNOSIS — R55 SYNCOPE, UNSPECIFIED SYNCOPE TYPE: Primary | ICD-10-CM

## 2025-03-07 DIAGNOSIS — R53.1 WEAKNESS: ICD-10-CM

## 2025-03-07 DIAGNOSIS — I77.3 FIBROMUSCULAR DYSPLASIA: ICD-10-CM

## 2025-03-07 DIAGNOSIS — R26.81 GAIT INSTABILITY: ICD-10-CM

## 2025-03-07 PROCEDURE — 97163 PT EVAL HIGH COMPLEX 45 MIN: CPT | Performed by: PHYSICAL THERAPIST

## 2025-03-07 PROCEDURE — 97110 THERAPEUTIC EXERCISES: CPT | Performed by: PHYSICAL THERAPIST

## 2025-03-10 NOTE — PROGRESS NOTES
NEUROLOGICAL EVALUATION:     Diagnosis:   Syncope, unspecified syncope type (R55)  Gait instability (R26.81)  Fibromuscular dysplasia (I77.3)  Weakness (R53.1) Patient:  Alexa Wolf (64 year old, female)        Referring Provider: Madai Stephens  Today's Date   3/9/2025    Precautions:  Fall Risk   Date of Evaluation: 03/07/25  Next MD visit: No data recorded  Date of Surgery: No data recorded     PATIENT SUMMARY   Summary of chief complaints: Generalized weakness, balance and coordination  History of current condition: States that on November 8 she passed out in home, doesn't know how long she was out ;  states that   Dog woke her up .  She realized taht she urinated and was able to get up and clean the area .   Back of head throbbed, and she noted that she felt out of sorts.  Went to the ER after being encouraged by her family to go the er;  was hospitalized for blood thinners no stroke, seizure   Concussions negative  MRI good   \"I just feel off ,Like she been drinking \"  Little bit of veritgo , balance, it goes away.   States that her dog has pulled me down, avoids stairs feels like she is going to fall     Has not falen but came close a couple of time       Seeing Neurologist for ringing in the ears    Am;  feels ok , then a light switch and feels woozy.   Doesn't get worse when she doing things, its just there.  Don't let it stop me from doing things .   Never had anything like this before.   Pain level: current 0 /10, at best 0 /10, at worst 0 /10  Description of symptoms: Feelings of occ vertigo, balance;   Occupation: retired   Leisure activities/Hobbies: lives in [facility] , takes both exercise classes and other classes .   Prior level of function: independent  Current limitations: limited with activities that involve balance, walking on uneven surfaces  Pt goals: to get stronger  History of falls: Yes     Home Environment: lives in ranch style home with    ADLs: limited with endurance,  balance     Pt denies diplopia, dysarthria, dysphasia, dizziness, drop attacks, bowel/bladder changes, saddle anesthesia, and NEO LE N/T.  Past medical history was reviewed with Alexa.  Significant findings include:    Imaging/Tests:     Alexa  has a past medical history of Acute pharyngitis (06/29/2012), Acute sinusitis, unspecified (06/29/2012), Allergy, unspecified not elsewhere classified (06/29/2012), Anxiety, Chest pain, unspecified (06/29/2012), Crohn's disease (HCC), IBS (irritable bowel syndrome), Obesity, Regional enteritis of unspecified site (06/29/2012), and Wears glasses (1968).  She  has a past surgical history that includes colonoscopy with biopsy (6/30/2011); colonoscopy (3/14/14); upper gi endoscopy performed; other surgical history (5/8/2009); other surgical history; and colonoscopy (Nov. 2017).    ASSESSMENT  Alexa presents to physical therapy evaluation with primary c/o Generalized weakness, balance and coordination. The results of the objective tests and measures show Loss of single leg balance, generalized weakness with a fall risk. Functional deficits include but are not limited to limited with activities that involve balance, walking on uneven surfaces. Signs and symptoms are consistent with diagnosis of Syncope, unspecified syncope type (R55)  Gait instability (R26.81)  Fibromuscular dysplasia (I77.3)  Weakness (R53.1). Pt and PT discussed evaluation findings, pathology, POC and HEP.  Pt voiced understanding and performs HEP correctly without reported pain. Skilled Physical Therapy is medically necessary to address the above impairments and reach functional goals.    OBJECTIVE:    Musculoskeletal:  Observation/Posture: forward head, rounded shoulders     ROM and Strength:  (* denotes performed with pain)  Shoulder   MMT (-/5)    R L     Flex         Ext         Abd (C5) 4 4     IR 4 4     ER 4 4     Low Trap 4 4     Mid Trap 4 4     SA 4 4     ,   Elbow   MMT (-/5)    R L     Flex (C6) 4 4      Ext (C7) 4 4     Pronation 4 4     Supination 4 4   ,   Wrist   ROM MMT (-/5)    R L R L     Flex (C7)     4 4     Ext (C6)     4 4     Ulnar Dev     4 4     Radial Dev     4 4     Thumb Ext (C8)     4 4     Digit Flex (C8) n/a         Interossei (T1) n/a 4 4      (lbs) n/a 4 4     Pinch (lbs) n/a 4 4     ,   Hip   MMT (-/5)    R L     Flex (L2) 4 4     Ext  4 4     Abd 4 4     ER 4 4     IR 4 4    ,   Knee   MMT (-/5)    R L     Flex 4 4     Ext (L3) 4 4     ,   Ankle/Foot   MMT (-/5)    R L     PF 4 4     DF (L4) 4 4     Inversion 4 4     Eversion 4 4     Grt Toe Ext (L5) 4 4       Flexibility:      Neurological:  Coordination:  Finger to Nose: WNL   Pronation/Supination: slow speed   Toe Tap: WNL     Sensation:  Light Touch:     Sharp: intact   Vibration: intact   Proprioception: intact      Deep Tendon Reflexes: WNL except     Tone: WNL except       Pathological Reflexes:  Babinski: absent   Clonus: absent   Contreras's Sign:            Balance and Functional Mobility:  Mobility / Transfers Level of Assistance   Bed Mobility IND   Supine --> Sit IND   Sit --> Supine IND   Sit --> Stand     Stand --> Sit     Chair --> Chair IND     Postural Control:  Romberg EO: level surface  ; compliant surface       Romberg EC: level surface  ; compliant surface    Tandem Stance: R back: 2 sec; L back: 3 sec; Fall Risk: Yes  SLS: R: 3 sec; L: 3 sec; Fall Risk: Yes    Gait: pt ambulates on level ground with decreased stance phase; decreased step length; shuffle  Timed Up and Go (AD,time):  ; Fall Risk:    Dynamic Gait Index Score:  ; Fall Risk:    5x Sit -->Stand: 18 sec; Fall Risk: Yes  Morrissey Balance  38/56   Fall Risk: Yes       Today's Treatment and Response:   Pt education was provided on exam findings, treatment diagnosis, treatment plan, expectations, and prognosis.  Today's Treatment       3/7/2025   Neuro Treatment   Therapeutic Exercise Minutes 15   Evaluation Minutes 35   Total Time Of Timed Procedures 15   Total  Time Of Service-Based Procedures 35   Total Treatment Time 50   HEP More tired than normal   Access Code: [email or web address]  URL: [email or web address]  Date: 03/07/2025  Prepared by: Cheryle Rodríguez    Exercises  - Single Leg Balance  - 1 x daily - 7 x weekly - 2 sets - 10 reps  - March in Place  - 1 x daily - 7 x weekly - 2 sets - 10 reps  - Heel Raises with Counter Support  - 1 x daily - 7 x weekly - 2 sets - 10 reps                Patient was instructed in and issued a HEP for: More tired than normal   Access Code: [email or web address]  URL: [email or web address]  Date: 03/07/2025  Prepared by: Cheryle Rodríguez    Exercises  - Single Leg Balance  - 1 x daily - 7 x weekly - 2 sets - 10 reps  - March in Place  - 1 x daily - 7 x weekly - 2 sets - 10 reps  - Heel Raises with Counter Support  - 1 x daily - 7 x weekly - 2 sets - 10 reps            Charges:  PT EVAL: High Complexity, te1  In agreement with evaluation findings and clinical rationale, this evaluation involved HIGH COMPLEXITY decision making due to 3 or more personal factors/comorbidities, 4 or more body structures involved/activity limitations, and unstable symptoms as documented in the evaluation.                                                        PLAN OF CARE:    Goals: (to be met in 10 visits)        Frequency / Duration: Patient will be seen 2x/week or a total of 10 visits over a 90 day period. Treatment will include: Manual Therapy; Neuromuscular Re-education; Gait training; Therapeutic Activities; Therapeutic Exercise; Home Exercise Program instruction; Self-Care Home Management    Education or treatment limitation: None   Rehab Potential: good     FES Score  Score: (Patient-Rptd) 45.31 % (3/3/2025  7:41 PM)    Score and level of concern: (Patient-Rptd) 29 - high concern (3/3/2025  7:41 PM)      Patient/Family/Caregiver was advised of these findings, precautions, and treatment options and has agreed to actively participate in planning  and for this course of care.    Thank you for your referral. Please co-sign or sign and return this letter via fax as soon as possible to [phone]. If you have any questions, please contact me at Dept: [phone]    Sincerely,  Electronically signed by therapist: Cheryle Rodríguez PT  Physician's certification required: Yes  I certify the need for these services furnished under this plan of treatment and while under my care.    X___________________________________________________ Date____________________    Certification From: 3/7/2025  To:6/7/2025

## 2025-03-11 ENCOUNTER — OFFICE VISIT (OUTPATIENT)
Dept: PHYSICAL THERAPY | Facility: HOSPITAL | Age: 65
End: 2025-03-11
Attending: FAMILY MEDICINE
Payer: COMMERCIAL

## 2025-03-11 PROCEDURE — 97112 NEUROMUSCULAR REEDUCATION: CPT | Performed by: PHYSICAL THERAPIST

## 2025-03-11 NOTE — PROGRESS NOTES
Patient: Alexa Wolf (64 year old, female) Referring Provider:  Insurance:   Diagnosis: Syncope, unspecified syncope type (R55)  Gait instability (R26.81)  Fibromuscular dysplasia (I77.3)  Weakness (R53.1) Madaimaeve Stephens  [email or web address]   Date of Surgery: No data recorded Next MD visit:  N/A   Precautions:  Fall Risk No data recorded Referral Information:    Date of Evaluation: Req: 0, Auth: 0, Exp:     03/07/25 POC Auth Visits:  8       Today's Date   3/11/2025    Subjective  States that she still feels woozy and foggy sometimes, but not any worse.       Pain: 0/10     Objective  seen for first treament.   Walking , head turns today . increased elements of fogginess.  Also noted reports of tingling in her mouth.   /68 sitting          Assessment  Did not lose balance but did have noted subjecitve reports of increased brain fogginess, discomfort and tingling in her mouth after completing head turns with walking.  Tandem walks were ok    Goals (to be met in 10 visits)    Not Met Progress Toward Partially Met Met   Pt will demonstrate improved SLS to >10 seconds NEO to promote safety and decrease risk of falls on uneven surfaces such as grass and gravel. [] [] [] []   Pt will perform TUG in <17 seconds with least restrictive AD, demonstrating improved gait speed for community ambulation. [] [] [] []   Pt will perform Morrissey Balance with > or greater with least restrictive AD to demonstrate ability to ambulate safely in crowded and busy environments such as the grocery store. [] [] [] []   Pt will be able to squat to  light objects around the house without loss of stability. [] [] [] []   Pt will improve functional hip strength to demonstrate ability to ascend/descend 1 flight of stairs reciprocally without use of handrail. [] [] [] []   Pt will be independent and compliant with comprehensive HEP to maintain progress achieved in PT. [] [] [] []         Plan  Reassess next  visit.    Treatment Last 4 Visits       3/11/2025   PT Treatment   Treatment Day 2          3/7/2025 3/11/2025   Neuro Treatment   Treatment Day  2   Therapeutic Exercise  NuStep x5' res 5    Neuro Re-Education  Tandem walk with looking down and finger tip support x3 laps   Walk with head turns x3 laps /stopped due to fogginess  SB x20 bridge   SB LBR   Pilates ring x20   BW Blue TC x10 sba   Side steps Blue TC x5   Balance board x2' each direction FT hold    Therapeutic Exercise Minutes 15    Neuro Re-Educ Minutes  42   Evaluation Minutes 35    Total Time Of Timed Procedures 15 42   Total Time Of Service-Based Procedures 35 0   Total Treatment Time 50 42   HEP More tired than normal   Access Code: [email or web address]  URL: [email or web address]  Date: 03/07/2025  Prepared by: Cheryle Rodríguez    Exercises  - Single Leg Balance  - 1 x daily - 7 x weekly - 2 sets - 10 reps  - March in Place  - 1 x daily - 7 x weekly - 2 sets - 10 reps  - Heel Raises with Counter Support  - 1 x daily - 7 x weekly - 2 sets - 10 reps                 HEP  More tired than normal   Access Code: [email or web address]  URL: [email or web address]  Date: 03/07/2025  Prepared by: Cheryle Rodríguez    Exercises  - Single Leg Balance  - 1 x daily - 7 x weekly - 2 sets - 10 reps  - March in Place  - 1 x daily - 7 x weekly - 2 sets - 10 reps  - Heel Raises with Counter Support  - 1 x daily - 7 x weekly - 2 sets - 10 reps            Charges  3 NMR

## 2025-03-12 ENCOUNTER — TELEPHONE (OUTPATIENT)
Dept: FAMILY MEDICINE CLINIC | Facility: CLINIC | Age: 65
End: 2025-03-12

## 2025-03-12 DIAGNOSIS — R42 VERTIGO: Primary | ICD-10-CM

## 2025-03-14 ENCOUNTER — APPOINTMENT (OUTPATIENT)
Dept: PHYSICAL THERAPY | Facility: HOSPITAL | Age: 65
End: 2025-03-14
Attending: FAMILY MEDICINE
Payer: COMMERCIAL

## 2025-03-17 ENCOUNTER — OFFICE VISIT (OUTPATIENT)
Dept: PHYSICAL THERAPY | Facility: HOSPITAL | Age: 65
End: 2025-03-17
Attending: FAMILY MEDICINE
Payer: COMMERCIAL

## 2025-03-17 ENCOUNTER — PATIENT MESSAGE (OUTPATIENT)
Dept: FAMILY MEDICINE CLINIC | Facility: CLINIC | Age: 65
End: 2025-03-17

## 2025-03-17 DIAGNOSIS — H81.90 VESTIBULAR DISORDER, UNSPECIFIED LATERALITY: ICD-10-CM

## 2025-03-17 DIAGNOSIS — R42 VERTIGO: Primary | ICD-10-CM

## 2025-03-17 PROCEDURE — 97162 PT EVAL MOD COMPLEX 30 MIN: CPT

## 2025-03-17 PROCEDURE — 97112 NEUROMUSCULAR REEDUCATION: CPT

## 2025-03-17 NOTE — PROGRESS NOTES
NEUROLOGICAL EVALUATION:     Diagnosis:   Vertigo (R42), Vestibular disorder, unspecified laterality (H81.90) Patient:  Alexa Wolf (64 year old, female)        Referring Provider: Madai Stephens  Today's Date   3/17/2025    Precautions:  Fall Risk   Date of Evaluation: 03/17/25  Next MD visit: No data recorded  Date of Surgery: No data recorded     PATIENT SUMMARY   Summary of chief complaints: Head fogginess. Dizziness  History of current condition: Pt reports she passed out in november. Fell and hit her head. Went to ER and got MRI, CT, echos, and multiple other tests done.  All normal. Unable to determine cause of syncope. Since then, she has felt a sense of dizziness. Describes symptoms as feeling like she has been drinking. Has had a few instances of near falls due to symptoms/LOB. symptoms are constant. Head turns make it worse. Denies changes in hearing or visions. History of migranes, but not increasing since fall. Has had some N/T in togue. Denies N/T in hands or changes in dexterity or neck pain.   Pain level: current 5 /10, at best 5 /10, at worst 5 /10  Description of symptoms: Fogginess in head. Imbalance   Occupation: retired   Leisure activities/Hobbies: Walks dog, housework.   Prior level of function: independent, no dizziness.  Current limitations: Difficulty walking up stairs, on uneven surfaces, symptoms w/ movement/ head turn. visual reliance for balance.  Pt goals: reduced symptoms.  History of falls: Yes In november. Reports dizziness directly before fall   Home Environment: lives in ranch style home with        Pt denies diplopia, dysarthria, dysphasia, drop attacks, bowel/bladder changes, saddle anesthesia, and NEO LE N/T.  Past medical history was reviewed with Alexa.  Significant findings include:    Imaging/Tests: CT: No acute intracranial abnormality identified.   2. Minimal age-indeterminate microvascular ischemic changes in the cerebral white matter. If there is  clinical concern for acute ischemia/infarction, an MRI of the brain would be recommended for further evaluation.   3. No evidence of intracranial aneurysm, flow-limiting stenosis, or focal arterial occlusion.   4. No evidence of occlusion, acute dissection, or flow-limiting stenosis in the cervical vertebral or carotid arteries. No evidence of hemodynamically significant carotid stenosis by NASCET criteria.   5. Changes of fibromuscular dysplasia in the cervical internal and cervical vertebral arteries as above.        MRI:  No acute intracranial process   2. Mild to moderate burden of T2/FLAIR hyperintense foci within the frontal and parietal subcortical and periventricular white matter consistent small vessel ischemic disease.     3. The IAC's are grossly unremarkable without evidence of mass lesion or abnormal enhancement of the 7th or 8th nerves.   Alexa  has a past medical history of Acute pharyngitis (06/29/2012), Acute sinusitis, unspecified (06/29/2012), Allergy, unspecified not elsewhere classified (06/29/2012), Anxiety, Chest pain, unspecified (06/29/2012), Crohn's disease (HCC), IBS (irritable bowel syndrome), Obesity, Regional enteritis of unspecified site (06/29/2012), and Wears glasses (1968).  She  has a past surgical history that includes colonoscopy with biopsy (6/30/2011); colonoscopy (3/14/14); upper gi endoscopy performed; other surgical history (5/8/2009); other surgical history; and colonoscopy (Nov. 2017).    ASSESSMENT  Alexa presents to physical therapy evaluation with primary c/o Head fogginess. Dizizness. The results of the objective tests and measures show Dizziness w/ VORC, impaired balance w/ EC.. Functional deficits include but are not limited to Difficulty walking up stairs, on uneven surfaces, symptoms w/ movement/ head turn. visual reliance for balance.. Signs and symptoms are consistent with diagnosis of Vertigo (R42), Vestibular disorder, unspecified laterality (H81.90). Pt and PT  discussed evaluation findings, pathology, POC and HEP.  Pt voiced understanding and performs HEP correctly without reported pain. Skilled Physical Therapy is medically necessary to address the above impairments and reach functional goals.    OBJECTIVE:    Musculoskeletal:  Observation/Posture: forward head, rounded shoulders       Neurological:  Coordination:  Finger to Nose: WNL   Pronation/Supination: WNL   Toe Tap: WNL     Sensation: reports N/T in tongue, no in Ues.          Occulomotor & Vestibulo-Ocular Exam:  Spontaneous Nystagmus: room light: none ;  fixation blocked: NT  Smooth Pursuit: Negative  Saccades: Negative  Gaze Evoked Nystagmus:  room light: Negative; fixation blocked: Not Tested  Head Thrust: (+) on R  VOR screen:  blurriness, no dizziness.   VOR Cancellation: mild symptoms in standing  Convergence: Positive   Cover/Uncover:  Negative  Cross Cover:  Negative  Head Shaking Nystagmus: Not Tested  Dynamic Visual Acuity:  1 line difference      Positional Testing: Not tested this session.          Postural Control:  Romberg EO: level surface 30 sec; compliant surface 12 sec     Romberg EC: level surface 30 sec; compliant surface 15 sec  Partial tandem: 30 sec R/L   Tandem Stance: R back: 30 sec; L back: 30 sec; Fall Risk:    SLS: R: 13 sec; L: 30 sec; Fall Risk:      Gait: pt ambulates on level ground with normal mechanics       Today's Treatment and Response:   Pt education was provided on exam findings, treatment diagnosis, treatment plan, expectations, and prognosis.  Today's Treatment       3/17/2025   Neuro Treatment   Neuro Re-Education HEP review    Neuro Re-Educ Minutes 10   Evaluation Minutes 35   Total Time Of Timed Procedures 10   Total Time Of Service-Based Procedures 35   Total Treatment Time 45   HEP Access Code: [email or web address]  URL: [email or web address]  Date: 03/17/2025  Prepared by: Roxy Solis    Exercises  - Standing VOR Cancellation  - 1 x daily - 7 x weekly - 4  sets - 30 hold  - Romberg Stance with Eyes Closed  - 1 x daily - 7 x weekly - 3 sets - 30 hold        Patient was instructed in and issued a HEP for: Access Code: [email or web address]  URL: [email or web address]  Date: 03/17/2025  Prepared by: Roxy Solis    Exercises  - Standing VOR Cancellation  - 1 x daily - 7 x weekly - 4 sets - 30 hold  - Romberg Stance with Eyes Closed  - 1 x daily - 7 x weekly - 3 sets - 30 hold    Charges:  PT EVAL: Moderate Complexity, x1 neuro re-ed  In agreement with evaluation findings and clinical rationale, this evaluation involved MODERATE COMPLEXITY decision making due to 1-2 personal factors/comorbidities, 3 or more body structures involved/activity limitations, and evolving symptoms as documented in the evaluation.                                                        PLAN OF CARE:    Goals: (to be met in 10 visits)    Not Met Progress  Toward Partially  Met Met   - Within 5 sessions, pt will be consistent and independent w/ HEP, thus facilitating recovery.  [] [] [] []   - Within 5 sessions, pt will report no symptoms or dizziness on a daily basis.  [] [] [] []   -Within 10 session, pt will demonstrate 30+ seconds of balance w/ EC on foam surface w/o dizziness, thus demonstrating reduced visual reliance for balance.  [] [] [] []   - Within 10 sessions, pt will be able to complete 30 seconds of VORC in standing w/o reports of dizziness, allowing her to better tolerate gait w/ head turns.  [] [] [] []    [] [] [] []    [] [] [] []         Frequency / Duration: Patient will be seen 1-2x/week or a total of 10 visits over a 90 day period. Treatment will include: Gait training; Manual Therapy; Mechanical Traction; Neuromuscular Re-education; Self-Care Home Management; Therapeutic Activities; Therapeutic Exercise; Home Exercise Program instruction; Other (use comment) (vestibaular rehab and CRM)    Education or treatment limitation: None   Rehab Potential: good     FES  Score  Score: (Patient-Rptd) 48.44 % (3/14/2025  7:15 PM)    Score and level of concern: (Patient-Rptd) 31 - high concern (3/14/2025  7:15 PM)      Patient/Family/Caregiver was advised of these findings, precautions, and treatment options and has agreed to actively participate in planning and for this course of care.    Thank you for your referral. Please co-sign or sign and return this letter via fax as soon as possible to [phone]. If you have any questions, please contact me at Dept: [phone]    Sincerely,  Electronically signed by therapist: Roxy Solis PT  Physician's certification required: Yes  I certify the need for these services furnished under this plan of treatment and while under my care.    X___________________________________________________ Date____________________    Certification From: 3/17/2025  To:6/15/2025

## 2025-03-18 ENCOUNTER — APPOINTMENT (OUTPATIENT)
Dept: PHYSICAL THERAPY | Facility: HOSPITAL | Age: 65
End: 2025-03-18
Attending: FAMILY MEDICINE
Payer: COMMERCIAL

## 2025-03-18 NOTE — TELEPHONE ENCOUNTER
For the patient Alexa, it is not recommended to get a COVID booster 6 months after the last 1 until age 65.  She will become 65 later this year.  She did get an MMR booster relatively recently so she is labeled that way.  As for her , he would need to do titers if he wants to confirm that he is immune and then we would booster him if he needed to be boostered.  I believe her  is overdue to see me.

## 2025-03-19 ENCOUNTER — OFFICE VISIT (OUTPATIENT)
Dept: PHYSICAL THERAPY | Facility: HOSPITAL | Age: 65
End: 2025-03-19
Attending: FAMILY MEDICINE
Payer: COMMERCIAL

## 2025-03-19 PROCEDURE — 97110 THERAPEUTIC EXERCISES: CPT

## 2025-03-19 PROCEDURE — 97112 NEUROMUSCULAR REEDUCATION: CPT

## 2025-03-19 NOTE — PROGRESS NOTES
Patient: Alexa Wolf (64 year old, female) Referring Provider:  Insurance:   Diagnosis: Vertigo (R42), Vestibular disorder, unspecified laterality (H81.90) "[employer]"   Date of Surgery: No data recorded Next MD visit:  N/A   Precautions:  Fall Risk No data recorded Referral Information:    Date of Evaluation: Req: 0, Auth: 0, Exp:     03/17/25 POC Auth Visits:  8       Today's Date   3/19/2025    Subjective  Patient states that she has intermittent headaches with no known cuase. When she was in the hospital previosuly, she took medication for migraine but has not been taking any medicaiton since then. Symptoms continue to persist, explained as the feeling of unbalanced, like she had a drink. States that symptoms are reletevaly constant and seem to get worse with increased activity.       Pain: 0/10     Objective  3/19/25: negative positional testing (horizontal/right and left posterior), walking VOR cancelation vert/horiz, increased symptoms, resolves quickly         Assessment  Patient tolerated todays session well with walking in // bars with horizontal and vertical VOR cancelation, at first symptoms increased but felt better with second repetition. Inlcuded cervical flexion and upper trap stretch for possible cervicogenic involvement.    Goals (to be met in 10 visits)   Within 5 sessions, patient will be consistent and independent with HEP, thus facilitating recovery   Within 5 sessions, pt will report no symptoms or dizziness on a daily basis.   Within 10 session, pt will demonstrate 30+ seconds of balance w/ EC on foam surface w/o dizziness, thus demonstrating reduced visual reliance for balance.   Within 10 sessions, pt will be able to complete 30 seconds of VORC in standing w/o reports of dizziness, allowing her to better tolerate gait w/ head turns.      Plan  Continue VOR cancelation, habituation exercises as indicated    Treatment Last 4 Visits       3/11/2025  3/19/2025   PT Treatment   Treatment Day 2 3          3/11/2025 3/19/2025   Vestibular Treatment   Treatment Day 2 3   Therapeutic Exercise  - 10x chin tucks   - 2x20\"  cervical flexion stretch   - 2x20\" B upper trap stretch (R side tighter)  - 1x15 B cervical snag       Neuro Re-Education  - 10x pen push ups   - 3x // bars walking with horizontal VOR cancellation   - 2x // bars walking with vertical VOR cancellation    Manual Therapy  - suboccipital release x5mins   Therapeutic Exercise Minutes  25   Neuro Re-Educ Minutes  15   Manual Therapy Minutes  5   Total Time Of Timed Procedures  45   Total Time Of Service-Based Procedures  0   Total Treatment Time  45        HEP       Charges  Thera ex: 2 Neuro re-ed: 1       This treatment was provided by ALICIA Roman under the direct and constant direction and supervision of a licensed therapist, who provided consultation regarding skilled judgements, treatment, and assessment of patient care.

## 2025-03-20 ENCOUNTER — LAB ENCOUNTER (OUTPATIENT)
Dept: LAB | Age: 65
End: 2025-03-20
Attending: FAMILY MEDICINE
Payer: COMMERCIAL

## 2025-03-20 DIAGNOSIS — I82.90 VENOUS THROMBOEMBOLISM: ICD-10-CM

## 2025-03-20 DIAGNOSIS — R76.0 LUPUS ANTICOAGULANT POSITIVE: ICD-10-CM

## 2025-03-20 DIAGNOSIS — D50.9 IRON DEFICIENCY ANEMIA, UNSPECIFIED IRON DEFICIENCY ANEMIA TYPE: ICD-10-CM

## 2025-03-20 DIAGNOSIS — K50.00 CROHN'S DISEASE OF SMALL INTESTINE WITHOUT COMPLICATION (HCC): ICD-10-CM

## 2025-03-20 DIAGNOSIS — D50.0 IRON DEFICIENCY ANEMIA DUE TO CHRONIC BLOOD LOSS: ICD-10-CM

## 2025-03-20 LAB
BASOPHILS # BLD AUTO: 0.07 X10(3) UL (ref 0–0.2)
BASOPHILS NFR BLD AUTO: 0.9 %
DEPRECATED HBV CORE AB SER IA-ACNC: 45 NG/ML
EOSINOPHIL # BLD AUTO: 0.18 X10(3) UL (ref 0–0.7)
EOSINOPHIL NFR BLD AUTO: 2.3 %
ERYTHROCYTE [DISTWIDTH] IN BLOOD BY AUTOMATED COUNT: 12.8 %
HBV SURFACE AB SER QL: REACTIVE
HBV SURFACE AB SERPL IA-ACNC: 255.8 MIU/ML
HCT VFR BLD AUTO: 36.4 %
HGB BLD-MCNC: 12 G/DL
IMM GRANULOCYTES # BLD AUTO: 0.03 X10(3) UL (ref 0–1)
IMM GRANULOCYTES NFR BLD: 0.4 %
IRON SATN MFR SERPL: 29 %
IRON SERPL-MCNC: 93 UG/DL
LYMPHOCYTES # BLD AUTO: 1.53 X10(3) UL (ref 1–4)
LYMPHOCYTES NFR BLD AUTO: 19.4 %
MCH RBC QN AUTO: 30.6 PG (ref 26–34)
MCHC RBC AUTO-ENTMCNC: 33 G/DL (ref 31–37)
MCV RBC AUTO: 92.9 FL
MONOCYTES # BLD AUTO: 0.57 X10(3) UL (ref 0.1–1)
MONOCYTES NFR BLD AUTO: 7.2 %
NEUTROPHILS # BLD AUTO: 5.52 X10 (3) UL (ref 1.5–7.7)
NEUTROPHILS # BLD AUTO: 5.52 X10(3) UL (ref 1.5–7.7)
NEUTROPHILS NFR BLD AUTO: 69.8 %
PLATELET # BLD AUTO: 261 10(3)UL (ref 150–450)
RBC # BLD AUTO: 3.92 X10(6)UL
TOTAL IRON BINDING CAPACITY: 323 UG/DL (ref 250–425)
TRANSFERRIN SERPL-MCNC: 251 MG/DL (ref 250–380)
WBC # BLD AUTO: 7.9 X10(3) UL (ref 4–11)

## 2025-03-20 PROCEDURE — 82728 ASSAY OF FERRITIN: CPT

## 2025-03-20 PROCEDURE — 81240 F2 GENE: CPT

## 2025-03-20 PROCEDURE — 85705 THROMBOPLASTIN INHIBITION: CPT

## 2025-03-20 PROCEDURE — 83550 IRON BINDING TEST: CPT

## 2025-03-20 PROCEDURE — 85610 PROTHROMBIN TIME: CPT

## 2025-03-20 PROCEDURE — 86146 BETA-2 GLYCOPROTEIN ANTIBODY: CPT

## 2025-03-20 PROCEDURE — 85732 THROMBOPLASTIN TIME PARTIAL: CPT

## 2025-03-20 PROCEDURE — 81241 F5 GENE: CPT

## 2025-03-20 PROCEDURE — 86147 CARDIOLIPIN ANTIBODY EA IG: CPT

## 2025-03-20 PROCEDURE — 36415 COLL VENOUS BLD VENIPUNCTURE: CPT

## 2025-03-20 PROCEDURE — 86706 HEP B SURFACE ANTIBODY: CPT

## 2025-03-20 PROCEDURE — 83540 ASSAY OF IRON: CPT

## 2025-03-20 PROCEDURE — 85613 RUSSELL VIPER VENOM DILUTED: CPT

## 2025-03-20 PROCEDURE — 85025 COMPLETE CBC W/AUTO DIFF WBC: CPT

## 2025-03-21 ENCOUNTER — APPOINTMENT (OUTPATIENT)
Dept: PHYSICAL THERAPY | Facility: HOSPITAL | Age: 65
End: 2025-03-21
Attending: FAMILY MEDICINE
Payer: COMMERCIAL

## 2025-03-21 DIAGNOSIS — D50.9 IRON DEFICIENCY ANEMIA, UNSPECIFIED IRON DEFICIENCY ANEMIA TYPE: Primary | ICD-10-CM

## 2025-03-21 LAB
APTT PPP: 44 SECONDS (ref 23–36)
F2 C.20210G>A GENO BLD/T: NORMAL
F5 P.R506Q BLD/T QL: NORMAL
INR BLD: 0.99 (ref 0.85–1.16)
LA 3 SCREEN W REFLEX-IMP: POSITIVE
PROTHROMBIN TIME: 13.2 SECONDS (ref 11.6–14.8)
SCREEN DRVVT: 1.22 S (ref 0–1.29)
SCREEN DRVVT: NEGATIVE S
STACLOT LA DELTA: 13.1 SECONDS (ref ?–8)

## 2025-03-22 LAB
B2 GLYCOPROT1 IGG SERPL IA-ACNC: 1.3 U/ML (ref ?–7)
B2 GLYCOPROT1 IGM SERPL IA-ACNC: <2.4 U/ML (ref ?–7)
CARDIOLIPIN IGG SERPL-ACNC: 1.6 GPL (ref ?–10)
CARDIOLIPIN IGM SERPL-ACNC: 2 MPL (ref ?–10)

## 2025-03-25 ENCOUNTER — OFFICE VISIT (OUTPATIENT)
Dept: PHYSICAL THERAPY | Facility: HOSPITAL | Age: 65
End: 2025-03-25
Attending: FAMILY MEDICINE
Payer: COMMERCIAL

## 2025-03-25 PROCEDURE — 97112 NEUROMUSCULAR REEDUCATION: CPT

## 2025-03-31 ENCOUNTER — OFFICE VISIT (OUTPATIENT)
Dept: PHYSICAL THERAPY | Facility: HOSPITAL | Age: 65
End: 2025-03-31
Attending: FAMILY MEDICINE
Payer: COMMERCIAL

## 2025-03-31 PROCEDURE — 97112 NEUROMUSCULAR REEDUCATION: CPT

## 2025-03-31 PROCEDURE — 97110 THERAPEUTIC EXERCISES: CPT

## 2025-03-31 NOTE — PROGRESS NOTES
Patient: Alexa Wolf (64 year old, female) Referring Provider:  Insurance:   Diagnosis: Vertigo (R42), Vestibular disorder, unspecified laterality (H81.90) [employer]   Date of Surgery: No data recorded Next MD visit:  N/A   Precautions:  Fall Risk No data recorded Referral Information:    Date of Evaluation: Req: 0, Auth: 0, Exp:     03/17/25 POC Auth Visits:  8       Today's Date   3/31/2025    Subjective  Patient states that tuesday and wednesday she felt symptoms of lightheadedness and maybe some imbalance in the morning and seems somewhat random. Otherwise she feels pretty good, symptoms have not been present as much as before. Per patient, she feels like the exercises have been helpful. Headache frequency has decreased       Pain: 0/10     Objective  3/19/25: negative positional testing (horizontal/right and left posterior), walking VOR cancelation vert/horiz, increased symptoms, resolves quickly         Assessment  Pt tolerated todays sesion well, able to ambulate on the treadmill with head turns with no LOB or slowed tamar, however dizziness symptoms increased following 7 minutes. Progressed chin tucks with supine chin tucks with neck flexion, which patient was able to complete, struggled with endurance of 5s hold, suggesting need to continue strengthening deep neck flexors.    Goals (to be met in 10 visits)   Within 5 sessions, patient will be consistent and independent with HEP, thus facilitating recovery   Within 5 sessions, pt will report no symptoms or dizziness on a daily basis.   Within 10 session, pt will demonstrate 30+ seconds of balance w/ EC on foam surface w/o dizziness, thus demonstrating reduced visual reliance for balance.   Within 10 sessions, pt will be able to complete 30 seconds of VORC in standing w/o reports of dizziness, allowing her to better tolerate gait w/ head turns.          Plan  Continue habituation exercises, neck stabilization as  indicated    Treatment Last 4 Visits  Treatment Day: 5       3/7/2025 3/11/2025 3/17/2025 3/31/2025   Neuro Treatment   Therapeutic Exercise  NuStep x5' res 5   - 2x10 w/ 5s hold supine chin tuck with lift off   - 2x10 GTB rows with scapular squeeze   - 1x10 RTB horizontal abduction   - 1x10 RTB B diagonal horizontal abduction     Neuro Re-Education  Tandem walk with looking down and finger tip support x3 laps   Walk with head turns x3 laps /stopped due to fogginess  SB x20 bridge   SB LBR   Pilates ring x20   BW Blue TC x10 sba   Side steps Blue TC x5   Balance board x2' each direction FT hold  HEP review  - 1.8m/s treadmill walking x7 mins, 2mins horizontal head turns, 2 mins vertical head turns (dizzy after stepping off)  - 5x seated grounding   - 10x seated pen push ups (doubled >4in)  - 4x tandem walk fwd/bwd, attempting no HHA  - tandem stance on airex w/ horizontal head turns and vertical head turns   - tandem walking on airex in // bars   - 2x10 wobble board in // bars, attempting no UES   Therapeutic Exercise Minutes 15   15   Neuro Re-Educ Minutes  42 10 30   Evaluation Minutes 35  35    Total Time Of Timed Procedures 15 42 10 45   Total Time Of Service-Based Procedures 35 0 35 0   Total Treatment Time 50 42 45 45   HEP More tired than normal   Access Code: [email or web address]  URL: [email or web address]  Date: 03/07/2025  Prepared by: Cheryle Rodríguez    Exercises  - Single Leg Balance  - 1 x daily - 7 x weekly - 2 sets - 10 reps  - March in Place  - 1 x daily - 7 x weekly - 2 sets - 10 reps  - Heel Raises with Counter Support  - 1 x daily - 7 x weekly - 2 sets - 10 reps          Access Code: [email or web address]  URL: [email or web address]  Date: 03/17/2025  Prepared by: Roxy Solis    Exercises  - Standing VOR Cancellation  - 1 x daily - 7 x weekly - 4 sets - 30 hold  - Romberg Stance with Eyes Closed  - 1 x daily - 7 x weekly - 3 sets - 30 hold         HEP  Access Code: [email or web address]  URL:  [email or web address]  Date: 03/17/2025  Prepared by: Roxy Solis    Exercises  - Standing VOR Cancellation  - 1 x daily - 7 x weekly - 4 sets - 30 hold  - Romberg Stance with Eyes Closed  - 1 x daily - 7 x weekly - 3 sets - 30 hold    Charges  thera ex:1, Neuro re-ed:2       This treatment was provided by ALICIA Roman under the direct and constant direction and supervision of a licensed therapist, who provided consultation regarding skilled judgements, treatment, and assessment of patient care.

## 2025-04-07 ENCOUNTER — APPOINTMENT (OUTPATIENT)
Dept: PHYSICAL THERAPY | Facility: HOSPITAL | Age: 65
End: 2025-04-07
Attending: FAMILY MEDICINE
Payer: COMMERCIAL

## 2025-04-07 DIAGNOSIS — R79.89 ABNORMAL THYROID BLOOD TEST: ICD-10-CM

## 2025-04-07 PROCEDURE — 97112 NEUROMUSCULAR REEDUCATION: CPT

## 2025-04-07 NOTE — PROGRESS NOTES
Patient: Alexa Wolf (64 year old, female) Referring Provider:  Insurance:   Diagnosis: Vertigo (R42), Vestibular disorder, unspecified laterality (H81.90) [employer]   Date of Surgery: No data recorded Next MD visit:  N/A   Precautions:  Fall Risk No data recorded Referral Information:    Date of Evaluation: Req: 0, Auth: 0, Exp:     03/17/25 POC Auth Visits:  8       Today's Date   4/7/2025    Subjective  Pt states overall she has been doing better. less HA and dizziness. Guera a couple HAs the past week.       Pain: 0/10     Objective              Occulomotor & Vestibulo-Ocular Exam:  Spontaneous Nystagmus: room light: none ;  fixation blocked: NT  Smooth Pursuit: Negative  Saccades: Negative  Gaze Evoked Nystagmus:  room light: Negative; fixation blocked: Not Tested  Head Thrust: (+) on R  VOR screen:  blurriness, no dizziness.   VOR Cancellation: mild symptoms in standing  Convergence: Positive   Cover/Uncover:  Negative  Cross Cover:  Negative  Head Shaking Nystagmus: Not Tested  Dynamic Visual Acuity:  1 line difference            Postural Control: re-assessed 4/7  Romberg EO: level surface 30 sec; compliant surface 30 sec     Romberg EC: level surface 30 sec; compliant surface 30  sec  Partial tandem: 30 sec R/L   Tandem Stance: R back: 30 sec; L back: 30 sec; Fall Risk:    SLS: R: 26 sec; L: 30 sec; Fall Risk:     Assessment  Pt tolerated session well. Continues to have mild dizziness w/ head turns- vertical > horizontal. Balance re-assessed this session and pt demonstrates improvement since IE. additional DNF strength incorporated into session.    Goals (to be met in 10 visits)    Not Met Progress  Toward Partially  Met Met   - Within 5 sessions, pt will be consistent and independent w/ HEP, thus facilitating recovery.  [] [] [] []   - Within 5 sessions, pt will report no symptoms or dizziness on a daily basis.  [] [] [] []   -Within 10 session, pt will demonstrate 30+  seconds of balance w/ EC on foam surface w/o dizziness, thus demonstrating reduced visual reliance for balance.  [] [] [] []   - Within 10 sessions, pt will be able to complete 30 seconds of VORC in standing w/o reports of dizziness, allowing her to better tolerate gait w/ head turns.  [] [] [] []    [] [] [] []    [] [] [] []                 Plan  1 more session scheduled. DC or add more sessions    Treatment Last 4 Visits  Treatment Day: 6       3/19/2025 3/25/2025 4/7/2025   Vestibular Treatment   Therapeutic Exercise - 10x chin tucks   - 2x20\"  cervical flexion stretch   - 2x20\" B upper trap stretch (R side tighter)  - 1x15 B cervical snag         Neuro Re-Education - 10x pen push ups   - 3x // bars walking with horizontal VOR cancellation   - 2x // bars walking with vertical VOR cancellation  X10 chin tucks   X10 pen push-ups   Standing VORC w/ busy background- x30 sec horizontal and vertical   Gait w/ head turns and target taps - horizontal x6 rounds of 10 ft   Gait w/ EC- x4 rounds of 10 ft   Tandem walk- 4x10 ft   D2 flexion w/ RTB and VORC - 2x10   Head turns on airex- x40 sec horizontal and vertical.  VORC in sitting x 30 vertical and horizontal   STS w/ OH ball lifts w/ VORC x10   STS w/ ball rotations w/ VORC x10   Ball toss w/ head turns on airex x 3 min   Cross body target taps - diagonal 2x1 min   Gait w/ EC x 1 lap // bars   Tandem gait x 1 lap // bars   D2 flexion w/ GTB 2x12 R/L w/ VORC  Push-up plus w/ chin retraction hold x 10    Manual Therapy - suboccipital release x5mins     Therapeutic Exercise Minutes 25     Neuro Re-Educ Minutes 15 40 40   Manual Therapy Minutes 5     Total Time Of Timed Procedures 45 40 40   Total Time Of Service-Based Procedures 0 0 0   Total Treatment Time 45 40 40   HEP Access Code: [email or web address]  URL: [email or web address]  Date: 03/19/2025  Prepared by: Mai Dotson    Exercises  - Standing VOR Cancellation  - 1 x daily - 7 x weekly - 4 sets - 30  hold  - Romberg Stance with Eyes Closed  - 1 x daily - 7 x weekly - 3 sets - 30 hold  - Seated Cervical Retraction  - 1 x daily - 7 x weekly - 2 sets - 10 reps  - Seated Cervical Flexion Stretch with Finger Support Behind Neck  - 1 x daily - 7 x weekly - 2 sets - 30 sec hold          HEP  Access Code: [email or web address]  URL: [email or web address]  Date: 03/19/2025  Prepared by: Mai Dotson    Exercises  - Standing VOR Cancellation  - 1 x daily - 7 x weekly - 4 sets - 30 hold  - Romberg Stance with Eyes Closed  - 1 x daily - 7 x weekly - 3 sets - 30 hold  - Seated Cervical Retraction  - 1 x daily - 7 x weekly - 2 sets - 10 reps  - Seated Cervical Flexion Stretch with Finger Support Behind Neck  - 1 x daily - 7 x weekly - 2 sets - 30 sec hold    Charges  x3 NR

## 2025-04-07 NOTE — PROGRESS NOTES
Education Record    Learner:  Patient    Disease / Diagnosis: Crohn's Disease    Barriers / Limitations:  None   Comments:    Method:  Brief focused and Reinforcement   Comments:    General Topics:  Plan of care reviewed   Comments:    Outcome:  Shows unde lactated ringers 30mL/hr

## 2025-04-08 RX ORDER — LEVOTHYROXINE SODIUM 25 UG/1
25 TABLET ORAL
Qty: 90 TABLET | Refills: 1 | OUTPATIENT
Start: 2025-04-08

## 2025-04-16 ENCOUNTER — APPOINTMENT (OUTPATIENT)
Dept: PHYSICAL THERAPY | Facility: HOSPITAL | Age: 65
End: 2025-04-16
Attending: FAMILY MEDICINE
Payer: COMMERCIAL

## 2025-04-16 PROCEDURE — 97112 NEUROMUSCULAR REEDUCATION: CPT

## 2025-04-16 NOTE — PROGRESS NOTES
Discharge Summary  Pt has attended 7 visits in Physical Therapy.    Patient: Alexa Wolf (64 year old, female) Referring Provider:  Insurance:   Diagnosis: Vertigo (R42), Vestibular disorder, unspecified laterality (H81.90) [employer]   Date of Surgery: No data recorded Next MD visit:  N/A   Precautions:  Fall Risk No data recorded Referral Information:    Date of Evaluation: Req: 0, Auth: 0, Exp:     03/17/25 POC Auth Visits:  8       Today's Date   4/16/2025    Subjective  pt reports that sometimes she feels like symptoms are better and then goes through periods where she feels like she is off, these symptoms seem to last hours or less. Reports that lightheadedness symptoms are mostly in the morning and seem to get better throughout the day. Additionally, gets symptoms with sit-to-stand.  Symptoms are not present everyday which has improved from the start of therapy. pt reports she does not take blood pressure at home, but is looking into buying one for home due to lightheadedness symptoms and cardiologist recommendation.       Pain: pain not reported     Objective  Convergence: unsymptomatic EC foam: 30s, no increase in symptoms VORC: able to complete 30s with mild increase in symptoms when stopped          Assessment  pt has met or is progressing towards meeting all therapy goals, suggesting improvements in vestibular system function. PT and pt discussed DC from therapy and continuing with home managemetn. Pt has been compliant with HEP and will continue with exercises to maintain progress made in PT.    Goals (to be met in 10 visits)   Within 5 sessions, patient will be consistent and independent with HEP, thus facilitating recovery - Met  Within 5 sessions, pt will report no symptoms or dizziness on a daily basis. - Progressing towards (intermittent symptoms)  Within 10 session, pt will demonstrate 30+ seconds of balance w/ EC on foam surface w/o dizziness, thus  demonstrating reduced visual reliance for balance. - Met  Within 10 sessions, pt will be able to complete 30 seconds of VORC in standing w/o reports of dizziness, allowing her to better tolerate gait w/ head turns. - progressing towards (slight symptoms when stopped)     Post FES Score  Post Score: (Patient-Rptd) 45.31 % (4/16/2025  1:08 PM)    Score and level of concern (post): (Patient-Rptd) 29 - high concern (4/16/2025  1:08 PM)    3.13 % improvement    Plan: pt to maintain progress made in PT with progressed HEP    This treatment was provided by ALICIA Roman under the direct and constant direction and supervision of a licensed therapist, who provided consultation regarding skilled judgements, treatment, and assessment of patient care.      Patient/Family/Caregiver was advised of these findings, precautions, and treatment options and has agreed to actively participate in planning and for this course of care.    Thank you for your referral. If you have any questions, please contact me at Dept: [phone].    Sincerely,  Electronically signed by therapist: Nayana Franks     Physician's certification required:  Yes  Please co-sign or sign and return this letter via fax as soon as possible to [phone].   I certify the need for these services furnished under this plan of treatment and while under my care.    X___________________________________________________ Date____________________    Certification From: 4/16/2025  To:7/15/2025      Treatment Last 4 Visits  Treatment Day: 7       3/19/2025 3/25/2025 4/7/2025 4/16/2025   Vestibular Treatment   Therapeutic Exercise - 10x chin tucks   - 2x20\"  cervical flexion stretch   - 2x20\" B upper trap stretch (R side tighter)  - 1x15 B cervical snag          Neuro Re-Education - 10x pen push ups   - 3x // bars walking with horizontal VOR cancellation   - 2x // bars walking with vertical VOR cancellation  X10 chin tucks   X10 pen push-ups   Standing VORC w/ busy  background- x30 sec horizontal and vertical   Gait w/ head turns and target taps - horizontal x6 rounds of 10 ft   Gait w/ EC- x4 rounds of 10 ft   Tandem walk- 4x10 ft   D2 flexion w/ RTB and VORC - 2x10   Head turns on airex- x40 sec horizontal and vertical.  VORC in sitting x 30 vertical and horizontal   STS w/ OH ball lifts w/ VORC x10   STS w/ ball rotations w/ VORC x10   Ball toss w/ head turns on airex x 3 min   Cross body target taps - diagonal 2x1 min   Gait w/ EC x 1 lap // bars   Tandem gait x 1 lap // bars   D2 flexion w/ GTB 2x12 R/L w/ VORC  Push-up plus w/ chin retraction hold x 10  - Reassess objective findings, assess goals, discuss findings/ progress made, POC, HEP and HEP progression    Manual Therapy - suboccipital release x5mins      Therapeutic Exercise Minutes 25      Neuro Re-Educ Minutes 15 40 40 30   Manual Therapy Minutes 5      Total Time Of Timed Procedures 45 40 40 30   Total Time Of Service-Based Procedures 0 0 0 0   Total Treatment Time 45 40 40 30   HEP Access Code: [email or web address]  URL: [email or web address]  Date: 03/19/2025  Prepared by: Mai Dotson    Exercises  - Standing VOR Cancellation  - 1 x daily - 7 x weekly - 4 sets - 30 hold  - Romberg Stance with Eyes Closed  - 1 x daily - 7 x weekly - 3 sets - 30 hold  - Seated Cervical Retraction  - 1 x daily - 7 x weekly - 2 sets - 10 reps  - Seated Cervical Flexion Stretch with Finger Support Behind Neck  - 1 x daily - 7 x weekly - 2 sets - 30 sec hold           HEP  Access Code: [email or web address]  URL: [email or web address]  Date: 03/19/2025  Prepared by: Mai Dotson    Exercises  - Standing VOR Cancellation  - 1 x daily - 7 x weekly - 4 sets - 30 hold  - Romberg Stance with Eyes Closed  - 1 x daily - 7 x weekly - 3 sets - 30 hold  - Seated Cervical Retraction  - 1 x daily - 7 x weekly - 2 sets - 10 reps  - Seated Cervical Flexion Stretch with Finger Support Behind Neck  - 1 x daily - 7 x weekly - 2  sets - 30 sec hold    Charges  Neuro re-ed:2

## 2025-04-21 ENCOUNTER — APPOINTMENT (OUTPATIENT)
Dept: PHYSICAL THERAPY | Facility: HOSPITAL | Age: 65
End: 2025-04-21
Attending: FAMILY MEDICINE
Payer: COMMERCIAL

## 2025-06-23 PROBLEM — H25.13 AGE-RELATED NUCLEAR CATARACT, BILATERAL: Status: ACTIVE | Noted: 2025-06-23

## 2025-06-23 PROBLEM — H04.123 DRY EYES: Status: ACTIVE | Noted: 2025-06-23

## 2025-06-24 ENCOUNTER — OFFICE VISIT (OUTPATIENT)
Dept: FAMILY MEDICINE CLINIC | Facility: CLINIC | Age: 65
End: 2025-06-24
Payer: COMMERCIAL

## 2025-06-24 VITALS
BODY MASS INDEX: 27.32 KG/M2 | HEART RATE: 65 BPM | SYSTOLIC BLOOD PRESSURE: 118 MMHG | HEIGHT: 66 IN | WEIGHT: 170 LBS | OXYGEN SATURATION: 97 % | RESPIRATION RATE: 18 BRPM | DIASTOLIC BLOOD PRESSURE: 78 MMHG

## 2025-06-24 DIAGNOSIS — I77.3 FIBROMUSCULAR DYSPLASIA: ICD-10-CM

## 2025-06-24 DIAGNOSIS — Z71.85 VACCINE COUNSELING: ICD-10-CM

## 2025-06-24 DIAGNOSIS — S89.91XA INJURY OF RIGHT KNEE, INITIAL ENCOUNTER: ICD-10-CM

## 2025-06-24 DIAGNOSIS — K50.80 CROHN'S DISEASE OF BOTH SMALL AND LARGE INTESTINE WITHOUT COMPLICATION (HCC): ICD-10-CM

## 2025-06-24 DIAGNOSIS — K21.9 GASTROESOPHAGEAL REFLUX DISEASE, UNSPECIFIED WHETHER ESOPHAGITIS PRESENT: ICD-10-CM

## 2025-06-24 DIAGNOSIS — E03.9 ACQUIRED HYPOTHYROIDISM: ICD-10-CM

## 2025-06-24 DIAGNOSIS — Z00.00 ROUTINE GENERAL MEDICAL EXAMINATION AT A HEALTH CARE FACILITY: Primary | ICD-10-CM

## 2025-06-24 DIAGNOSIS — Z79.899 MEDICATION MANAGEMENT: ICD-10-CM
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
## 2025-06-24 DIAGNOSIS — Z83.3 FAMILY HISTORY OF DIABETES MELLITUS: ICD-10-CM

## 2025-06-24 DIAGNOSIS — I26.99 PE (PULMONARY THROMBOEMBOLISM) (HCC): ICD-10-CM

## 2025-06-24 DIAGNOSIS — Z12.4 SCREENING FOR MALIGNANT NEOPLASM OF CERVIX: ICD-10-CM

## 2025-06-24 DIAGNOSIS — Z00.00 LABORATORY EXAMINATION ORDERED AS PART OF A ROUTINE GENERAL MEDICAL EXAMINATION: ICD-10-CM

## 2025-06-24 DIAGNOSIS — D50.9 IRON DEFICIENCY ANEMIA, UNSPECIFIED IRON DEFICIENCY ANEMIA TYPE: ICD-10-CM

## 2025-06-24 DIAGNOSIS — Z13.89 SCREENING FOR GENITOURINARY CONDITION: ICD-10-CM

## 2025-06-24 DIAGNOSIS — E55.9 VITAMIN D DEFICIENCY: ICD-10-CM

## 2025-06-24 PROBLEM — R56.9 SEIZURE (HCC): Status: RESOLVED | Noted: 2024-11-14 | Resolved: 2025-06-24

## 2025-06-24 PROBLEM — R55 SYNCOPE: Status: RESOLVED | Noted: 2024-11-14 | Resolved: 2025-06-24

## 2025-06-24 PROBLEM — R51.9 ACUTE NONINTRACTABLE HEADACHE, UNSPECIFIED HEADACHE TYPE: Status: RESOLVED | Noted: 2024-11-14 | Resolved: 2025-06-24

## 2025-06-24 LAB
ALBUMIN SERPL-MCNC: 4.7 G/DL (ref 3.2–4.8)
ALBUMIN/GLOB SERPL: 1.6 {RATIO} (ref 1–2)
ALP LIVER SERPL-CCNC: 72 U/L (ref 50–130)
ALT SERPL-CCNC: 22 U/L (ref 10–49)
ANION GAP SERPL CALC-SCNC: 9 MMOL/L (ref 0–18)
AST SERPL-CCNC: 29 U/L (ref ?–34)
BILIRUB SERPL-MCNC: 0.5 MG/DL (ref 0.2–1.1)
BILIRUB UR QL STRIP.AUTO: NEGATIVE
BUN BLD-MCNC: 7 MG/DL (ref 9–23)
CALCIUM BLD-MCNC: 9.2 MG/DL (ref 8.7–10.6)
CHLORIDE SERPL-SCNC: 98 MMOL/L (ref 98–112)
CHOLEST SERPL-MCNC: 144 MG/DL (ref ?–200)
CLARITY UR REFRACT.AUTO: CLEAR
CO2 SERPL-SCNC: 26 MMOL/L (ref 21–32)
CREAT BLD-MCNC: 0.75 MG/DL (ref 0.55–1.02)
EGFRCR SERPLBLD CKD-EPI 2021: 89 ML/MIN/1.73M2 (ref 60–?)
EST. AVERAGE GLUCOSE BLD GHB EST-MCNC: 117 MG/DL (ref 68–126)
FASTING PATIENT LIPID ANSWER: NO
FASTING STATUS PATIENT QL REPORTED: NO
GLOBULIN PLAS-MCNC: 3 G/DL (ref 2–3.5)
GLUCOSE BLD-MCNC: 80 MG/DL (ref 70–99)
GLUCOSE UR STRIP.AUTO-MCNC: NORMAL MG/DL
HBA1C MFR BLD: 5.7 % (ref ?–5.7)
HDLC SERPL-MCNC: 63 MG/DL (ref 40–59)
KETONES UR STRIP.AUTO-MCNC: NEGATIVE MG/DL
LDLC SERPL CALC-MCNC: 64 MG/DL (ref ?–100)
LEUKOCYTE ESTERASE UR QL STRIP.AUTO: NEGATIVE
NITRITE UR QL STRIP.AUTO: NEGATIVE
NONHDLC SERPL-MCNC: 81 MG/DL (ref ?–130)
OSMOLALITY SERPL CALC.SUM OF ELEC: 273 MOSM/KG (ref 275–295)
PH UR STRIP.AUTO: 6 [PH] (ref 5–8)
POTASSIUM SERPL-SCNC: 4.2 MMOL/L (ref 3.5–5.1)
PROT SERPL-MCNC: 7.7 G/DL (ref 5.7–8.2)
PROT UR STRIP.AUTO-MCNC: NEGATIVE MG/DL
SODIUM SERPL-SCNC: 133 MMOL/L (ref 136–145)
SP GR UR STRIP.AUTO: 1.01 (ref 1–1.03)
TRIGL SERPL-MCNC: 94 MG/DL (ref 30–149)
TSI SER-ACNC: 1.94 UIU/ML (ref 0.55–4.78)
UROBILINOGEN UR STRIP.AUTO-MCNC: NORMAL MG/DL
VIT D+METAB SERPL-MCNC: 44.6 NG/ML (ref 30–100)
VLDLC SERPL CALC-MCNC: 14 MG/DL (ref 0–30)

## 2025-06-24 PROCEDURE — 84443 ASSAY THYROID STIM HORMONE: CPT | Performed by: FAMILY MEDICINE

## 2025-06-24 PROCEDURE — 88175 CYTOPATH C/V AUTO FLUID REDO: CPT | Performed by: FAMILY MEDICINE

## 2025-06-24 PROCEDURE — 82306 VITAMIN D 25 HYDROXY: CPT | Performed by: FAMILY MEDICINE

## 2025-06-24 PROCEDURE — 80053 COMPREHEN METABOLIC PANEL: CPT | Performed by: FAMILY MEDICINE

## 2025-06-24 PROCEDURE — 80061 LIPID PANEL: CPT | Performed by: FAMILY MEDICINE

## 2025-06-24 PROCEDURE — 83036 HEMOGLOBIN GLYCOSYLATED A1C: CPT | Performed by: FAMILY MEDICINE

## 2025-06-24 PROCEDURE — 99396 PREV VISIT EST AGE 40-64: CPT | Performed by: FAMILY MEDICINE

## 2025-06-24 PROCEDURE — 81001 URINALYSIS AUTO W/SCOPE: CPT | Performed by: FAMILY MEDICINE

## 2025-06-24 NOTE — H&P
CC: Annual Physical Exam    HPI:   Alexa Wolf is a 64 year old female who presents for a complete physical exam. Symptoms: is menopausal.     History of Present Illness  Alexa Wolf is a 64 year old female who presents with persistent dizziness and recent fall.    She has been experiencing persistent dizziness that began after a head injury. Initially, the dizziness improved but has recently worsened. A few weeks ago, she felt dizzy and unwell at a pool, prompting her to rest at home. The dizziness is not related to changes in position, and she denies vertigo. Previous evaluations did not identify a vestibular cause, and she has no history of seizures or heart issues despite prior testing during hospitalization.    Last Wednesday, she experienced a fall resulting in knee pain. Initially, the pain was severe and affected her sleep, but it has since improved. She reports stiffness when walking, although she can bend the knee. She has been using heat for relief, which she finds more effective than ice.    She has a history of vitamin D deficiency but no known issues with blood sugar. Her mother had diabetes, which led to pancreatic cancer. She has been drinking fluids regularly and does not believe dehydration is contributing to her dizziness.    She has undergone various tests, including blood work and imaging, and has a history of physical therapy for balance issues. She has not been taking any medication specifically for dizziness.        Wt Readings from Last 6 Encounters:   06/24/25 170 lb (77.1 kg)   01/03/25 168 lb (76.2 kg)   01/03/25 167 lb (75.8 kg)   12/16/24 167 lb 8 oz (76 kg)   11/20/24 164 lb (74.4 kg)   11/14/24 157 lb 13.6 oz (71.6 kg)     Body mass index is 27.44 kg/m².     Results for orders placed or performed in visit on 03/20/25   FACTOR II (PROTHROMBIN) DNA ANALYSIS [E]    Collection Time: 03/20/25 12:47 PM   Result Value Ref Range    FACT II (Prothrombin) Mutation  Normal Normal   FACTOR V LEIDEN MUTATION [E]    Collection Time: 03/20/25 12:47 PM   Result Value Ref Range    Factor V Leiden Mutation Normal Normal   CBC With Differential With Platelet    Collection Time: 03/20/25 12:47 PM   Result Value Ref Range    WBC 7.9 4.0 - 11.0 x10(3) uL    RBC 3.92 3.80 - 5.30 x10(6)uL    HGB 12.0 12.0 - 16.0 g/dL    HCT 36.4 35.0 - 48.0 %    .0 150.0 - 450.0 10(3)uL    MCV 92.9 80.0 - 100.0 fL    MCH 30.6 26.0 - 34.0 pg    MCHC 33.0 31.0 - 37.0 g/dL    RDW 12.8 %    Neutrophil Absolute Prelim 5.52 1.50 - 7.70 x10 (3) uL    Neutrophil Absolute 5.52 1.50 - 7.70 x10(3) uL    Lymphocyte Absolute 1.53 1.00 - 4.00 x10(3) uL    Monocyte Absolute 0.57 0.10 - 1.00 x10(3) uL    Eosinophil Absolute 0.18 0.00 - 0.70 x10(3) uL    Basophil Absolute 0.07 0.00 - 0.20 x10(3) uL    Immature Granulocyte Absolute 0.03 0.00 - 1.00 x10(3) uL    Neutrophil % 69.8 %    Lymphocyte % 19.4 %    Monocyte % 7.2 %    Eosinophil % 2.3 %    Basophil % 0.9 %    Immature Granulocyte % 0.4 %   Iron And Tibc    Collection Time: 03/20/25 12:47 PM   Result Value Ref Range    Iron 93 50 - 170 ug/dL    Transferrin 251 250 - 380 mg/dL    Total Iron Binding Capacity 323 250 - 425 ug/dL    % Saturation 29 15 - 50 %   Ferritin    Collection Time: 03/20/25 12:47 PM   Result Value Ref Range    Ferritin 45 (L) 50 - 306 ng/mL   Hepatitis B Surface Antibody (499)    Collection Time: 03/20/25 12:47 PM   Result Value Ref Range    Hepatitis B Surface Antibody Reactive Reactive     Heptatitis B Surface Ab Quant 255.964204 mIU/mL   Lupus Anticoagulant Comp    Collection Time: 03/20/25 12:47 PM   Result Value Ref Range    Pathologist Interpretation       Results indicate the presence of a lupus anticoagulant. These results confirm previous tests that were positive for lupus anticoagulant. This pattern of results is associated with an increased risk for development/recurrence of thromboembolic events. See separate results for  antiphospholipid antibodies and antibodies against phospholipid binding proteins.    Reviewed by Rhoda Velasco M.D. Pathology 03/21/25 at 12:51 PM    PT (Lupus) 13.2 11.6 - 14.8 seconds    PTT (Lupus) 44.0 (H) 23.0 - 36.0 seconds    Hexagonal Phase Staclot LA Positive (A) Negative    DRVVT Ratio 1.22 0.00 - 1.29    DRVVT Lupus Anticoagulant Negative Negative    INR (Lupus) 0.99 0.85 - 1.16    PTT (Hepzyme)      StaClot LA Delta 13.10 (H) <8.00 seconds   Beta 2 Glycoprotein I AB, G/M    Collection Time: 03/20/25 12:47 PM   Result Value Ref Range    Beta 2 Glycoprotein 1 Ab, IgG 1.3 <7 U/mL    Beta 2 Glycoprotein 1 Ab, IgM <2.4 <7 U/mL   Anticardiolipin AB, IgG/M, QN    Collection Time: 03/20/25 12:47 PM   Result Value Ref Range    Cardiolipin IgG Antibody 1.6 <10 GPL    Cardiolipin IgM Antibody 2.0 <10 MPL       Current Outpatient Medications   Medication Sig Dispense Refill    apixaban 5 MG Oral Tab take 1 tab (5mg) by mouth twice daily 180 tablet 0    LEVOTHYROXINE 25 MCG Oral Tab TAKE 1 TABLET BY MOUTH BEFORE BREAKFAST. 90 tablet 1    ustekinumab (STELARA) 90 MG/ML Subcutaneous Solution Prefilled Syringe injection Inject 1 mL (90 mg total) into the skin Every 8 (eight) weeks. 2 each 6    Butalbital-APAP-Caffeine -40 MG Oral Cap Take 1 capsule by mouth every 4 (four) hours as needed for Pain. 30 capsule 0    CALCIUM + D3 OR Take 1 tablet by mouth daily.      Cyanocobalamin (VITAMIN B 12) 500 MCG Oral Tab Take 1,000 mcg by mouth daily. 30 tablet 0    MULTIPLE VITAMIN OR Take 1 tablet by mouth daily.        Allergies   Allergen Reactions    Ampicillin RASH     \"CAPS\"    Bactrim     Sulfa Drugs Cross Reactors RASH and FEVER    Sulfamethoxazole      \"TMP DS\"      Past Medical History:    Acute pharyngitis    Acute sinusitis, unspecified    Allergy, unspecified not elsewhere classified    Anxiety    Chest pain, unspecified    Crohn's disease (HCC)    IBS (irritable bowel syndrome)    Obesity    Regional enteritis  of unspecified site    Crohn's Disease, Crohn's ileitis    Wears glasses      Past Surgical History:   Procedure Laterality Date    Colonoscopy  3/14/14    Repeat in 3 years. persistant ileitis.  consider increasing remicade dose to 7.5mg    Colonoscopy  2017    Colonoscopy with biopsy  2011    Other surgical history  2009    bxs-illeal and radom colon     Other surgical history      vein surgery    Upper gi endoscopy performed      duodenal diverticulum, mild gastritis      Family History   Problem Relation Age of Onset    Cancer Father 73        prostate    Prostate Cancer Father     Cancer Mother 75        pancreatic    Hypertension Mother     Diabetes Mother         My mother  from pancreatic cancer    Prostate Cancer Mother         Pancreatic cancer    Blood Clotting Disorder Mother     Other (stroke) Paternal Grandmother     Blood Disorder Other         blood clots, fam hx      Social History:   Social History     Socioeconomic History    Marital status:    Tobacco Use    Smoking status: Never     Passive exposure: Never    Smokeless tobacco: Never   Vaping Use    Vaping status: Never Used   Substance and Sexual Activity    Alcohol use: Yes     Comment: Wine on occasion    Drug use: No   Other Topics Concern    Caffeine Concern Yes     Comment: 2 lg a day    Exercise Yes     Comment: 3-5 x a week     Social Drivers of Health     Food Insecurity: No Food Insecurity (2024)    Food Insecurity     Food Insecurity: Never true   Transportation Needs: No Transportation Needs (2024)    Transportation Needs     Lack of Transportation: No   Housing Stability: Low Risk  (2024)    Housing Stability     Housing Instability: No     Occ: retired. : M. Children: 0.   Exercise: walking.  Diet: vegetarian; vegan     REVIEW OF SYSTEMS:   GENERAL: feels well otherwise  SKIN: denies any unusual skin lesions  EYES:denies blurred vision or double vision  HEENT: denies nasal  congestion, sinus pain or ST  LUNGS: denies shortness of breath with exertion  CARDIOVASCULAR: denies chest pain on exertion  GI: denies abdominal pain,denies heartburn  : denies dysuria, vaginal discharge or itching, in menopause   MUSCULOSKELETAL: denies back pain  NEURO: denies headaches  PSYCHE: denies depression or anxiety  HEMATOLOGIC: denies hx of anemia  ENDOCRINE: denies thyroid history  ALL/ASTHMA: denies hx of allergy or asthma    EXAM:   /78 (BP Location: Left arm, Patient Position: Sitting, Cuff Size: adult)   Pulse 65   Resp 18   Ht 5' 6\" (1.676 m)   Wt 170 lb (77.1 kg)   SpO2 97%   BMI 27.44 kg/m²   Body mass index is 27.44 kg/m².   GENERAL: well developed, well nourished,in no apparent distress  SKIN: no rashes,no suspicious lesions; SK right shoulder  HEENT: atraumatic, normocephalic,ears and throat clear  EYES: EOMI, conjunctiva are clear  NECK: supple,no adenopathy,no bruits, no thyromegaly  CHEST: no chest tenderness  BREAST: no dominant or suspicious mass, no nipple discharge  LUNGS: clear to auscultation  CARDIO: RRR without murmur  GI: good BS's,no masses, HSM or tenderness  :introitus is normal,scant discharge,cervix is pink,no adnexal masses or tenderness, PAP was done   MUSCULOSKELETAL: back is not tender,FROM of the back; right knee swelling and mild warmth  EXTREMITIES: no cyanosis, clubbing or edema  NEURO: Oriented times three,cranial nerves are intact,motor and sensory are grossly intact, 2 + knee reflexes bilaterally; mild diann hallpike left  VASCULAR: 2 + dorsalis pedal pulses bilaterally; severe varicose veins bilaterally    ASSESSMENT AND PLAN:   Alexa Wolf is a 64 year old female who presents for a complete physical exam.   Pap and pelvic done.   Mammo done 2025.  Self breast exam explained.   Health maintenance, will check:   Orders Placed This Encounter   Procedures    Comp Metabolic Panel (14)    TSH W Reflex To Free T4    Lipid Panel    Vitamin D,  25-Hydroxy    Hemoglobin A1C    Urinalysis with Culture Reflex       Sees Dr. GRANT for colonoscopy.  1/2024  Sees Dr. Cameron 2025 and 8/2025    Last eye exam -- 5/2025  Last dental exam -- 5/2025  Derm -- Dr. Burton -- 2/2025      Assessment & Plan  Dizziness  Persistent dizziness not linked to positional changes. Ruled out vertigo and vestibular issues. Meclizine discussed but declined.  - Consider over-the-counter Bonine for dizziness management.    Knee Injury  -- right  Knee pain post-fall with improvement. Tenderness and stiffness present. Heat use noted; ice recommended. X-ray ordered.  - Order knee x-ray.  - Recommend icing the knee.  - Order physical therapy if symptoms persist.    Vitamin D Deficiency  Vitamin D levels to be checked with routine labs.  - Check vitamin D levels as part of routine lab work.    General Health Maintenance  Routine health maintenance discussed. Immunizations up to date. Recent eye and dental exams completed.  - Conduct routine lab work including CBC, CMP, TSH, lipids, A1c, and urine analysis.  - Ensure immunizations are up to date.  - Confirm recent eye and dental exams.    Follow-up  Follow-up planned in three months for knee, physical therapy, and dizziness assessment.  - Schedule follow-up appointment in three months.          Pt' s weight is Body mass index is 27.44 kg/m²., recommended low fat diet and aerobic exercise 30 minutes three times weekly.    The patient indicates understanding of these issues and agrees to the plan.  The patient is asked to return in Return in about 3 months (around 9/24/2025) for 30 min -- vertigo and right knee pain.  .

## 2025-06-24 NOTE — PROGRESS NOTES
The following individual(s) verbally consented to be recorded using ambient AI listening technology and understand that they can each withdraw their consent to this listening technology at any point by asking the clinician to turn off or pause the recording:    Patient name: Alexa Isrrael Wolf  Additional names:  none    
(3) occasionally moist

## 2025-06-26 ENCOUNTER — HOSPITAL ENCOUNTER (OUTPATIENT)
Dept: GENERAL RADIOLOGY | Age: 65
Discharge: HOME | End: 2025-06-26
Attending: FAMILY MEDICINE
Payer: COMMERCIAL
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
## 2025-06-26 DIAGNOSIS — S89.91XA INJURY OF RIGHT KNEE, INITIAL ENCOUNTER: ICD-10-CM

## 2025-06-26 PROCEDURE — 73562 X-RAY EXAM OF KNEE 3: CPT | Performed by: FAMILY MEDICINE

## 2025-06-26 NOTE — PROGRESS NOTES
Alexa,    You have mild to moderate arthritis in your right knee.  Please consider physical therapy if you continue to have pain.    Sincerely,  Dr. Stephens

## 2025-07-15 ENCOUNTER — LAB ENCOUNTER (OUTPATIENT)
Dept: LAB | Age: 65
End: 2025-07-15
Attending: FAMILY MEDICINE
Payer: COMMERCIAL

## 2025-07-15 ENCOUNTER — RESULTS FOLLOW-UP (OUTPATIENT)
Dept: FAMILY MEDICINE CLINIC | Facility: CLINIC | Age: 65
End: 2025-07-15

## 2025-07-15 DIAGNOSIS — E87.1 HYPONATREMIA: Primary | ICD-10-CM

## 2025-07-15 DIAGNOSIS — E87.8 ELECTROLYTE IMBALANCE: ICD-10-CM

## 2025-07-15 DIAGNOSIS — D50.9 IRON DEFICIENCY ANEMIA, UNSPECIFIED IRON DEFICIENCY ANEMIA TYPE: ICD-10-CM

## 2025-07-15 LAB
ALBUMIN SERPL-MCNC: 4.6 G/DL (ref 3.2–4.8)
ALBUMIN/GLOB SERPL: 1.6 {RATIO} (ref 1–2)
ALP LIVER SERPL-CCNC: 68 U/L (ref 50–130)
ALT SERPL-CCNC: 19 U/L (ref 10–49)
ANION GAP SERPL CALC-SCNC: 5 MMOL/L (ref 0–18)
AST SERPL-CCNC: 26 U/L (ref ?–34)
BASOPHILS # BLD AUTO: 0.04 X10(3) UL (ref 0–0.2)
BASOPHILS NFR BLD AUTO: 0.6 %
BILIRUB SERPL-MCNC: 0.6 MG/DL (ref 0.2–1.1)
BUN BLD-MCNC: 9 MG/DL (ref 9–23)
CALCIUM BLD-MCNC: 9.3 MG/DL (ref 8.7–10.6)
CHLORIDE SERPL-SCNC: 98 MMOL/L (ref 98–112)
CO2 SERPL-SCNC: 29 MMOL/L (ref 21–32)
CREAT BLD-MCNC: 0.78 MG/DL (ref 0.55–1.02)
DEPRECATED HBV CORE AB SER IA-ACNC: 67 NG/ML (ref 50–306)
EGFRCR SERPLBLD CKD-EPI 2021: 85 ML/MIN/1.73M2 (ref 60–?)
EOSINOPHIL # BLD AUTO: 0.1 X10(3) UL (ref 0–0.7)
EOSINOPHIL NFR BLD AUTO: 1.4 %
ERYTHROCYTE [DISTWIDTH] IN BLOOD BY AUTOMATED COUNT: 12.6 %
FASTING STATUS PATIENT QL REPORTED: NO
GLOBULIN PLAS-MCNC: 2.8 G/DL (ref 2–3.5)
GLUCOSE BLD-MCNC: 80 MG/DL (ref 70–99)
HCT VFR BLD AUTO: 36 % (ref 35–48)
HGB BLD-MCNC: 11.9 G/DL (ref 12–16)
IMM GRANULOCYTES # BLD AUTO: 0.02 X10(3) UL (ref 0–1)
IMM GRANULOCYTES NFR BLD: 0.3 %
IRON SATN MFR SERPL: 32 % (ref 15–50)
IRON SERPL-MCNC: 102 UG/DL (ref 50–170)
LYMPHOCYTES # BLD AUTO: 1.7 X10(3) UL (ref 1–4)
LYMPHOCYTES NFR BLD AUTO: 23.6 %
MCH RBC QN AUTO: 30.9 PG (ref 26–34)
MCHC RBC AUTO-ENTMCNC: 33.1 G/DL (ref 31–37)
MCV RBC AUTO: 93.5 FL (ref 80–100)
MONOCYTES # BLD AUTO: 0.61 X10(3) UL (ref 0.1–1)
MONOCYTES NFR BLD AUTO: 8.5 %
NEUTROPHILS # BLD AUTO: 4.73 X10 (3) UL (ref 1.5–7.7)
NEUTROPHILS # BLD AUTO: 4.73 X10(3) UL (ref 1.5–7.7)
NEUTROPHILS NFR BLD AUTO: 65.6 %
OSMOLALITY SERPL CALC.SUM OF ELEC: 272 MOSM/KG (ref 275–295)
PLATELET # BLD AUTO: 226 10(3)UL (ref 150–450)
POTASSIUM SERPL-SCNC: 4.3 MMOL/L (ref 3.5–5.1)
PROT SERPL-MCNC: 7.4 G/DL (ref 5.7–8.2)
RBC # BLD AUTO: 3.85 X10(6)UL (ref 3.8–5.3)
SODIUM SERPL-SCNC: 132 MMOL/L (ref 136–145)
TOTAL IRON BINDING CAPACITY: 316 UG/DL (ref 250–425)
TRANSFERRIN SERPL-MCNC: 243 MG/DL (ref 250–380)
WBC # BLD AUTO: 7.2 X10(3) UL (ref 4–11)

## 2025-07-15 PROCEDURE — 83550 IRON BINDING TEST: CPT

## 2025-07-15 PROCEDURE — 82728 ASSAY OF FERRITIN: CPT

## 2025-07-15 PROCEDURE — 80053 COMPREHEN METABOLIC PANEL: CPT

## 2025-07-15 PROCEDURE — 36415 COLL VENOUS BLD VENIPUNCTURE: CPT

## 2025-07-15 PROCEDURE — 85025 COMPLETE CBC W/AUTO DIFF WBC: CPT

## 2025-07-15 PROCEDURE — 83540 ASSAY OF IRON: CPT

## 2025-07-24 ENCOUNTER — LAB ENCOUNTER (OUTPATIENT)
Dept: LAB | Age: 65
End: 2025-07-24
Attending: FAMILY MEDICINE
Payer: COMMERCIAL

## 2025-07-24 DIAGNOSIS — R79.89 ABNORMAL THYROID BLOOD TEST: ICD-10-CM

## 2025-07-24 DIAGNOSIS — E87.1 HYPONATREMIA: ICD-10-CM

## 2025-07-24 LAB
ALBUMIN SERPL-MCNC: 4.5 G/DL (ref 3.2–4.8)
ALBUMIN/GLOB SERPL: 1.6 {RATIO} (ref 1–2)
ALP LIVER SERPL-CCNC: 68 U/L (ref 50–130)
ALT SERPL-CCNC: 21 U/L (ref 10–49)
ANION GAP SERPL CALC-SCNC: 2 MMOL/L (ref 0–18)
AST SERPL-CCNC: 29 U/L (ref ?–34)
BILIRUB SERPL-MCNC: 0.5 MG/DL (ref 0.2–1.1)
BUN BLD-MCNC: 8 MG/DL (ref 9–23)
CALCIUM BLD-MCNC: 9.4 MG/DL (ref 8.7–10.6)
CHLORIDE SERPL-SCNC: 101 MMOL/L (ref 98–112)
CO2 SERPL-SCNC: 27 MMOL/L (ref 21–32)
CREAT BLD-MCNC: 0.91 MG/DL (ref 0.55–1.02)
EGFRCR SERPLBLD CKD-EPI 2021: 70 ML/MIN/1.73M2 (ref 60–?)
FASTING STATUS PATIENT QL REPORTED: NO
GLOBULIN PLAS-MCNC: 2.9 G/DL (ref 2–3.5)
GLUCOSE BLD-MCNC: 88 MG/DL (ref 70–99)
OSMOLALITY SERPL CALC.SUM OF ELEC: 268 MOSM/KG (ref 275–295)
POTASSIUM SERPL-SCNC: 4.4 MMOL/L (ref 3.5–5.1)
PROT SERPL-MCNC: 7.4 G/DL (ref 5.7–8.2)
SODIUM SERPL-SCNC: 130 MMOL/L (ref 136–145)

## 2025-07-24 PROCEDURE — 36415 COLL VENOUS BLD VENIPUNCTURE: CPT

## 2025-07-24 PROCEDURE — 80053 COMPREHEN METABOLIC PANEL: CPT

## 2025-07-24 RX ORDER — LEVOTHYROXINE SODIUM 25 UG/1
25 TABLET ORAL
Qty: 90 TABLET | Refills: 1 | Status: SHIPPED | OUTPATIENT
Start: 2025-07-24

## 2025-07-24 NOTE — TELEPHONE ENCOUNTER
Last office visit: 6/24/25   Protocol: pass  Requested medication(s) are due for refill today: yes  Requested medication(s) are on the active medication list same strength, form, dose/ sig: yes  Requested medication(s) are managed by provider: yes  Patient has already received a courtsey refill: no    NOV: 9/26/25  Last Labs: 7/15/25  Asked to Return: 9/24/25

## 2025-08-11 ENCOUNTER — LAB ENCOUNTER (OUTPATIENT)
Dept: LAB | Age: 65
End: 2025-08-11
Attending: FAMILY MEDICINE

## 2025-08-11 DIAGNOSIS — E87.1 HYPONATREMIA: ICD-10-CM

## 2025-08-11 LAB
SODIUM SERPL-SCNC: 34 MMOL/L/24HR (ref 40–220)
SPECIMEN VOL UR: 3400 ML

## 2025-08-11 PROCEDURE — 84300 ASSAY OF URINE SODIUM: CPT

## (undated) NOTE — LETTER
Date: 2018  Patient Name:  Karla De Los Santos             Address: [address] [place] [id number]    : 11/15/1960    Dear Karla De Los Santos,    I hope this letter finds you well.   The results of your labwork was overall norm

## (undated) NOTE — MR AVS SNAPSHOT
After Visit Summary   2/20/2017    Ricky Parent    MRN: [id number]           Diagnoses this Visit     Crohn's disease with complication, unspecified gastrointestinal tract location [facility])    -  Primary       Allergies     Ampicillin Rash Support Staff. Remember, MyChart is NOT to be used for urgent needs. For medical emergencies, dial 911.

## (undated) NOTE — MR AVS SNAPSHOT
After Visit Summary   4/17/2017    Ranjana Rodriguez    MRN: [id number]           Diagnoses this Visit     Crohn's disease with complication, unspecified gastrointestinal tract location [facility])    -  Primary       Allergies     Ampicillin Rash

## (undated) NOTE — MR AVS SNAPSHOT
After Visit Summary   6/7/2024    Alexa Wolf   MRN: [id number]           Visit Information     Date & Time  6/7/2024  1:00 PM Provider  Madai Stephens DO [facility] Dept. Phone  [phone]      Your Vitals Were  Most recent update: 6/7/2024 12:57 PM    BP   120/72 (BP Location: Left arm, Patient Position: Sitting, Cuff Size: adult)          Pulse   69          Resp   16          Ht   66\"          Wt   165 lb 6 oz             SpO2   100%    BMI   26.69 kg/m²         Allergies as of 6/7/2024  Review status set to Review Complete on 6/7/2024       Noted Reaction Type Reactions    Ampicillin 10/26/2012    RASH    \"CAPS\"    Bactrim 10/26/2012        Sulfa Drugs Cross Reactors 10/26/2012    RASH, FEVER    Sulfamethoxazole 10/26/2012        \"TMP DS\"      Your Current Medications        Dosage    levothyroxine 25 MCG Oral Tab Take 1 tablet (25 mcg total) by mouth before breakfast.    ustekinumab (STELARA) 90 MG/ML Subcutaneous Solution Prefilled Syringe injection MAINTENANCE: INJECT 1 SYRINGE SUBCUTANEOUSLY EVERY 8 WEEKS. REFRIGERATE. DO NOT FREEZE.    Cholecalciferol (VITAMIN D3) 75 MCG (3000 UT) Oral Tab Take 1 tablet by mouth.    Cyanocobalamin (VITAMIN B 12) 500 MCG Oral Tab Take 1,000 mcg by mouth daily.    MULTIPLE VITAMIN OR Take 1 tablet by mouth daily.      Diagnoses for This Visit    Routine general medical examination at a health care facility   [V70.0.ICD-9-CM]  -  Primary  Screening for genitourinary condition   [327686]    Laboratory examination ordered as part of a routine general medical examination   [V72.62.ICD-9-CM]    Screening for malignant neoplasm of cervix   [201789]    Acquired hypothyroidism   [356208]    Crohn's disease of both small and large intestine without complication   [333357]    Gastroesophageal reflux disease, unspecified whether esophagitis present   [2154058]    Pain due to varicose veins of both lower  extremities   [9264359]    Iron deficiency anemia, unspecified iron deficiency anemia type   [2256639]    Migraine without aura and without status migrainosus, not intractable   [712661]    Overweight (BMI 25.0-29.9)   [615583]    Medication management   [528949]    Vaccine counseling   [2787560]    Screening for osteoporosis   [830575]    Need for vaccination   [299895]    Encounter for screening mammogram for malignant neoplasm of breast   [786302]             Follow-up    Return in about 1 year (around 6/7/2025) for physical.     We Ordered the Following     Normal Orders This Visit    CBC W Differential W Platelet [E] [2005009 CUSTOM]     CBC W/ DIFFERENTIAL [74218887 CUSTOM]     Comp Metabolic Panel (14) [E] [2322000 CUSTOM]     Hep B, Adult [65043] [34451 CPT(R)]     Image-Guided Pap Smear ([facility]) [KJC3374 CPT(R)]     Lipid Panel [E] [8506337 CUSTOM]     T4 FREE [E] (Free Thyroxine) [8186050 CUSTOM]     ThinPrep PAP with HPV Reflex Request [[id number] CUSTOM]     TSH [E] [7009682 CUSTOM]     Urinalysis, Routine [E] [2749748 CUSTOM]     Future Labs/Procedures Expected by Expires    CBC W Differential W Platelet [E] [2005009 CUSTOM]  6/7/2024 6/6/2025    Comp Metabolic Panel (14) [E] [2992853 CUSTOM]  6/7/2024 6/6/2025    Lipid Panel [E] [5676333 CUSTOM]  6/7/2024 6/6/2025    T4 FREE [E] (Free Thyroxine) [7749681 CUSTOM]  6/7/2024 6/6/2025    ThinPrep PAP with HPV Reflex Request [[id number] CUSTOM]  6/7/2024 6/7/2025    TSH [E] [3341342 CUSTOM]  6/7/2024 6/6/2025    Urinalysis, Routine [E] [5468075 CUSTOM]  6/7/2024 6/6/2025    TIFFANY DHAVAL 2D+3D SCREENING BILAT (CPT=77067/75258) [COMBO CPT(R)]  12/17/2024 (Approximate) 6/7/2025    XR DEXA BONE DENSITOMETRY (CPT=77080) [98058 CPT(R)]  12/17/2024 (Approximate) 6/7/2025      Future Appointments        Provider Department    7/9/2024 3:00 PM EMG 11 NURSE [facility], [facility]    6/13/2025 1:00 PM Madai Stephens [facility],  [facility]      Follow-up Instructions    Return in about 1 year (around 6/7/2025) for physical.     Imaging Scheduling Instructions     Around December 17, 2024   Imaging:   TIFFANY DHAVAL 2D+3D SCREENING BILAT (CPT=77067/51552)    Instructions: Your order will generate a \"Scheduling Ticket\" that will be available in [employer] to schedule on your own at a time most convenient to you.      If you do not have a [employer] Account, or if you prefer to speak with someone to schedule your appointment, please call [employer] Scheduling at [phone].      Around December 17, 2024   Imaging:   XR DEXA BONE DENSITOMETRY (CPT=77080)    Instructions: Your order will generate a \"Scheduling Ticket\" that will be available in [employer] to schedule on your own at a time most convenient to you.      If you do not have a [employer] Account, or if you prefer to speak with someone to schedule your appointment, please call [employer] Scheduling at [phone].                    Did you know [place] [facility] [place] now offer Video Visits through [employer] for adult patients for a variety of conditions such as allergies, back pain and cold symptoms? Skip the drive and waiting room and online chat with a doctor face-to-face using your web-cam enabled computer or mobile device wherever you are. Video Visits cost $50 and can be paid hassle-free using a credit, debit, or health savings card.  Not active on [employer]? Ask us how to get signed up today!          If you receive a survey from [employer], please take a few minutes to complete it and provide feedback. We strive to deliver the best patient experience and are looking for ways to make improvements. Your feedback will help us do so. For more information on [email or web address], please visit [email or web address]           No text in SmartText           No text in SmartText

## (undated) NOTE — MR AVS SNAPSHOT
After Visit Summary   6/12/2017    Hermes Gates    MRN: [id number]           Diagnoses this Visit     Crohn's disease with complication, unspecified gastrointestinal tract location [facility])    -  Primary       Allergies     Ampicillin Rash Support Staff. Remember, MyChart is NOT to be used for urgent needs. For medical emergencies, dial 911.